# Patient Record
Sex: FEMALE | Race: WHITE | NOT HISPANIC OR LATINO | Employment: FULL TIME | URBAN - METROPOLITAN AREA
[De-identification: names, ages, dates, MRNs, and addresses within clinical notes are randomized per-mention and may not be internally consistent; named-entity substitution may affect disease eponyms.]

---

## 2022-07-24 ENCOUNTER — CLINICAL SUPPORT (OUTPATIENT)
Dept: CARDIOLOGY | Facility: HOSPITAL | Age: 57
DRG: 247 | End: 2022-07-24
Attending: INTERNAL MEDICINE

## 2022-07-24 ENCOUNTER — HOSPITAL ENCOUNTER (EMERGENCY)
Facility: HOSPITAL | Age: 57
Discharge: ANOTHER HOSPITAL | End: 2022-07-24
Attending: FAMILY MEDICINE

## 2022-07-24 ENCOUNTER — HOSPITAL ENCOUNTER (INPATIENT)
Facility: HOSPITAL | Age: 57
LOS: 1 days | Discharge: HOME | DRG: 247 | End: 2022-07-25
Attending: INTERNAL MEDICINE | Admitting: INTERNAL MEDICINE

## 2022-07-24 VITALS
HEART RATE: 95 BPM | TEMPERATURE: 98 F | OXYGEN SATURATION: 99 % | RESPIRATION RATE: 13 BRPM | WEIGHT: 120 LBS | SYSTOLIC BLOOD PRESSURE: 157 MMHG | HEIGHT: 60 IN | BODY MASS INDEX: 23.56 KG/M2 | DIASTOLIC BLOOD PRESSURE: 104 MMHG

## 2022-07-24 VITALS — WEIGHT: 126 LBS | HEIGHT: 60 IN | BODY MASS INDEX: 24.74 KG/M2

## 2022-07-24 DIAGNOSIS — I25.10 CAD (CORONARY ARTERY DISEASE): ICD-10-CM

## 2022-07-24 DIAGNOSIS — R07.9 CHEST PAIN: ICD-10-CM

## 2022-07-24 DIAGNOSIS — I21.3 STEMI (ST ELEVATION MYOCARDIAL INFARCTION): ICD-10-CM

## 2022-07-24 DIAGNOSIS — I21.29 ST ELEVATION MYOCARDIAL INFARCTION (STEMI) INVOLVING OTHER CORONARY ARTERY: Primary | ICD-10-CM

## 2022-07-24 PROBLEM — E11.65 TYPE 2 DIABETES MELLITUS WITH HYPERGLYCEMIA: Status: ACTIVE | Noted: 2022-07-24

## 2022-07-24 PROBLEM — Z72.0 TOBACCO USE: Status: ACTIVE | Noted: 2022-07-24

## 2022-07-24 LAB
ALBUMIN SERPL BCP-MCNC: 3.5 G/DL (ref 3.5–5.2)
ALP SERPL-CCNC: 144 U/L (ref 55–135)
ALT SERPL W/O P-5'-P-CCNC: 43 U/L (ref 10–44)
ANION GAP SERPL CALC-SCNC: 15 MMOL/L (ref 8–16)
AST SERPL-CCNC: 93 U/L (ref 10–40)
AV INDEX (PROSTH): 0.88
AV MEAN GRADIENT: 2 MMHG
AV VALVE AREA: 3.42 CM2
BASOPHILS # BLD AUTO: 0.09 K/UL (ref 0–0.2)
BASOPHILS NFR BLD: 1.1 % (ref 0–1.9)
BILIRUB SERPL-MCNC: 0.3 MG/DL (ref 0.1–1)
BSA FOR ECHO PROCEDURE: 1.56 M2
BUN SERPL-MCNC: 17 MG/DL (ref 6–20)
CALCIUM SERPL-MCNC: 9.5 MG/DL (ref 8.7–10.5)
CHLORIDE SERPL-SCNC: 96 MMOL/L (ref 95–110)
CHOLEST SERPL-MCNC: 229 MG/DL (ref 120–199)
CHOLEST/HDLC SERPL: 4.9 {RATIO} (ref 2–5)
CO2 SERPL-SCNC: 25 MMOL/L (ref 23–29)
CREAT SERPL-MCNC: 0.8 MG/DL (ref 0.5–1.4)
CV ECHO LV RWT: 0.44 CM
DIFFERENTIAL METHOD: ABNORMAL
DOP CALC AO VTI: 17.63 CM
DOP CALC LVOT AREA: 3.9 CM2
DOP CALC LVOT DIAMETER: 2.22 CM
DOP CALC LVOT PEAK VEL: 80.67 M/S
DOP CALC LVOT STROKE VOLUME: 60.24 CM3
DOP CALCLVOT PEAK VEL VTI: 15.57 CM
E WAVE DECELERATION TIME: 209.3 MSEC
E/A RATIO: 0.65
E/E' RATIO: 13.23 M/S
ECHO LV POSTERIOR WALL: 0.8 CM (ref 0.6–1.1)
EJECTION FRACTION: 55 %
EOSINOPHIL # BLD AUTO: 0.2 K/UL (ref 0–0.5)
EOSINOPHIL NFR BLD: 2.4 % (ref 0–8)
ERYTHROCYTE [DISTWIDTH] IN BLOOD BY AUTOMATED COUNT: 12.2 % (ref 11.5–14.5)
EST. GFR  (AFRICAN AMERICAN): >60 ML/MIN/1.73 M^2
EST. GFR  (NON AFRICAN AMERICAN): >60 ML/MIN/1.73 M^2
ESTIMATED AVG GLUCOSE: 372 MG/DL (ref 68–131)
FRACTIONAL SHORTENING: 27 % (ref 28–44)
GLUCOSE SERPL-MCNC: 269 MG/DL (ref 70–110)
GLUCOSE SERPL-MCNC: 277 MG/DL (ref 70–110)
GLUCOSE SERPL-MCNC: 293 MG/DL (ref 70–110)
GLUCOSE SERPL-MCNC: 399 MG/DL (ref 70–110)
HBA1C MFR BLD: 14.6 % (ref 4.5–6.2)
HCT VFR BLD AUTO: 44.3 % (ref 37–48.5)
HDLC SERPL-MCNC: 47 MG/DL (ref 40–75)
HDLC SERPL: 20.5 % (ref 20–50)
HGB BLD-MCNC: 15.2 G/DL (ref 12–16)
IMM GRANULOCYTES # BLD AUTO: 0.06 K/UL (ref 0–0.04)
IMM GRANULOCYTES NFR BLD AUTO: 0.7 % (ref 0–0.5)
INTERVENTRICULAR SEPTUM: 0.82 CM (ref 0.6–1.1)
IVRT: 111.88 MSEC
LDLC SERPL CALC-MCNC: 153.8 MG/DL (ref 63–159)
LEFT INTERNAL DIMENSION IN SYSTOLE: 2.66 CM (ref 2.1–4)
LEFT VENTRICLE DIASTOLIC VOLUME INDEX: 31.41 ML/M2
LEFT VENTRICLE DIASTOLIC VOLUME: 48.06 ML
LEFT VENTRICLE MASS INDEX: 53 G/M2
LEFT VENTRICLE SYSTOLIC VOLUME INDEX: 12.3 ML/M2
LEFT VENTRICLE SYSTOLIC VOLUME: 18.75 ML
LEFT VENTRICULAR INTERNAL DIMENSION IN DIASTOLE: 3.64 CM (ref 3.5–6)
LEFT VENTRICULAR MASS: 81.56 G
LV LATERAL E/E' RATIO: 12.29 M/S
LV SEPTAL E/E' RATIO: 14.33 M/S
LYMPHOCYTES # BLD AUTO: 2.7 K/UL (ref 1–4.8)
LYMPHOCYTES NFR BLD: 32.4 % (ref 18–48)
MCH RBC QN AUTO: 30.1 PG (ref 27–31)
MCHC RBC AUTO-ENTMCNC: 34.3 G/DL (ref 32–36)
MCV RBC AUTO: 88 FL (ref 82–98)
MONOCYTES # BLD AUTO: 0.8 K/UL (ref 0.3–1)
MONOCYTES NFR BLD: 9.8 % (ref 4–15)
MV PEAK A VEL: 1.33 M/S
MV PEAK E VEL: 0.86 M/S
NEUTROPHILS # BLD AUTO: 4.4 K/UL (ref 1.8–7.7)
NEUTROPHILS NFR BLD: 53.6 % (ref 38–73)
NONHDLC SERPL-MCNC: 182 MG/DL
NRBC BLD-RTO: 0 /100 WBC
PISA TR MAX VEL: 2.15 M/S
PLATELET # BLD AUTO: 313 K/UL (ref 150–450)
PMV BLD AUTO: 10.4 FL (ref 9.2–12.9)
POC ACTIVATED CLOTTING TIME K: 202 SEC (ref 74–137)
POC ACTIVATED CLOTTING TIME K: 225 SEC (ref 74–137)
POCT GLUCOSE: 369 MG/DL (ref 70–110)
POTASSIUM SERPL-SCNC: 3.9 MMOL/L (ref 3.5–5.1)
PROT SERPL-MCNC: 7.9 G/DL (ref 6–8.4)
PULM VEIN S/D RATIO: 0.85
PV PEAK D VEL: 51.24 M/S
PV PEAK S VEL: 43.41 M/S
RA PRESSURE: 3 MMHG
RBC # BLD AUTO: 5.05 M/UL (ref 4–5.4)
RIGHT VENTRICULAR END-DIASTOLIC DIMENSION: 2.41 CM
SAMPLE: ABNORMAL
SAMPLE: ABNORMAL
SODIUM SERPL-SCNC: 136 MMOL/L (ref 136–145)
TDI LATERAL: 0.07 M/S
TDI SEPTAL: 0.06 M/S
TDI: 0.07 M/S
TR MAX PG: 18 MMHG
TRIGL SERPL-MCNC: 141 MG/DL (ref 30–150)
TROPONIN I SERPL DL<=0.01 NG/ML-MCNC: 9.42 NG/ML (ref 0–0.03)
TSH SERPL DL<=0.005 MIU/L-ACNC: 1.89 UIU/ML (ref 0.34–5.6)
TV REST PULMONARY ARTERY PRESSURE: 21 MMHG
WBC # BLD AUTO: 8.27 K/UL (ref 3.9–12.7)

## 2022-07-24 PROCEDURE — 93454 CORONARY ARTERY ANGIO S&I: CPT | Mod: XU | Performed by: INTERNAL MEDICINE

## 2022-07-24 PROCEDURE — 63600175 PHARM REV CODE 636 W HCPCS: Performed by: INTERNAL MEDICINE

## 2022-07-24 PROCEDURE — C1894 INTRO/SHEATH, NON-LASER: HCPCS | Performed by: INTERNAL MEDICINE

## 2022-07-24 PROCEDURE — 93306 TTE W/DOPPLER COMPLETE: CPT

## 2022-07-24 PROCEDURE — 93010 ELECTROCARDIOGRAM REPORT: CPT | Mod: ,,, | Performed by: INTERNAL MEDICINE

## 2022-07-24 PROCEDURE — 99285 EMERGENCY DEPT VISIT HI MDM: CPT | Mod: 25

## 2022-07-24 PROCEDURE — 85025 COMPLETE CBC W/AUTO DIFF WBC: CPT | Performed by: FAMILY MEDICINE

## 2022-07-24 PROCEDURE — 20000000 HC ICU ROOM

## 2022-07-24 PROCEDURE — 93010 ELECTROCARDIOGRAM REPORT: CPT | Mod: 76,,, | Performed by: INTERNAL MEDICINE

## 2022-07-24 PROCEDURE — 80053 COMPREHEN METABOLIC PANEL: CPT | Performed by: FAMILY MEDICINE

## 2022-07-24 PROCEDURE — 84484 ASSAY OF TROPONIN QUANT: CPT | Performed by: FAMILY MEDICINE

## 2022-07-24 PROCEDURE — C1887 CATHETER, GUIDING: HCPCS | Performed by: INTERNAL MEDICINE

## 2022-07-24 PROCEDURE — 99900031 HC PATIENT EDUCATION (STAT)

## 2022-07-24 PROCEDURE — 83036 HEMOGLOBIN GLYCOSYLATED A1C: CPT | Performed by: INTERNAL MEDICINE

## 2022-07-24 PROCEDURE — C1725 CATH, TRANSLUMIN NON-LASER: HCPCS | Performed by: INTERNAL MEDICINE

## 2022-07-24 PROCEDURE — C9606 PERC D-E COR REVASC W AMI S: HCPCS | Mod: RI | Performed by: INTERNAL MEDICINE

## 2022-07-24 PROCEDURE — 25000003 PHARM REV CODE 250: Performed by: INTERNAL MEDICINE

## 2022-07-24 PROCEDURE — 93005 ELECTROCARDIOGRAM TRACING: CPT

## 2022-07-24 PROCEDURE — 84443 ASSAY THYROID STIM HORMONE: CPT | Performed by: INTERNAL MEDICINE

## 2022-07-24 PROCEDURE — 25000003 PHARM REV CODE 250

## 2022-07-24 PROCEDURE — 99153 MOD SED SAME PHYS/QHP EA: CPT | Performed by: INTERNAL MEDICINE

## 2022-07-24 PROCEDURE — 93010 EKG 12-LEAD: ICD-10-PCS | Mod: ,,, | Performed by: INTERNAL MEDICINE

## 2022-07-24 PROCEDURE — 80061 LIPID PANEL: CPT | Performed by: INTERNAL MEDICINE

## 2022-07-24 PROCEDURE — 25000003 PHARM REV CODE 250: Performed by: FAMILY MEDICINE

## 2022-07-24 PROCEDURE — 94761 N-INVAS EAR/PLS OXIMETRY MLT: CPT

## 2022-07-24 PROCEDURE — 63600175 PHARM REV CODE 636 W HCPCS: Performed by: FAMILY MEDICINE

## 2022-07-24 PROCEDURE — 93005 ELECTROCARDIOGRAM TRACING: CPT | Performed by: INTERNAL MEDICINE

## 2022-07-24 PROCEDURE — 82962 GLUCOSE BLOOD TEST: CPT

## 2022-07-24 PROCEDURE — 99152 MOD SED SAME PHYS/QHP 5/>YRS: CPT | Performed by: INTERNAL MEDICINE

## 2022-07-24 PROCEDURE — C1874 STENT, COATED/COV W/DEL SYS: HCPCS | Performed by: INTERNAL MEDICINE

## 2022-07-24 PROCEDURE — 93010 EKG 12-LEAD: ICD-10-PCS | Mod: 76,,, | Performed by: INTERNAL MEDICINE

## 2022-07-24 PROCEDURE — 36415 COLL VENOUS BLD VENIPUNCTURE: CPT | Performed by: INTERNAL MEDICINE

## 2022-07-24 PROCEDURE — C1769 GUIDE WIRE: HCPCS | Performed by: INTERNAL MEDICINE

## 2022-07-24 PROCEDURE — 96374 THER/PROPH/DIAG INJ IV PUSH: CPT

## 2022-07-24 PROCEDURE — C9600 PERC DRUG-EL COR STENT SING: HCPCS | Mod: LC | Performed by: INTERNAL MEDICINE

## 2022-07-24 DEVICE — STENT RONYX25018UX RESOLUTE ONYX 2.50X18
Type: IMPLANTABLE DEVICE | Site: CORONARY | Status: FUNCTIONAL
Brand: RESOLUTE ONYX™

## 2022-07-24 DEVICE — STENT RONYX22515UX RESOLUTE ONYX 2.25X15
Type: IMPLANTABLE DEVICE | Site: CORONARY | Status: FUNCTIONAL
Brand: RESOLUTE ONYX™

## 2022-07-24 RX ORDER — FENTANYL CITRATE 50 UG/ML
INJECTION, SOLUTION INTRAMUSCULAR; INTRAVENOUS
Status: DISCONTINUED | OUTPATIENT
Start: 2022-07-24 | End: 2022-07-24 | Stop reason: HOSPADM

## 2022-07-24 RX ORDER — LANOLIN ALCOHOL/MO/W.PET/CERES
800 CREAM (GRAM) TOPICAL
Status: DISCONTINUED | OUTPATIENT
Start: 2022-07-24 | End: 2022-07-25 | Stop reason: HOSPADM

## 2022-07-24 RX ORDER — CLOPIDOGREL BISULFATE 75 MG/1
75 TABLET ORAL DAILY
Status: DISCONTINUED | OUTPATIENT
Start: 2022-07-25 | End: 2022-07-25 | Stop reason: HOSPADM

## 2022-07-24 RX ORDER — ACETAMINOPHEN 325 MG/1
650 TABLET ORAL EVERY 8 HOURS PRN
Status: DISCONTINUED | OUTPATIENT
Start: 2022-07-24 | End: 2022-07-25 | Stop reason: HOSPADM

## 2022-07-24 RX ORDER — INSULIN ASPART 100 [IU]/ML
1-12 INJECTION, SOLUTION INTRAVENOUS; SUBCUTANEOUS
Status: DISCONTINUED | OUTPATIENT
Start: 2022-07-24 | End: 2022-07-25 | Stop reason: HOSPADM

## 2022-07-24 RX ORDER — ENOXAPARIN SODIUM 100 MG/ML
40 INJECTION SUBCUTANEOUS EVERY 24 HOURS
Status: DISCONTINUED | OUTPATIENT
Start: 2022-07-24 | End: 2022-07-25 | Stop reason: HOSPADM

## 2022-07-24 RX ORDER — ASPIRIN 81 MG/1
81 TABLET ORAL DAILY
Status: DISCONTINUED | OUTPATIENT
Start: 2022-07-24 | End: 2022-07-25 | Stop reason: HOSPADM

## 2022-07-24 RX ORDER — SODIUM,POTASSIUM PHOSPHATES 280-250MG
2 POWDER IN PACKET (EA) ORAL
Status: DISCONTINUED | OUTPATIENT
Start: 2022-07-24 | End: 2022-07-25 | Stop reason: HOSPADM

## 2022-07-24 RX ORDER — VERAPAMIL HYDROCHLORIDE 2.5 MG/ML
INJECTION, SOLUTION INTRAVENOUS
Status: DISCONTINUED | OUTPATIENT
Start: 2022-07-24 | End: 2022-07-24 | Stop reason: HOSPADM

## 2022-07-24 RX ORDER — METFORMIN HYDROCHLORIDE 1000 MG/1
1000 TABLET ORAL 2 TIMES DAILY WITH MEALS
COMMUNITY
End: 2023-11-30

## 2022-07-24 RX ORDER — HEPARIN SODIUM 5000 [USP'U]/ML
4000 INJECTION, SOLUTION INTRAVENOUS; SUBCUTANEOUS
Status: COMPLETED | OUTPATIENT
Start: 2022-07-24 | End: 2022-07-24

## 2022-07-24 RX ORDER — LIDOCAINE HYDROCHLORIDE 10 MG/ML
INJECTION, SOLUTION EPIDURAL; INFILTRATION; INTRACAUDAL; PERINEURAL
Status: DISCONTINUED | OUTPATIENT
Start: 2022-07-24 | End: 2022-07-24 | Stop reason: HOSPADM

## 2022-07-24 RX ORDER — CLOPIDOGREL BISULFATE 75 MG/1
TABLET ORAL
Status: DISCONTINUED | OUTPATIENT
Start: 2022-07-24 | End: 2022-07-24 | Stop reason: HOSPADM

## 2022-07-24 RX ORDER — ONDANSETRON 2 MG/ML
4 INJECTION INTRAMUSCULAR; INTRAVENOUS EVERY 8 HOURS PRN
Status: DISCONTINUED | OUTPATIENT
Start: 2022-07-24 | End: 2022-07-25 | Stop reason: HOSPADM

## 2022-07-24 RX ORDER — HEPARIN SODIUM 1000 [USP'U]/ML
INJECTION, SOLUTION INTRAVENOUS; SUBCUTANEOUS
Status: DISCONTINUED | OUTPATIENT
Start: 2022-07-24 | End: 2022-07-24 | Stop reason: HOSPADM

## 2022-07-24 RX ORDER — MIDAZOLAM HYDROCHLORIDE 1 MG/ML
INJECTION, SOLUTION INTRAMUSCULAR; INTRAVENOUS
Status: DISCONTINUED | OUTPATIENT
Start: 2022-07-24 | End: 2022-07-24 | Stop reason: HOSPADM

## 2022-07-24 RX ORDER — ASPIRIN 325 MG
325 TABLET ORAL
Status: COMPLETED | OUTPATIENT
Start: 2022-07-24 | End: 2022-07-24

## 2022-07-24 RX ORDER — POLYETHYLENE GLYCOL 3350 17 G/17G
17 POWDER, FOR SOLUTION ORAL DAILY PRN
Status: DISCONTINUED | OUTPATIENT
Start: 2022-07-24 | End: 2022-07-25 | Stop reason: HOSPADM

## 2022-07-24 RX ORDER — ATORVASTATIN CALCIUM 40 MG/1
40 TABLET, FILM COATED ORAL NIGHTLY
Status: DISCONTINUED | OUTPATIENT
Start: 2022-07-24 | End: 2022-07-25 | Stop reason: HOSPADM

## 2022-07-24 RX ORDER — NALOXONE HCL 0.4 MG/ML
0.02 VIAL (ML) INJECTION
Status: DISCONTINUED | OUTPATIENT
Start: 2022-07-24 | End: 2022-07-25 | Stop reason: HOSPADM

## 2022-07-24 RX ORDER — TALC
6 POWDER (GRAM) TOPICAL NIGHTLY PRN
Status: DISCONTINUED | OUTPATIENT
Start: 2022-07-24 | End: 2022-07-25 | Stop reason: HOSPADM

## 2022-07-24 RX ORDER — METOPROLOL TARTRATE 25 MG/1
12.5 TABLET ORAL 2 TIMES DAILY
Status: DISCONTINUED | OUTPATIENT
Start: 2022-07-24 | End: 2022-07-25 | Stop reason: HOSPADM

## 2022-07-24 RX ORDER — ASPIRIN 81 MG/1
81 TABLET ORAL DAILY
COMMUNITY

## 2022-07-24 RX ADMIN — INSULIN ASPART 6 UNITS: 100 INJECTION, SOLUTION INTRAVENOUS; SUBCUTANEOUS at 01:07

## 2022-07-24 RX ADMIN — METOPROLOL TARTRATE 12.5 MG: 25 TABLET, FILM COATED ORAL at 09:07

## 2022-07-24 RX ADMIN — HEPARIN SODIUM 4000 UNITS: 5000 INJECTION, SOLUTION INTRAVENOUS; SUBCUTANEOUS at 03:07

## 2022-07-24 RX ADMIN — ASPIRIN 325 MG ORAL TABLET 325 MG: 325 PILL ORAL at 03:07

## 2022-07-24 RX ADMIN — ATORVASTATIN CALCIUM 40 MG: 40 TABLET, FILM COATED ORAL at 09:07

## 2022-07-24 RX ADMIN — INSULIN ASPART 6 UNITS: 100 INJECTION, SOLUTION INTRAVENOUS; SUBCUTANEOUS at 05:07

## 2022-07-24 RX ADMIN — INSULIN ASPART 6 UNITS: 100 INJECTION, SOLUTION INTRAVENOUS; SUBCUTANEOUS at 09:07

## 2022-07-24 RX ADMIN — Medication 6 MG: at 10:07

## 2022-07-24 RX ADMIN — SODIUM CHLORIDE 500 ML: 0.9 INJECTION, SOLUTION INTRAVENOUS at 03:07

## 2022-07-24 RX ADMIN — ENOXAPARIN SODIUM 40 MG: 40 INJECTION SUBCUTANEOUS at 05:07

## 2022-07-24 NOTE — ED PROVIDER NOTES
Encounter Date: 7/24/2022       History   No chief complaint on file.    57-year-old female presents the ED complaining of dull substernal nonradiating chest pain onset was 1900 on 07/23, the patient has not had similar symptoms in the past she is a cigarette smoker and has a history of diabetes no known coronary artery disease        Review of patient's allergies indicates:   Allergen Reactions    Antihistamines - alkylamine     Sulfa (sulfonamide antibiotics)      Facial swelling     Past Medical History:   Diagnosis Date    Diabetes mellitus      No past surgical history on file.  No family history on file.  Social History     Tobacco Use    Smoking status: Current Every Day Smoker     Packs/day: 0.50    Smokeless tobacco: Never Used   Substance Use Topics    Alcohol use: Never    Drug use: Never     Review of Systems   Constitutional: Negative for fever.   HENT: Negative for sore throat.    Respiratory: Negative for shortness of breath.    Cardiovascular: Positive for chest pain. Negative for palpitations.   Gastrointestinal: Negative for nausea.   Genitourinary: Negative for dysuria.   Musculoskeletal: Negative for back pain.   Skin: Negative for rash.   Neurological: Negative for weakness.   Hematological: Does not bruise/bleed easily.       Physical Exam     Initial Vitals   BP Pulse Resp Temp SpO2   -- -- -- -- --      MAP       --         Physical Exam    Nursing note and vitals reviewed.  Constitutional: She appears well-developed and well-nourished. She is not diaphoretic. No distress.   HENT:   Head: Normocephalic and atraumatic.   Eyes: Pupils are equal, round, and reactive to light. Right eye exhibits no discharge. Left eye exhibits no discharge.   Neck: No tracheal deviation present. No JVD present.   Cardiovascular: Exam reveals no friction rub.    No murmur heard.  Pulmonary/Chest: No stridor. No respiratory distress. She has no wheezes. She has no rales.   Abdominal: Bowel sounds are normal.  She exhibits no distension.   Musculoskeletal:         General: Normal range of motion.     Neurological: She is alert.   Skin: Skin is warm.   Psychiatric: She has a normal mood and affect.     The evaluation, management and treatment of this patient involved Critical Care services  amounting to 45 minutes of direct involvement.  This time was exclusive of any billable procedures.  ED Course   Procedures  Labs Reviewed - No data to display  EKG Readings: (Independently Interpreted)   Initial Reading: STEMI. Previous EKG Date: none available. Rhythm: Normal Sinus Rhythm. Heart Rate: 99. Ectopy: No Ectopy. Conduction: Normal. ST Segment Elevation: AVL, I, V5 and V6. ST Segment Depression: III, V1 and V2.       Imaging Results    None          Medications - No data to display  Medical Decision Making:   ED Management:  Case discussed with regional referral center and cardiologist Dr. Johnston and ED physician Dr. Kofi Almonte at [facility] ED patient will be transferred promptly                      Clinical Impression:   Final diagnoses:  [I21.29] ST elevation myocardial infarction (STEMI) involving other coronary artery (Primary)                 Ash Pino MD  07/24/22 3917

## 2022-07-24 NOTE — Clinical Note
The catheter was inserted into the left coronary artery  ostium   right coronary artery. An angiography was performed of the right coronary arteries. Multiple views were taken. The angiography was performed via power injection. The injected amount was 6 mL contrast at 3 mL/s.

## 2022-07-24 NOTE — Clinical Note
The radial band was applied to the right radial artery. 16 cc's of air were inserted into the closure device.

## 2022-07-24 NOTE — PROGRESS NOTES
[facility] Medicine  Progress Note    Patient name: No Munoz  MRN: [id number]  Admit Date: 7/24/2022   LOS: 0 days     SUBJECTIVE:     Principal problem: STEMI (ST elevation myocardial infarction)    Interval History:  Patient denies chest pain a time of my evaluation.    Scheduled Meds:   aspirin  81 mg Oral Daily    atorvastatin  40 mg Oral QHS    [START ON 7/25/2022] clopidogreL  75 mg Oral Daily    enoxaparin  40 mg Subcutaneous Daily    metoprolol tartrate  12.5 mg Oral BID     Continuous Infusions:  PRN Meds:acetaminophen, dextrose 50%, dextrose 50%, insulin aspart U-100, magnesium oxide, magnesium oxide, melatonin, naloxone, ondansetron, polyethylene glycol, potassium bicarbonate, potassium bicarbonate, potassium bicarbonate, potassium, sodium phosphates, potassium, sodium phosphates, potassium, sodium phosphates    Review of patient's allergies indicates:   Allergen Reactions    Antihistamines - alkylamine     Sulfa (sulfonamide antibiotics)      Facial swelling       Review of Systems: As per interval history    OBJECTIVE:     Vital Signs (Most Recent)  Temp: 98.1 °F (36.7 °C) (07/24/22 1330)  Pulse: 94 (07/24/22 1330)  Resp: 15 (07/24/22 1330)  BP: 119/70 (07/24/22 1245)  SpO2: 98 % (07/24/22 1330)    Vital Signs Range (Last 24H):  Temp:  [98.1 °F (36.7 °C)-98.2 °F (36.8 °C)]   Pulse:  []   Resp:  [12-29]   BP: ()/()   SpO2:  [96 %-100 %]     I & O (Last 24H):    Intake/Output Summary (Last 24 hours) at 7/24/2022 1702  Last data filed at 7/24/2022 1301  Gross per 24 hour   Intake 400 ml   Output 500 ml   Net -100 ml       Physical Exam:  General: Patient resting comfortably in no acute distress. Appears stated age  Head: Normocephalic and atraumatic   Eyes:  No conjunctival pallor. No scleral icterus.  Mouth: Oral mucosa moist   Lungs: CTA. Good air entry.  Cor: Regular rate and rhythm. No murmurs. No pedal edema.  Abd: Soft.  Nontender  Musculoskeletal: No arthropathy, deformity.  Skin: No rashes, swelling, or erythema.  Neuro: Alert. Moving all extremities equally. Follows commands  Ext: No clubbing. No cyanosis. Peripheral pulses +  Psych: Normal mood and affect. Memory intact       Laboratory:  I have reviewed all pertinent lab results within the past 24 hours.  CBC:   Recent Labs   Lab 07/24/22 0342   WBC 8.27   RBC 5.05   HGB 15.2   HCT 44.3      MCV 88   MCH 30.1   MCHC 34.3     BMP:   Recent Labs   Lab 07/24/22 0342   *      K 3.9   CL 96   CO2 25   BUN 17   CREATININE 0.8   CALCIUM 9.5     Cardiac markers:   Recent Labs   Lab 07/24/22 0342   TROPONINI 9.421*     No results for input(s): COLORU, CLARITYU, SPECGRAV, PHUR, PROTEINUA, GLUCOSEU, BILIRUBINCON, BLOODU, WBCU, RBCU, BACTERIA, MUCUS, NITRITE, LEUKOCYTESUR, UROBILINOGEN, HYALINECASTS in the last 168 hours.    Diagnostic Results:  Labs: Reviewed  ECG: Reviewed  X-Ray: Reviewed    ASSESSMENT/PLAN:       Active Hospital Problems    Diagnosis  POA    *STEMI (ST elevation myocardial infarction) [I21.3]  Yes    Type 2 diabetes mellitus with hyperglycemia [E11.65]  Yes    Tobacco use [Z72.0]  Yes      Resolved Hospital Problems   No resolved problems to display.         Plan:  ST-elevation myocardial infarction with lateral injury pattern  Culprit vessel is likely the ramus intermedius.  Severe 99% lesion in the left circumflex.  Underwent intervention with stenting of both these vessels  Also noted to have nonobstructive CAD in the proximal to mid RCA and ostial to proximal LAD lesions  Routine post catheterization care  Dual antiplatelet therapy with aspirin and clopidogrel  Start atorvastatin 40 mg q.h.s.  Check lipid panel (high colesterol) and hemoglobin A1c (14.6)  Tobacco cessation  Cardiac rehab at discharge   metoprolol tartrate 12.5 mg bid  Echocardiogram  Moderate dose insulin sliding scale for type 2 DM.  Hold home metformin     Continue to  follow with Cardiology.  Input appreciated.            VTE Risk Mitigation (From admission, onward)         Ordered     enoxaparin injection 40 mg  Daily         07/24/22 0633     IP VTE HIGH RISK PATIENT  Once         07/24/22 0633     Place sequential compression device  Until discontinued         07/24/22 0633                    Department Hospital Medicine  [facility]  Dean Sears MD  Date of service: 07/24/2022

## 2022-07-24 NOTE — H&P
[facility] Medicine  History & Physical    Patient Name: No Munoz  MRN: [id number]  Patient Class: IP- Inpatient  Admission Date: 7/24/2022  Attending Physician: Srini Ndiaye MD  Primary Care Provider: Primary Doctor No         Patient information was obtained from patient, past medical records and ER records.     Subjective:     Principal Problem:STEMI (ST elevation myocardial infarction)    Chief Complaint:   Chief Complaint   Patient presents with    stemi        HPI: Patient is a 57-year-old  female with tobacco use and type 2 DM who presented to outside ED with left arm and chest discomfort as well as substernal dull nonradiating chest pain.      Symptoms started acutely last night.  Chest pain is substernal in location with radiation to the left arm. It is described as dull in nature.  No exacerbating or relieving factors.  Denies similar symptoms in the past.  EKG revealed ST elevation in lateral leads with reciprocal ST depression.  She was transferred to our facility for emergent cardiac catheterization.    Patient was seen in the ED.  Chest pain has subsided.  Describes ongoing left arm discomfort.  She was taken to the cath lab emergently.  Discussed plan with cardiologist.  Successful revascularization of ramus and left circumflex.          Past Medical History:   Diagnosis Date    Diabetes mellitus        No past surgical history on file.    Review of patient's allergies indicates:   Allergen Reactions    Antihistamines - alkylamine     Sulfa (sulfonamide antibiotics)      Facial swelling       Current Facility-Administered Medications on File Prior to Encounter   Medication    [COMPLETED] aspirin tablet 325 mg    [COMPLETED] heparin (porcine) injection 4,000 Units    [COMPLETED] sodium chloride 0.9% bolus 500 mL     Current Outpatient Medications on File Prior to Encounter   Medication Sig    aspirin (ECOTRIN) 81 MG EC tablet Take 81 mg by mouth  once daily.    metFORMIN (GLUCOPHAGE) 1000 MG tablet Take 1,000 mg by mouth 2 (two) times daily with meals.     Family History    None       Tobacco Use    Smoking status: Current Every Day Smoker     Packs/day: 0.50    Smokeless tobacco: Never Used   Substance and Sexual Activity    Alcohol use: Never    Drug use: Never    Sexual activity: Not on file     Review of Systems   Unable to perform ROS: Acuity of condition  STEMI  Objective:     Vital Signs (Most Recent):  Temp: 98.2 °F (36.8 °C) (07/24/22 0503)  Pulse: 100 (07/24/22 0503)  Resp: 20 (07/24/22 0503)  BP: (!) 145/81 (07/24/22 0503)  SpO2: 96 % (07/24/22 0503)   Vital Signs (24h Range):  Temp:  [98.1 °F (36.7 °C)-98.2 °F (36.8 °C)] 98.2 °F (36.8 °C)  Pulse:  [] 100  Resp:  [13-21] 20  SpO2:  [96 %-100 %] 96 %  BP: (145-161)/() 145/81        There is no height or weight on file to calculate BMI.    Physical Exam      General: Patient resting comfortably in no acute distress. Appears stated age  Head: Normocephalic and atraumatic   Eyes:  No conjunctival pallor. No scleral icterus.  Mouth: Oral mucosa moist   Lungs: CTA. Good air entry.  Cor: Regular rate and rhythm. No murmurs. No pedal edema.  Abd: Soft. Nontender  Musculoskeletal: No arthropathy, deformity.  Skin: No rashes, swelling, or erythema.  Neuro: Alert. Moving all extremities equally. Follows commands  Ext: No clubbing. No cyanosis. Peripheral pulses +  Psych: Normal mood and affect. Memory intact       Significant Labs: All pertinent labs within the past 24 hours have been reviewed.  CBC:   Recent Labs   Lab 07/24/22  0342   WBC 8.27   HGB 15.2   HCT 44.3        CMP:   Recent Labs   Lab 07/24/22  0342      K 3.9   CL 96   CO2 25   *   BUN 17   CREATININE 0.8   CALCIUM 9.5   PROT 7.9   ALBUMIN 3.5   BILITOT 0.3   ALKPHOS 144*   AST 93*   ALT 43   ANIONGAP 15   EGFRNONAA >60.0     Troponin:   Recent Labs   Lab 07/24/22  0342   TROPONINI 9.421*       Significant  Imaging: I have reviewed all pertinent imaging results/findings within the past 24 hours.    Imaging Results              X-Ray Chest AP Portable (Final result)  Result time 07/24/22 06:32:48      Final result by Stalin Townsend MD (07/24/22 06:32:48)                   Narrative:    Chest single view    Clinical data:Chest pain.    FINDINGS: AP view of the chest demonstrates no cardiac, pulmonary, or osseous abnormalities.    IMPRESSION:  1. Normal chest single view.    Electronically signed by:  Stalin Townsend MD  7/24/2022 6:32 AM CDT Workstation: [id number]                                  Results for orders placed or performed during the hospital encounter of 07/24/22   EKG 12-lead    Collection Time: 07/24/22  4:58 AM    Narrative    Test Reason : I21.3,    Vent. Rate : 097 BPM     Atrial Rate : 097 BPM     P-R Int : 164 ms          QRS Dur : 076 ms      QT Int : 366 ms       P-R-T Axes : 069 080 083 degrees     QTc Int : 464 ms    Normal sinus rhythm  Nonspecific ST and T wave abnormality  Abnormal ECG  When compared with ECG of 24-JUL-2022 03:24,  ST no longer elevated in Lateral leads    Referred By: CAMELIA BURNETT           Confirmed By:          Assessment/Plan:     Active Hospital Problems    Diagnosis  POA    *STEMI (ST elevation myocardial infarction) [I21.3]  Yes    Type 2 diabetes mellitus with hyperglycemia [E11.65]  Yes    Tobacco use [Z72.0]  Yes      Resolved Hospital Problems   No resolved problems to display.       Plan:  ST-elevation myocardial infarction with lateral injury pattern  Culprit vessel is likely the ramus intermedius.  Severe 99% lesion in the left circumflex.  Underwent intervention with stenting of both these vessels  Also noted to have nonobstructive CAD in the proximal to mid RCA and ostial to proximal LAD lesions  Routine post catheterization care  Dual antiplatelet therapy with aspirin and clopidogrel  Start atorvastatin 40 mg q.h.s.  Check lipid panel and  hemoglobin A1c  Tobacco cessation  Cardiac rehab at discharge   Start metoprolol tartrate 12.5 mg tonight  Echocardiogram  Moderate dose insulin sliding scale for type 2 DM.  Hold home metformin    Discussed plan with Cardiology  Critical care time of 40 minutes spent      VTE Risk Mitigation (From admission, onward)         Ordered     enoxaparin injection 40 mg  Daily         07/24/22 0633     IP VTE HIGH RISK PATIENT  Once         07/24/22 0633     Place sequential compression device  Until discontinued         07/24/22 0633     heparin (porcine) injection  As needed (PRN)         07/24/22 0538                   Srini Ndiaye MD  Department of Hospital Medicine   Formerly [facility], [facility]

## 2022-07-24 NOTE — SUBJECTIVE & OBJECTIVE
Past Medical History:   Diagnosis Date    Diabetes mellitus        No past surgical history on file.    Review of patient's allergies indicates:   Allergen Reactions    Antihistamines - alkylamine     Sulfa (sulfonamide antibiotics)      Facial swelling       Current Facility-Administered Medications on File Prior to Encounter   Medication    [COMPLETED] aspirin tablet 325 mg    [COMPLETED] heparin (porcine) injection 4,000 Units    [COMPLETED] sodium chloride 0.9% bolus 500 mL     Current Outpatient Medications on File Prior to Encounter   Medication Sig    aspirin (ECOTRIN) 81 MG EC tablet Take 81 mg by mouth once daily.    metFORMIN (GLUCOPHAGE) 1000 MG tablet Take 1,000 mg by mouth 2 (two) times daily with meals.     Family History    None       Tobacco Use    Smoking status: Current Every Day Smoker     Packs/day: 0.50    Smokeless tobacco: Never Used   Substance and Sexual Activity    Alcohol use: Never    Drug use: Never    Sexual activity: Not on file     Review of Systems   Unable to perform ROS: Acuity of condition  STEMI  Objective:     Vital Signs (Most Recent):  Temp: 98.2 °F (36.8 °C) (07/24/22 0503)  Pulse: 100 (07/24/22 0503)  Resp: 20 (07/24/22 0503)  BP: (!) 145/81 (07/24/22 0503)  SpO2: 96 % (07/24/22 0503)   Vital Signs (24h Range):  Temp:  [98.1 °F (36.7 °C)-98.2 °F (36.8 °C)] 98.2 °F (36.8 °C)  Pulse:  [] 100  Resp:  [13-21] 20  SpO2:  [96 %-100 %] 96 %  BP: (145-161)/() 145/81        There is no height or weight on file to calculate BMI.    Physical Exam      General: Patient resting comfortably in no acute distress. Appears stated age  Head: Normocephalic and atraumatic   Eyes:  No conjunctival pallor. No scleral icterus.  Mouth: Oral mucosa moist   Lungs: CTA. Good air entry.  Cor: Regular rate and rhythm. No murmurs. No pedal edema.  Abd: Soft. Nontender  Musculoskeletal: No arthropathy, deformity.  Skin: No rashes, swelling, or erythema.  Neuro: Alert. Moving all extremities  equally. Follows commands  Ext: No clubbing. No cyanosis. Peripheral pulses +  Psych: Normal mood and affect. Memory intact       Significant Labs: All pertinent labs within the past 24 hours have been reviewed.  CBC:   Recent Labs   Lab 07/24/22 0342   WBC 8.27   HGB 15.2   HCT 44.3        CMP:   Recent Labs   Lab 07/24/22 0342      K 3.9   CL 96   CO2 25   *   BUN 17   CREATININE 0.8   CALCIUM 9.5   PROT 7.9   ALBUMIN 3.5   BILITOT 0.3   ALKPHOS 144*   AST 93*   ALT 43   ANIONGAP 15   EGFRNONAA >60.0     Troponin:   Recent Labs   Lab 07/24/22 0342   TROPONINI 9.421*       Significant Imaging: I have reviewed all pertinent imaging results/findings within the past 24 hours.    Imaging Results              X-Ray Chest AP Portable (Final result)  Result time 07/24/22 06:32:48      Final result by Stalin Townsend MD (07/24/22 06:32:48)                   Narrative:    Chest single view    Clinical data:Chest pain.    FINDINGS: AP view of the chest demonstrates no cardiac, pulmonary, or osseous abnormalities.    IMPRESSION:  1. Normal chest single view.    Electronically signed by:  Stalin Townsend MD  7/24/2022 6:32 AM CDT Workstation: [id number]                                  Results for orders placed or performed during the hospital encounter of 07/24/22   EKG 12-lead    Collection Time: 07/24/22  4:58 AM    Narrative    Test Reason : I21.3,    Vent. Rate : 097 BPM     Atrial Rate : 097 BPM     P-R Int : 164 ms          QRS Dur : 076 ms      QT Int : 366 ms       P-R-T Axes : 069 080 083 degrees     QTc Int : 464 ms    Normal sinus rhythm  Nonspecific ST and T wave abnormality  Abnormal ECG  When compared with ECG of 24-JUL-2022 03:24,  ST no longer elevated in Lateral leads    Referred By: CAMELIA BURNETT           Confirmed By:

## 2022-07-24 NOTE — Clinical Note
The groin and right radial was prepped. The site was prepped with ChloraPrep. The site was clipped. The patient was draped. The patient was positioned supine. The patient was secured using safety straps and to an armboard.

## 2022-07-24 NOTE — Clinical Note
The catheter was repositioned into the ostial  left coronary artery. An angiography was performed of the left coronary arteries. Multiple views were taken. The angiography was performed via power injection. The injected amount was 8 mL contrast at 4 mL/s.

## 2022-07-24 NOTE — HPI
Patient is a 57-year-old  female with tobacco use and type 2 DM who presented to outside ED with left arm and chest discomfort as well as substernal dull nonradiating chest pain.      Symptoms started acutely last night.  Chest pain is substernal in location with radiation to the left arm. It is described as dull in nature.  No exacerbating or relieving factors.  Denies similar symptoms in the past.  EKG revealed ST elevation in lateral leads with reciprocal ST depression.  She was transferred to our facility for emergent cardiac catheterization.    Patient was seen in the ED.  Chest pain has subsided.  Describes ongoing left arm discomfort.  She was taken to the cath lab emergently.  Discussed plan with cardiologist.  Successful revascularization of ramus and left circumflex.

## 2022-07-24 NOTE — CONSULTS
Critical access hospital  Department of Cardiology  Consult Note      PATIENT NAME: No Munoz  MRN: [id number]  TODAY'S DATE: 07/24/2022  ADMIT DATE: 7/24/2022                          CONSULT REQUESTED BY: Dean Sears MD      REASON FOR CONSULT:  STEMI (ST elevation myocardial infarction)      HPI:  No Munoz is a 57 y.o. who is admitted due to STEMI (ST elevation myocardial infarction).   H/o smoking and T2DM (uncontrolled) that presented last night to [facility] after waking up with chest tightness- left arm pain. EKG SR, lateral STEMI with T wave inversions in septal leads, Found RI heazy lesion and LCX 95% stenosis , both stented. Additional CAD in the proximal LAD and RCA.        Review of patient's allergies indicates:   Allergen Reactions    Antihistamines - alkylamine     Sulfa (sulfonamide antibiotics)      Facial swelling       Past Medical History:   Diagnosis Date    Diabetes mellitus      History reviewed. No pertinent surgical history.  Social History     Tobacco Use    Smoking status: Current Every Day Smoker     Packs/day: 1.00    Smokeless tobacco: Never Used   Substance Use Topics    Alcohol use: Never    Drug use: Never        REVIEW OF SYSTEMS  CONSTITUTIONAL: Negative for chills, fatigue and fever.   EYES: No double vision, No blurred vision  NEURO: No headaches, No dizziness  RESPIRATORY: Negative for cough, shortness of breath and wheezing.    CARDIOVASCULAR: + chest pain and arm pain   GI: Negative for abdominal pain, No melena, diarrhea, nausea and vomiting.   : Negative for dysuria and frequency, Negative for hematuria  SKIN: Negative for bruising, Negative for edema or discoloration noted.   ENDOCRINE: Negative for polyphagia, Negative for heat intolerance, Negative for cold intolerance  PSYCHIATRIC: Negative for depression, Negative for anxiety, Negative for memory loss  MUSCULOSKELETAL: Negative for neck pain, Negative for muscle weakness, Negative  for back pain     PHYSICAL EXAM      VITAL SIGNS (Most Recent)  Temp: 98.1 °F (36.7 °C) (07/24/22 0725)  Pulse: 87 (07/24/22 0800)  Resp: 19 (07/24/22 0800)  BP: 105/61 (07/24/22 0800)  SpO2: 97 % (07/24/22 0800)    VENTILATION STATUS  Resp: 19 (07/24/22 0800)  SpO2: 97 % (07/24/22 0800)       I & O (Last 24H):No intake or output data in the 24 hours ending 07/24/22 0919    WEIGHTS  Wt Readings from Last 3 Encounters:   07/24/22 0852 57.5 kg (126 lb 12.2 oz)   07/24/22 0324 54.4 kg (120 lb)         GENERAL: well built, well nourished, well-developed in no apparent distress alert and oriented.   HEENT: Thais bleeding    NECK: No JVD. No bruit..   THYROID: Thyroid not enlarged. No nodules present..   CARDIAC: Regular rate and rhythm. S1 is normal.S2 is normal.No gallops, clicks or murmurs noted at this time.  CHEST ANATOMY: normal.   LUNGS: Clear to auscultation. No wheezing or rhonchi..   ABDOMEN: Soft no masses or organomegaly.  No abdomen pulsations or bruits.  Normal bowel sounds. No pulsations and no masses felt, No guarding or rebound.   URINARY: No leung catheter   EXTREMITIES: No cyanosis, clubbing or edema noted at this time., no calf tenderness bilaterally.   PERIPHERAL VASCULAR SYSTEM: Good palpable distal pulses.   CENTRAL NERVOUS SYSTEM: No focal motor or sensory deficits noted.   SKIN: Skin without lesions, moist, well perfused.   MUSCLE STRENGTH & TONE: No noteable weakness, atrophy or abnormal movement.     HOME MEDICATIONS:  Current Facility-Administered Medications on File Prior to Encounter   Medication Dose Route Frequency Provider Last Rate Last Admin    [COMPLETED] aspirin tablet 325 mg  325 mg Oral ED 1 Time Ash Pino MD   325 mg at 07/24/22 0336    [COMPLETED] heparin (porcine) injection 4,000 Units  4,000 Units Intravenous ED 1 Time Ash Pino MD   4,000 Units at 07/24/22 4114    [COMPLETED] sodium chloride 0.9% bolus 500 mL  500 mL Intravenous ED 1 Time Ash KIMBROUGH  MD Odette 500 mL/hr at 07/24/22 0342 500 mL at 07/24/22 0342     Current Outpatient Medications on File Prior to Encounter   Medication Sig Dispense Refill    aspirin (ECOTRIN) 81 MG EC tablet Take 81 mg by mouth once daily.      metFORMIN (GLUCOPHAGE) 1000 MG tablet Take 1,000 mg by mouth 2 (two) times daily with meals.         SCHEDULED MEDS:   aspirin  81 mg Oral Daily    atorvastatin  40 mg Oral QHS    [START ON 7/25/2022] clopidogreL  75 mg Oral Daily    enoxaparin  40 mg Subcutaneous Daily    metoprolol tartrate  12.5 mg Oral BID       CONTINUOUS INFUSIONS:    PRN MEDS:acetaminophen, dextrose 50%, dextrose 50%, insulin aspart U-100, magnesium oxide, magnesium oxide, melatonin, naloxone, ondansetron, polyethylene glycol, potassium bicarbonate, potassium bicarbonate, potassium bicarbonate, potassium, sodium phosphates, potassium, sodium phosphates, potassium, sodium phosphates    LABS AND DIAGNOSTICS     CBC LAST 3 DAYS  Recent Labs   Lab 07/24/22 0342   WBC 8.27   RBC 5.05   HGB 15.2   HCT 44.3   MCV 88   MCH 30.1   MCHC 34.3   RDW 12.2      MPV 10.4   GRAN 53.6  4.4   LYMPH 32.4  2.7   MONO 9.8  0.8   BASO 0.09   NRBC 0       COAGULATION LAST 3 DAYS  No results for input(s): LABPT, INR, APTT in the last 168 hours.    CHEMISTRY LAST 3 DAYS  Recent Labs   Lab 07/24/22 0342      K 3.9   CL 96   CO2 25   ANIONGAP 15   BUN 17   CREATININE 0.8   *   CALCIUM 9.5   ALBUMIN 3.5   PROT 7.9   ALKPHOS 144*   ALT 43   AST 93*   BILITOT 0.3       CARDIAC PROFILE LAST 3 DAYS  Recent Labs   Lab 07/24/22 0342   TROPONINI 9.421*       ENDOCRINE LAST 3 DAYS  No results for input(s): TSH, PROCAL in the last 168 hours.    LAST ARTERIAL BLOOD GAS  ABG  No results for input(s): PH, PO2, PCO2, HCO3, BE in the last 168 hours.    LAST 7 DAYS MICROBIOLOGY   Microbiology Results (last 7 days)     ** No results found for the last 168 hours. **          MOST RECENT IMAGING  Cardiac catheterization  ·  Culprit lesion is 99% reamus intermedius s/p succesful intervention PTCA   2.0 mm balloon and stent 2.25 x 15 mm  · A second severe lesion in the Prox Cx to Mid Cx lesion was 95% stenosed   with 0% stenosis post-intervention and 2.5 x 18 mm YEVGENIY was placed.  · There was two significant vessel coronary artery disease and   non-obstructuve CAD in other arteries.  · The Prox RCA to Mid RCA lesion was 50% stenosed.  · The Ost LAD to Prox LAD lesion was 50% stenosed.  · The estimated blood loss was none.     The procedure log was documented by Documenter: RT Mireille and   verified by Rohith Guajardo MD.    Date: 7/24/2022  Time: 6:46 AM  X-Ray Chest AP Portable  Chest single view    Clinical data:Chest pain.    FINDINGS: AP view of the chest demonstrates no cardiac, pulmonary, or osseous abnormalities.    IMPRESSION:  1. Normal chest single view.    Electronically signed by:  Stalin Townsend MD  7/24/2022 6:32 AM PagerDutyT Workstation: [id number]      ECHOCARDIOGRAM RESULTS (last 5)  No results found for this or any previous visit.      CARDIAC CATH (last)  No results found for this or any previous visit.      CURRENT/PREVIOUS VISIT EKG  Results for orders placed or performed during the hospital encounter of 07/24/22   EKG 12-LEAD on arrival to floor    Collection Time: 07/24/22  6:52 AM    Narrative    Test Reason : I25.10,    Vent. Rate : 087 BPM     Atrial Rate : 087 BPM     P-R Int : 166 ms          QRS Dur : 084 ms      QT Int : 402 ms       P-R-T Axes : 072 085 093 degrees     QTc Int : 483 ms    Normal sinus rhythm  Prolonged QT  Abnormal ECG  When compared with ECG of 24-JUL-2022 04:58,  No significant change was found    Referred By: CAMELIA BURNETT           Confirmed By:          ASSESSMENT/PLAN:     Active Hospital Problems    Diagnosis    *STEMI (ST elevation myocardial infarction)    Type 2 diabetes mellitus with hyperglycemia    Tobacco use       ASSESSMENT & PLAN:   STEMI -RI    T2DM  Smoker    RECOMMENDATIONS:  DAPT, smoking cessation   Statins   Echo         Rohith Guajardo MD  [facility]  Department of Cardiology  Date of Service: 07/24/2022  9:19 AM

## 2022-07-24 NOTE — PLAN OF CARE
[facility]charge Assessment       Primary Care Provider: Primary Doctor No    Admission Diagnosis: ST elevation myocardial infarction (STEMI) involving other coronary artery [I21.29]    Admission Date: 7/24/2022  Expected Discharge Date:     Discharge Barriers Identified: (P) Unisured    Assessment completed at bedside with Pt and her sister, Andree Munoz. Pt lives with her sister and her step dad and she plans to return home at discharge. Pt does not use any DME at home and reports being independent in her ADLs. Pt states her son, Octaviano Canseco, [phone], is her POA but he lives in [place]. Pt asked for resources regarding medicaid eligibility, SW provided.     Payor: /     Extended Emergency Contact Information  Primary Emergency Contact: Andree Munoz  Mobile Phone: [phone]  Relation: Sister  Preferred language: English   needed? No    Discharge Plan A: (P) Home with family  Discharge Plan B: (P) Home with family    No Pharmacies Listed    Initial Assessment (most recent)       Adult Discharge Assessment - 07/24/22 1400          Discharge Assessment    Assessment Type Discharge Planning Assessment (P)      Confirmed/corrected address, phone number and insurance Yes (P)      Confirmed Demographics Correct on Facesheet (P)      Source of Information patient (P)      Communicated JAKUB with patient/caregiver Date not available/Unable to determine (P)      Reason For Admission STEMI, chest pains (P)      Lives With parent(s);sibling(s) (P)      Facility Arrived From: home (P)      Do you expect to return to your current living situation? Yes (P)      Do you have help at home or someone to help you manage your care at home? Yes (P)      Who are your caregiver(s) and their phone number(s)? Andree Munoz, [phone], sister (P)      Prior to hospitilization cognitive status: Alert/Oriented (P)      Current cognitive status: Alert/Oriented (P)      Walking  or Climbing Stairs Difficulty none (P)      Dressing/Bathing Difficulty none (P)      Equipment Currently Used at Home none (P)      Readmission within 30 days? No (P)      Patient currently being followed by outpatient case management? No (P)      Do you currently have service(s) that help you manage your care at home? No (P)      Do you take prescription medications? No (P)      Do you have prescription coverage? No (P)      Who is going to help you get home at discharge? Andree Munoz, sister (P)      How do you get to doctors appointments? car, drives self (P)      Are you on dialysis? No (P)      Do you take coumadin? No (P)      Discharge Plan A Home with family (P)      Discharge Plan B Home with family (P)      DME Needed Upon Discharge  none (P)      Discharge Plan discussed with: Patient;Sibling (P)      Name(s) and Number(s) Andree Munoz, sister (P)      Discharge Barriers Identified Unisured (P)

## 2022-07-25 VITALS
OXYGEN SATURATION: 98 % | TEMPERATURE: 98 F | BODY MASS INDEX: 24.88 KG/M2 | HEIGHT: 60 IN | SYSTOLIC BLOOD PRESSURE: 113 MMHG | RESPIRATION RATE: 17 BRPM | HEART RATE: 106 BPM | DIASTOLIC BLOOD PRESSURE: 66 MMHG | WEIGHT: 126.75 LBS

## 2022-07-25 LAB
ANION GAP SERPL CALC-SCNC: 4 MMOL/L (ref 8–16)
BUN SERPL-MCNC: 11 MG/DL (ref 6–20)
CALCIUM SERPL-MCNC: 8.1 MG/DL (ref 8.7–10.5)
CHLORIDE SERPL-SCNC: 101 MMOL/L (ref 95–110)
CO2 SERPL-SCNC: 25 MMOL/L (ref 23–29)
CREAT SERPL-MCNC: 0.4 MG/DL (ref 0.5–1.4)
ERYTHROCYTE [DISTWIDTH] IN BLOOD BY AUTOMATED COUNT: 12.4 % (ref 11.5–14.5)
EST. GFR  (AFRICAN AMERICAN): >60 ML/MIN/1.73 M^2
EST. GFR  (NON AFRICAN AMERICAN): >60 ML/MIN/1.73 M^2
GLUCOSE SERPL-MCNC: 218 MG/DL (ref 70–110)
GLUCOSE SERPL-MCNC: 218 MG/DL (ref 70–110)
GLUCOSE SERPL-MCNC: 260 MG/DL (ref 70–110)
HCT VFR BLD AUTO: 40.8 % (ref 37–48.5)
HGB BLD-MCNC: 13.6 G/DL (ref 12–16)
MAGNESIUM SERPL-MCNC: 1.9 MG/DL (ref 1.6–2.6)
MCH RBC QN AUTO: 30.1 PG (ref 27–31)
MCHC RBC AUTO-ENTMCNC: 33.3 G/DL (ref 32–36)
MCV RBC AUTO: 90 FL (ref 82–98)
PLATELET # BLD AUTO: 261 K/UL (ref 150–450)
PMV BLD AUTO: 10.4 FL (ref 9.2–12.9)
POTASSIUM SERPL-SCNC: 3.2 MMOL/L (ref 3.5–5.1)
RBC # BLD AUTO: 4.52 M/UL (ref 4–5.4)
SODIUM SERPL-SCNC: 130 MMOL/L (ref 136–145)
WBC # BLD AUTO: 9.68 K/UL (ref 3.9–12.7)

## 2022-07-25 PROCEDURE — 99900035 HC TECH TIME PER 15 MIN (STAT)

## 2022-07-25 PROCEDURE — 25000003 PHARM REV CODE 250

## 2022-07-25 PROCEDURE — 80048 BASIC METABOLIC PNL TOTAL CA: CPT | Performed by: INTERNAL MEDICINE

## 2022-07-25 PROCEDURE — 83735 ASSAY OF MAGNESIUM: CPT | Performed by: INTERNAL MEDICINE

## 2022-07-25 PROCEDURE — 99900031 HC PATIENT EDUCATION (STAT)

## 2022-07-25 PROCEDURE — 25000003 PHARM REV CODE 250: Performed by: INTERNAL MEDICINE

## 2022-07-25 PROCEDURE — 85027 COMPLETE CBC AUTOMATED: CPT | Performed by: INTERNAL MEDICINE

## 2022-07-25 PROCEDURE — 94761 N-INVAS EAR/PLS OXIMETRY MLT: CPT

## 2022-07-25 PROCEDURE — 36415 COLL VENOUS BLD VENIPUNCTURE: CPT | Performed by: INTERNAL MEDICINE

## 2022-07-25 RX ORDER — PEN NEEDLE, DIABETIC 30 GX3/16"
1 NEEDLE, DISPOSABLE MISCELLANEOUS
Qty: 100 EACH | Refills: 1 | Status: SHIPPED | OUTPATIENT
Start: 2022-07-25 | End: 2023-07-15

## 2022-07-25 RX ORDER — INSULIN DETEMIR 100 [IU]/ML
8 INJECTION, SOLUTION SUBCUTANEOUS DAILY
Qty: 15 ML | Refills: 1 | Status: SHIPPED | OUTPATIENT
Start: 2022-07-25 | End: 2023-07-15

## 2022-07-25 RX ORDER — METOPROLOL TARTRATE 25 MG/1
12.5 TABLET, FILM COATED ORAL 2 TIMES DAILY
Qty: 30 TABLET | Refills: 11 | Status: SHIPPED | OUTPATIENT
Start: 2022-07-25 | End: 2023-02-08

## 2022-07-25 RX ORDER — CLOPIDOGREL BISULFATE 75 MG/1
75 TABLET ORAL DAILY
Qty: 30 TABLET | Refills: 11 | Status: SHIPPED | OUTPATIENT
Start: 2022-07-26 | End: 2023-02-08

## 2022-07-25 RX ORDER — INSULIN ASPART 100 [IU]/ML
2 INJECTION, SOLUTION INTRAVENOUS; SUBCUTANEOUS
Qty: 15 ML | Refills: 1 | Status: SHIPPED | OUTPATIENT
Start: 2022-07-25 | End: 2023-07-15

## 2022-07-25 RX ORDER — ATORVASTATIN CALCIUM 40 MG/1
40 TABLET, FILM COATED ORAL NIGHTLY
Qty: 90 TABLET | Refills: 3 | Status: SHIPPED | OUTPATIENT
Start: 2022-07-25 | End: 2023-02-08

## 2022-07-25 RX ORDER — CHLORHEXIDINE GLUCONATE ORAL RINSE 1.2 MG/ML
15 SOLUTION DENTAL 2 TIMES DAILY
Status: DISCONTINUED | OUTPATIENT
Start: 2022-07-25 | End: 2022-07-25 | Stop reason: HOSPADM

## 2022-07-25 RX ORDER — MUPIROCIN 20 MG/G
OINTMENT TOPICAL 2 TIMES DAILY
Status: DISCONTINUED | OUTPATIENT
Start: 2022-07-25 | End: 2022-07-25 | Stop reason: HOSPADM

## 2022-07-25 RX ADMIN — INSULIN ASPART 6 UNITS: 100 INJECTION, SOLUTION INTRAVENOUS; SUBCUTANEOUS at 08:07

## 2022-07-25 RX ADMIN — POTASSIUM BICARBONATE 35 MEQ: 391 TABLET, EFFERVESCENT ORAL at 08:07

## 2022-07-25 RX ADMIN — CHLORHEXIDINE GLUCONATE 15 ML: 1.2 RINSE ORAL at 08:07

## 2022-07-25 RX ADMIN — INSULIN ASPART 4 UNITS: 100 INJECTION, SOLUTION INTRAVENOUS; SUBCUTANEOUS at 12:07

## 2022-07-25 RX ADMIN — METOPROLOL TARTRATE 12.5 MG: 25 TABLET, FILM COATED ORAL at 08:07

## 2022-07-25 RX ADMIN — ASPIRIN 81 MG: 81 TABLET, DELAYED RELEASE ORAL at 08:07

## 2022-07-25 RX ADMIN — CLOPIDOGREL BISULFATE 75 MG: 75 TABLET, FILM COATED ORAL at 08:07

## 2022-07-25 RX ADMIN — MUPIROCIN 1 G: 20 OINTMENT TOPICAL at 08:07

## 2022-07-25 NOTE — PROGRESS NOTES
[facility]  Department of Cardiology  Progress Note    PATIENT NAME: No Munoz  MRN: [id number]  TODAY'S DATE: 07/25/2022  ADMIT DATE: 7/24/2022    SUBJECTIVE     PRINCIPLE PROBLEM: STEMI (ST elevation myocardial infarction)    INTERVAL HISTORY:    7/25/2022  Tele SR  Stable   BP??? Low - not sure if too much medication???  She states that she is been walking     Review of patient's allergies indicates:   Allergen Reactions    Antihistamines - alkylamine     Sulfa (sulfonamide antibiotics)      Facial swelling       REVIEW OF SYSTEMS  CARDIOVASCULAR: No recent chest pain, palpitations, arm, neck, or jaw pain  RESPIRATORY: No recent fever, cough chills, SOB or congestion  : No blood in the urine  GI: No Nausea, vomiting, constipation, diarrhea, blood, or reflux.  MUSCULOSKELETAL: No myalgias  NEURO: No lightheadedness or dizziness  EYES: No Double vision, blurry, vision or headache     OBJECTIVE     VITAL SIGNS (Most Recent)  Temp: 98.5 °F (36.9 °C) (07/25/22 0700)  Pulse: 87 (07/25/22 1000)  Resp: 14 (07/25/22 1000)  BP: (!) 82/51 (07/25/22 1000)  SpO2: 95 % (07/25/22 1000)    VENTILATION STATUS  Resp: 14 (07/25/22 1000)  SpO2: 95 % (07/25/22 1000)       I & O (Last 24H):    Intake/Output Summary (Last 24 hours) at 7/25/2022 1122  Last data filed at 7/25/2022 0800  Gross per 24 hour   Intake 1700 ml   Output 1800 ml   Net -100 ml       WEIGHTS  Wt Readings from Last 1 Encounters:   07/24/22 0852 57.5 kg (126 lb 12.2 oz)       PHYSICAL EXAM  CONSTITUTIONAL: Well built, well nourished in no apparent distress  NECK: no carotid bruit, no JVD  LUNGS: CTA  CHEST WALL: no tenderness  HEART: regular rate and rhythm, S1, S2 normal, no murmur, click, rub or gallop   ABDOMEN: soft, non-tender; bowel sounds normal; no masses,  no organomegaly  EXTREMITIES: Extremities normal, no edema  NEURO: AAO X 3    SCHEDULED MEDS:   aspirin  81 mg Oral Daily    atorvastatin  40 mg Oral QHS    chlorhexidine   15 mL Mouth/Throat BID    clopidogreL  75 mg Oral Daily    enoxaparin  40 mg Subcutaneous Daily    metoprolol tartrate  12.5 mg Oral BID    mupirocin   Nasal BID       CONTINUOUS INFUSIONS:    PRN MEDS:acetaminophen, dextrose 50%, dextrose 50%, insulin aspart U-100, magnesium oxide, magnesium oxide, melatonin, naloxone, ondansetron, polyethylene glycol, potassium bicarbonate, potassium bicarbonate, potassium bicarbonate, potassium, sodium phosphates, potassium, sodium phosphates, potassium, sodium phosphates    LABS AND DIAGNOSTICS     CBC LAST 3 DAYS  Recent Labs   Lab 07/24/22 0342 07/25/22  0400   WBC 8.27 9.68   RBC 5.05 4.52   HGB 15.2 13.6   HCT 44.3 40.8   MCV 88 90   MCH 30.1 30.1   MCHC 34.3 33.3   RDW 12.2 12.4    261   MPV 10.4 10.4   GRAN 53.6  4.4  --    LYMPH 32.4  2.7  --    MONO 9.8  0.8  --    BASO 0.09  --    NRBC 0  --        COAGULATION LAST 3 DAYS  No results for input(s): LABPT, INR, APTT in the last 168 hours.    CHEMISTRY LAST 3 DAYS  Recent Labs   Lab 07/24/22 0342 07/25/22  0400    130*   K 3.9 3.2*   CL 96 101   CO2 25 25   ANIONGAP 15 4*   BUN 17 11   CREATININE 0.8 0.4*   * 218*   CALCIUM 9.5 8.1*   MG  --  1.9   ALBUMIN 3.5  --    PROT 7.9  --    ALKPHOS 144*  --    ALT 43  --    AST 93*  --    BILITOT 0.3  --        CARDIAC PROFILE LAST 3 DAYS  Recent Labs   Lab 07/24/22  0342   TROPONINI 9.421*       ENDOCRINE LAST 3 DAYS  Recent Labs   Lab 07/24/22  0726   TSH 1.890       LAST ARTERIAL BLOOD GAS  ABG  No results for input(s): PH, PO2, PCO2, HCO3, BE in the last 168 hours.    LAST 7 DAYS MICROBIOLOGY   Microbiology Results (last 7 days)     ** No results found for the last 168 hours. **          MOST RECENT IMAGING  Echo  · The left ventricle is normal in size with concentric remodeling and   normal systolic function.  · The estimated ejection fraction is 55%.  · Grade I left ventricular diastolic dysfunction.  · Normal right ventricular size.  · Normal  central venous pressure (3 mmHg).  · The estimated PA systolic pressure is 21 mmHg.  · No thrombus present in the inferior vena cava.  · No mass seen in the inferior vena cava.     Cardiac catheterization  · Culprit lesion is 99% reamus intermedius s/p succesful intervention PTCA   2.0 mm balloon and stent 2.25 x 15 mm  · A second severe lesion in the Prox Cx to Mid Cx lesion was 95% stenosed   with 0% stenosis post-intervention and 2.5 x 18 mm YEVGENIY was placed.  · There was two significant vessel coronary artery disease and   non-obstructuve CAD in other arteries.  · The Prox RCA to Mid RCA lesion was 50% stenosed.  · The Ost LAD to Prox LAD lesion was 50% stenosed.  · The estimated blood loss was none.     The procedure log was documented by Documenter: RT Mireille and   verified by Rohith Guajardo MD.    Date: 7/24/2022  Time: 6:46 AM  X-Ray Chest AP Portable  Chest single view    Clinical data:Chest pain.    FINDINGS: AP view of the chest demonstrates no cardiac, pulmonary, or osseous abnormalities.    IMPRESSION:  1. Normal chest single view.    Electronically signed by:  Stalin Townsend MD  7/24/2022 6:32 AM Mister SpexT Workstation: [id number]      [facility]  Results for orders placed during the hospital encounter of 07/24/22    Echo    Interpretation Summary  · The left ventricle is normal in size with concentric remodeling and normal systolic function.  · The estimated ejection fraction is 55%.  · Grade I left ventricular diastolic dysfunction.  · Normal right ventricular size.  · Normal central venous pressure (3 mmHg).  · The estimated PA systolic pressure is 21 mmHg.  · No thrombus present in the inferior vena cava.  · No mass seen in the inferior vena cava.      CURRENT/PREVIOUS VISIT EKG  Results for orders placed or performed during the hospital encounter of 07/24/22   EKG 12-LEAD on arrival to floor    Collection Time: 07/24/22  6:52 AM    Narrative    Test Reason : I25.10,    Vent. Rate : 087 BPM      Atrial Rate : 087 BPM     P-R Int : 166 ms          QRS Dur : 084 ms      QT Int : 402 ms       P-R-T Axes : 072 085 093 degrees     QTc Int : 483 ms    Normal sinus rhythm  Prolonged QT  Abnormal ECG  When compared with ECG of 24-JUL-2022 04:58,  No significant change was found    Referred By: CAMELIA BURNETT           Confirmed By:        ASSESSMENT/PLAN:     Active Hospital Problems    Diagnosis    *STEMI (ST elevation myocardial infarction)    Type 2 diabetes mellitus with hyperglycemia    Tobacco use       ASSESSMENT & PLAN:   STEMI    RECOMMENDATIONS:  OK to d/c home   Spoke to ICU nurse.   Please follow up with nearest cardiologist. Dr. Yair senior have clinic.         Rohith Guajardo MD  [facility]  Department of Cardiology  Date of Service: 07/25/2022  11:22 AM

## 2022-07-25 NOTE — DISCHARGE SUMMARY
[facility]  Discharge Summary  Patient Name: No Munoz MRN: [id number]   Patient Class: IP- Inpatient  Length of Stay: 1   Admission Date: 7/24/2022  5:00 AM Attending Physician: Dean Sears MD   Primary Care Provider: Primary Doctor No Face-to-Face encounter date: 07/25/2022   Chief Complaint: stemi    Date of Discharge: 7/25/2022  Discharge Disposition: Home or Self Care  Condition: Stable         Hospital Course By Problem with Pertinent Findings     57-year-old  female with tobacco use and type 2 DM who presented to outside ED with left arm and chest discomfort as well as substernal dull nonradiating chest pain due to STEMI, she was taken to cath lab by Dr. Yair Johnston and RI heazy lesion and LCX 95% stenosis , both stented. Additional CAD in the proximal LAD and RCA. She was kept in the hospital for more than 24 houras afterwards with an uneventful hospitlization, no chest pain or SOB, she cleared for discharge by Dr Mazariegos from cardioolgy standpoint, input appreciated, she is stable, doing well, so she was discharged.  We arrange for her to have home medications delivery to her at bedside by our pharmacy including her newly prescribed insulin regimen for A1c was more than 14.      Physical Exam  /66   Pulse 106   Temp 98.4 °F (36.9 °C) (Oral)   Resp 17   Ht 5' (1.524 m)   Wt 57.5 kg (126 lb 12.2 oz)   SpO2 98%   Breastfeeding No   BMI 24.76 kg/m²   Vitals reviewed.    Constitutional: No distress.   HENT: Atraumatic.   Cardiovascular: Normal rate, regular rhythm and normal heart sounds.   Pulmonary/Chest: Effort normal. Clear to auscultation bilaterally. No wheezes.   Abdominal: Soft. Bowel sounds are normal. Exhibits no distension and no mass. No tenderness  Neurological: Alert.   Skin: Skin is warm and dry. No hematoma on the wrist.    Following labs were Reviewed   Recent Labs   Lab 07/25/22  0400   WBC 9.68   HGB 13.6   HCT 40.8      CALCIUM 8.1*    *   K 3.2*   CO2 25      BUN 11   CREATININE 0.4*     POCT Glucose   Date Value Ref Range Status   07/24/2022 369 (H) 70 - 110 mg/dL Final        BMP:   Recent Labs   Lab 07/24/22  0342 07/25/22  0400   * 218*    130*   K 3.9 3.2*   CL 96 101   CO2 25 25   BUN 17 11   CREATININE 0.8 0.4*   CALCIUM 9.5 8.1*   MG  --  1.9    and CBC   Recent Labs   Lab 07/24/22  0342 07/25/22  0400   WBC 8.27 9.68   HGB 15.2 13.6   HCT 44.3 40.8    261       Microbiology Results (last 7 days)     ** No results found for the last 168 hours. **        X-Ray Chest AP Portable   Final Result          No results found for this or any previous visit.      Consultants and Procedures   Consultants:  Cardiology, Dr Mazariegos    Procedures:   Coronary catheterization with RI heazy lesion and LCX 95% stenosis , both stented.    Discharge Information:   Instructions:  1. Take all medications as prescribed  2. Keep all follow-up appointments  3. Return to the hospital or call your primary care physicians if any worsening symptoms.    Follow-Up Appointments:  Cardiology and PCP in 1 week.    Pending laboratory work/Tests to be performed/followed by the Primary care Physician:    The patient was discharged in the care of her family/caregiver, with discharge instructions were reviewed in written and verbal form. All pertinent questions were discussed and prescriptions were provided. The importance of making follow up appointments and compliance of medications has been stressed repeatedly. The patient will follow up in 1 week or sooner as needed with the PCP, and the patient is on board with the plan. Upon discharge, patient needs to be on following medications.    Discharge Medications:     Medication List      START taking these medications    atorvastatin 40 MG tablet  Commonly known as: LIPITOR  Take 1 tablet (40 mg total) by mouth every evening.     blood-glucose meter Misc  Commonly known as: TRUE METRIX GLUCOSE  "METER  Use to test blood sugar as directed     clopidogreL 75 mg tablet  Commonly known as: PLAVIX  Take 1 tablet (75 mg total) by mouth once daily.  Start taking on: July 26, 2022     COMFORT EZ PEN NEEDLES 31 gauge x 5/16" Ndle  Generic drug: pen needle, diabetic  Use with insulin injections     LEVEMIR FLEXTOUCH U-100 INSULN 100 unit/mL (3 mL) Inpn pen  Generic drug: insulin detemir U-100  Inject 8 Units into the skin once daily.     metoprolol tartrate 25 MG tablet  Commonly known as: LOPRESSOR  Take one-half tablet (12.5 mg total) by mouth 2 (two) times daily.     NovoLOG Flexpen U-100 Insulin 100 unit/mL (3 mL) Inpn pen  Generic drug: insulin aspart U-100  Inject 2 Units into the skin 3 (three) times daily with meals.     TRUE METRIX GLUCOSE TEST STRIP Strp  Generic drug: blood sugar diagnostic  Use as directed to test blood sugar 3 to 4 times daily        CONTINUE taking these medications    aspirin 81 MG EC tablet  Commonly known as: ECOTRIN     metFORMIN 1000 MG tablet  Commonly known as: GLUCOPHAGE        ASK your doctor about these medications    TRUEPLUS LANCETS 33 gauge Misc  Generic drug: lancets  Use to test blood sugar 3 to 4 times daily as directed           Where to Get Your Medications      These medications were sent to [facility] [place] [id number] [facility] [address], [facility] [id number]    Hours: Mon-Fri, 8a-5:30p Phone: [phone]   · atorvastatin 40 MG tablet  · blood-glucose meter Misc  · clopidogreL 75 mg tablet  · COMFORT EZ PEN NEEDLES 31 gauge x 5/16" Ndle  · LEVEMIR FLEXTOUCH U-100 INSULN 100 unit/mL (3 mL) Inpn pen  · metoprolol tartrate 25 MG tablet  · NovoLOG Flexpen U-100 Insulin 100 unit/mL (3 mL) Inpn pen  · TRUE METRIX GLUCOSE TEST STRIP Strp  · TRUEPLUS LANCETS 33 gauge Misc           I spent 33 minutes preparing the discharge including reviewing records from previous encounters, preparation of discharge summary, assessing and final examination of the patient, discharge " medicine reconciliation, discussing plan of care, follow up and education and prescriptions.       Dean Sears  [facility] Hospitalist  07/25/2022

## 2022-07-25 NOTE — CARE UPDATE
07/25/22 0811   Patient Assessment/Suction   Level of Consciousness (AVPU) alert   Respiratory Effort Normal;Unlabored   Expansion/Accessory Muscles/Retractions expansion symmetric   Rhythm/Pattern, Respiratory unlabored   PRE-TX-O2   O2 Device (Oxygen Therapy) room air   SpO2 96 %   Pulse Oximetry Type Continuous   $ Pulse Oximetry - Multiple Charge Pulse Oximetry - Multiple   Pulse 106   Resp 20   Positioning   Body Position sitting up in bed   Education   $ Education 15 min   Respiratory Evaluation   $ Care Plan Tech Time 15 min

## 2022-07-25 NOTE — PLAN OF CARE
07/24/22 2012   Patient Assessment/Suction   Level of Consciousness (AVPU) alert   Respiratory Effort Unlabored   Expansion/Accessory Muscles/Retractions expansion symmetric   All Lung Fields Breath Sounds clear   Rhythm/Pattern, Respiratory depth regular;pattern regular   Cough Frequency infrequent   Cough Type good;nonproductive   PRE-TX-O2   O2 Device (Oxygen Therapy) room air   SpO2 97 %   Pulse Oximetry Type Continuous   $ Pulse Oximetry - Multiple Charge Pulse Oximetry - Multiple   Pulse 106   Resp (!) 22   /71   Education   $ Education DME Oxygen;15 min

## 2022-07-25 NOTE — PROGRESS NOTES
Pt discharged home. Pt AAOx4. VSS. IV removed. AVS given and explained to patient. [facility] pharmacy brought patient her new prescriptions. Pt wheeled to [employer] vehicle safely.   Followed protocol

## 2022-07-26 NOTE — ED PROVIDER NOTES
Encounter Date: 7/24/2022       History     Chief Complaint   Patient presents with    Chest Pain     Midsternal chest pain radiating to left arm that started around 10pm last night. Pain 3/10. Pt denies sob, n/v, dizziness.     57-year-old female presents to the ED ambulatory complaining of mid sternal chest pain radiating down the left arm onset was 2200        Review of patient's allergies indicates:   Allergen Reactions    Antihistamines - alkylamine     Sulfa (sulfonamide antibiotics)      Facial swelling     Past Medical History:   Diagnosis Date    Diabetes mellitus      Past Surgical History:   Procedure Laterality Date    CORONARY ANGIOGRAPHY N/A 7/24/2022    Procedure: Left heart cath;  Surgeon: Rohith Guajardo MD;  Location: [facility] CATH/EP LAB;  Service: Cardiology;  Laterality: N/A;     History reviewed. No pertinent family history.  Social History     Tobacco Use    Smoking status: Current Every Day Smoker     Packs/day: 1.00    Smokeless tobacco: Never Used   Substance Use Topics    Alcohol use: Never    Drug use: Never     Review of Systems   Constitutional: Negative for fever.   HENT: Negative for sore throat.    Respiratory: Negative for shortness of breath.    Cardiovascular: Positive for chest pain.   Gastrointestinal: Negative for nausea.   Genitourinary: Negative for dysuria.   Musculoskeletal: Negative for back pain.   Skin: Negative for rash.   Neurological: Negative for weakness.   Hematological: Does not bruise/bleed easily.       Physical Exam     Initial Vitals [07/24/22 0324]   BP Pulse Resp Temp SpO2   (!) 161/93 99 16 98.1 °F (36.7 °C) 96 %      MAP       --         Physical Exam    Nursing note and vitals reviewed.  Constitutional: She appears well-developed and well-nourished. She is not diaphoretic. No distress.   HENT:   Head: Normocephalic and atraumatic.   Eyes: Pupils are equal, round, and reactive to light. Right eye exhibits no discharge. Left eye exhibits no  discharge.   Neck: No tracheal deviation present. No JVD present.   Cardiovascular: Exam reveals no friction rub.    No murmur heard.  Pulmonary/Chest: No stridor. No respiratory distress. She has no wheezes. She has no rales.   Abdominal: Bowel sounds are normal. She exhibits no distension.   Musculoskeletal:         General: Normal range of motion.     Neurological: She is alert.   Skin: Skin is warm.   Psychiatric: She has a normal mood and affect.         ED Course   Procedures  Labs Reviewed   CBC W/ AUTO DIFFERENTIAL - Abnormal; Notable for the following components:       Result Value    Immature Granulocytes 0.7 (*)     Immature Grans (Abs) 0.06 (*)     All other components within normal limits   COMPREHENSIVE METABOLIC PANEL - Abnormal; Notable for the following components:    Glucose 399 (*)     Alkaline Phosphatase 144 (*)     AST 93 (*)     All other components within normal limits   TROPONIN I - Abnormal; Notable for the following components:    Troponin I 9.421 (*)     All other components within normal limits   POCT GLUCOSE - Abnormal; Notable for the following components:    POCT Glucose 369 (*)     All other components within normal limits        ECG Results          EKG 12-lead (Final result)  Result time 07/25/22 11:35:19    Final result by Interface, Lab In [facility] (07/25/22 11:35:19)                 Narrative:    Test Reason : I21.3,    Vent. Rate : 099 BPM     Atrial Rate : 099 BPM     P-R Int : 160 ms          QRS Dur : 086 ms      QT Int : 358 ms       P-R-T Axes : 072 036 059 degrees     QTc Int : 459 ms    Age and gender specific analysis  Normal sinus rhythm  Possible Left atrial enlargement  ST elevation consider lateral injury or acute infarct    ACUTE MI / STEMI    Abnormal ECG  No previous ECGs available  Confirmed by Manjinder Marion MD ([id number]) on 7/25/2022 11:35:13 AM    Referred By: KATY   SELF           Confirmed By:Manjinder Marion MD                            Imaging Results    None           Medications   aspirin tablet 325 mg (325 mg Oral Given 7/24/22 0336)   sodium chloride 0.9% bolus 500 mL (500 mLs Intravenous New Bag 7/24/22 6152)   heparin (porcine) injection 4,000 Units (4,000 Units Intravenous Given 7/24/22 4003)   The evaluation, management and treatment of this patient involved Critical Care services  amounting to 40  minutes of direct involvement.  This time was exclusive of any billable procedures.                       Clinical Impression:   Final diagnoses:  [I21.3] STEMI (ST elevation myocardial infarction)  [I21.29] ST elevation myocardial infarction (STEMI) involving other coronary artery (Primary)          ED Disposition Condition    Transfer to Another Facility Stable              Ash Pino MD  07/26/22 1026       Ash Pino MD  07/26/22 1028       Ash Pino MD  08/11/22 0522

## 2022-07-26 NOTE — PLAN OF CARE
Chart and discharge orders reviewed.  Patient discharged home with no further case management needs.     07/25/22 2118   Final Note   Assessment Type Final Discharge Note   Anticipated Discharge Disposition Home   Post-Acute Status   Discharge Delays None known at this time

## 2022-12-05 ENCOUNTER — HOSPITAL ENCOUNTER (EMERGENCY)
Facility: HOSPITAL | Age: 57
Discharge: HOME | End: 2022-12-05
Attending: EMERGENCY MEDICINE

## 2022-12-05 VITALS
SYSTOLIC BLOOD PRESSURE: 145 MMHG | BODY MASS INDEX: 24.74 KG/M2 | DIASTOLIC BLOOD PRESSURE: 68 MMHG | HEART RATE: 98 BPM | HEIGHT: 60 IN | OXYGEN SATURATION: 100 % | TEMPERATURE: 99 F | RESPIRATION RATE: 18 BRPM | WEIGHT: 126 LBS

## 2022-12-05 DIAGNOSIS — U07.1 COVID-19 VIRUS INFECTION: Primary | ICD-10-CM

## 2022-12-05 LAB
INFLUENZA A, MOLECULAR: NEGATIVE
INFLUENZA B, MOLECULAR: NEGATIVE
SARS-COV-2 RDRP RESP QL NAA+PROBE: POSITIVE
SPECIMEN SOURCE: NORMAL

## 2022-12-05 PROCEDURE — U0002 COVID-19 LAB TEST NON-CDC: HCPCS | Performed by: NURSE PRACTITIONER

## 2022-12-05 PROCEDURE — 87502 INFLUENZA DNA AMP PROBE: CPT | Performed by: NURSE PRACTITIONER

## 2022-12-05 PROCEDURE — 25000003 PHARM REV CODE 250: Performed by: NURSE PRACTITIONER

## 2022-12-05 PROCEDURE — 99282 EMERGENCY DEPT VISIT SF MDM: CPT

## 2022-12-05 RX ORDER — ACETAMINOPHEN 325 MG/1
650 TABLET ORAL
Status: COMPLETED | OUTPATIENT
Start: 2022-12-05 | End: 2022-12-05

## 2022-12-05 RX ADMIN — ACETAMINOPHEN 650 MG: 325 TABLET ORAL at 09:12

## 2022-12-06 DIAGNOSIS — U07.1 COVID-19 VIRUS DETECTED: ICD-10-CM

## 2022-12-06 NOTE — ED PROVIDER NOTES
Encounter Date: 12/5/2022       History     Chief Complaint   Patient presents with    COVID-19 Concerns     Pt tested positive for covid, has stents, and is looking for advice on how to manage covid at home. Pt states that she has not taken anything for fever because she didn't think to.      Patient is a 57 year female presents emergency room with sudden onset of fever, and a positive home COVID test.  Patient states she is been fine all day, went to work, and even shopping with family members this afternoon.  Patient states when she got up she felt a rush of heat over.  Patient states she did check her temperature and temperature was 101.1° at home.  Patient did not take any medication prior to arrival emergency room.  Temperature on triage 100.7.  Patient states she was advised by her cardiologist after having stents placed to follow-up emergency room if she ever develops a fever greater than 101.  Patient denies having any nausea, vomiting, diarrhea, runny nose, sore throat.  She denies any abdominal pain or dysuria.    Review of patient's allergies indicates:   Allergen Reactions    Antihistamines - alkylamine     Sulfa (sulfonamide antibiotics)      Facial swelling     Past Medical History:   Diagnosis Date    Diabetes mellitus      Past Surgical History:   Procedure Laterality Date    CORONARY ANGIOGRAPHY N/A 7/24/2022    Procedure: Left heart cath;  Surgeon: Rohith Guajardo MD;  Location: [facility] CATH/EP LAB;  Service: Cardiology;  Laterality: N/A;     History reviewed. No pertinent family history.  Social History     Tobacco Use    Smoking status: Every Day     Packs/day: 1.00     Types: Cigarettes    Smokeless tobacco: Never   Substance Use Topics    Alcohol use: Never    Drug use: Never     Review of Systems   Constitutional:  Positive for fever. Negative for activity change, appetite change, chills, diaphoresis and fatigue.   HENT: Negative.     Eyes: Negative.    Respiratory: Negative.      Cardiovascular: Negative.    Gastrointestinal: Negative.    Endocrine: Negative.    Genitourinary: Negative.    Musculoskeletal: Negative.    Skin: Negative.    Allergic/Immunologic: Negative for food allergies.   Neurological: Negative.    Hematological: Negative.    Psychiatric/Behavioral: Negative.     All other systems reviewed and are negative.    Physical Exam     Initial Vitals [12/05/22 2110]   BP Pulse Resp Temp SpO2   (!) 154/86 108 16 (!) 100.7 °F (38.2 °C) 99 %      MAP       --         Physical Exam    Nursing note and vitals reviewed.  Constitutional: She appears well-developed and well-nourished. She is not diaphoretic. No distress.   HENT:   Head: Normocephalic and atraumatic.   Mouth/Throat: Oropharynx is clear and moist.   Eyes: Conjunctivae and EOM are normal. Pupils are equal, round, and reactive to light. No scleral icterus.   Neck: Neck supple. No thyromegaly present.   Normal range of motion.  Cardiovascular:  Normal rate, regular rhythm, normal heart sounds and intact distal pulses.     Exam reveals no friction rub.       No murmur heard.  Pulmonary/Chest: Breath sounds normal. No respiratory distress. She has no wheezes. She has no rhonchi. She has no rales.   Abdominal: Abdomen is soft.   Musculoskeletal:         General: No edema. Normal range of motion.      Cervical back: Normal range of motion and neck supple.     Lymphadenopathy:     She has no cervical adenopathy.   Neurological: She is alert and oriented to person, place, and time. She has normal strength. No cranial nerve deficit. GCS score is 15. GCS eye subscore is 4. GCS verbal subscore is 5. GCS motor subscore is 6.   Skin: Skin is warm and dry. Capillary refill takes 2 to 3 seconds.   Psychiatric: She has a normal mood and affect.       ED Course   Procedures  Labs Reviewed   SARS-COV-2 RNA AMPLIFICATION, QUAL - Abnormal; Notable for the following components:       Result Value    SARS-CoV-2 RNA, Amplification, Qual Positive  (*)     All other components within normal limits    Narrative:     Is the patient symptomatic?->Yes   INFLUENZA A & B BY MOLECULAR          Imaging Results    None          Medications   acetaminophen tablet 650 mg (650 mg Oral Given 12/5/22 5379)     Medical Decision Making:   Initial Assessment:   Patient seen examined emergency room.  Assessment as noted above.  She appears to be in no acute distress this time.  Differential Diagnosis:   Fever, URI, COVID, flu, strep, pneumonia, UTI  Clinical Tests:   Lab Tests: Ordered  ED Management:  After patient seen examined emergency room.  Will check the patient for COVID and influenza.  Patient was given Tylenol for fever.    End of shift reported off to Dr. Dempsey.    Dr. Dempsey:  She care assumed at shift change to await lab results.  Patient is positive for COVID-19, confirming her positive test at home.  She is only mildly symptomatic and I believe that she is safe for discharge home.  We discussed quarantine and follow-up with her primary care provider and cardiologist via telephone.  She will return here for any worsening signs and symptoms                        Clinical Impression:   Final diagnoses:  [U07.1] COVID-19 virus infection (Primary)      ED Disposition Condition    Discharge Stable          ED Prescriptions    None       Follow-up Information    None          Coleman Dempsey MD  12/05/22 8358

## 2022-12-06 NOTE — DISCHARGE INSTRUCTIONS
Take over-the-counter medications for symptoms as we discussed, and also notify your doctors about your illness.  Stay home for at least 5 days to help prevent spread of the disease.  Return here for any worsening signs or symptoms.

## 2023-02-08 ENCOUNTER — OFFICE VISIT (OUTPATIENT)
Dept: PODIATRY | Facility: CLINIC | Age: 58
End: 2023-02-08
Payer: COMMERCIAL

## 2023-02-08 ENCOUNTER — HOSPITAL ENCOUNTER (OUTPATIENT)
Dept: RADIOLOGY | Facility: HOSPITAL | Age: 58
Discharge: HOME | End: 2023-02-08
Attending: PODIATRIST
Payer: COMMERCIAL

## 2023-02-08 VITALS
WEIGHT: 133.31 LBS | RESPIRATION RATE: 18 BRPM | HEIGHT: 60 IN | BODY MASS INDEX: 26.17 KG/M2 | DIASTOLIC BLOOD PRESSURE: 92 MMHG | SYSTOLIC BLOOD PRESSURE: 154 MMHG | HEART RATE: 111 BPM

## 2023-02-08 DIAGNOSIS — E11.49 TYPE II DIABETES MELLITUS WITH NEUROLOGICAL MANIFESTATIONS: ICD-10-CM

## 2023-02-08 DIAGNOSIS — M20.12 HALLUX VALGUS OF LEFT FOOT: ICD-10-CM

## 2023-02-08 DIAGNOSIS — L97.522 ULCER OF LEFT FOOT WITH FAT LAYER EXPOSED: ICD-10-CM

## 2023-02-08 DIAGNOSIS — E11.9 COMPREHENSIVE DIABETIC FOOT EXAMINATION, TYPE 2 DM, ENCOUNTER FOR: ICD-10-CM

## 2023-02-08 DIAGNOSIS — L97.522 ULCER OF LEFT FOOT WITH FAT LAYER EXPOSED: Primary | ICD-10-CM

## 2023-02-08 PROCEDURE — 87070 CULTURE OTHR SPECIMN AEROBIC: CPT | Performed by: PODIATRIST

## 2023-02-08 PROCEDURE — 1160F PR REVIEW ALL MEDS BY PRESCRIBER/CLIN PHARMACIST DOCUMENTED: ICD-10-PCS | Mod: CPTII,S$GLB,, | Performed by: PODIATRIST

## 2023-02-08 PROCEDURE — 3008F BODY MASS INDEX DOCD: CPT | Mod: CPTII,S$GLB,, | Performed by: PODIATRIST

## 2023-02-08 PROCEDURE — 3080F PR MOST RECENT DIASTOLIC BLOOD PRESSURE >= 90 MM HG: ICD-10-PCS | Mod: CPTII,S$GLB,, | Performed by: PODIATRIST

## 2023-02-08 PROCEDURE — 87077 CULTURE AEROBIC IDENTIFY: CPT | Performed by: PODIATRIST

## 2023-02-08 PROCEDURE — 3080F DIAST BP >= 90 MM HG: CPT | Mod: CPTII,S$GLB,, | Performed by: PODIATRIST

## 2023-02-08 PROCEDURE — 87186 SC STD MICRODIL/AGAR DIL: CPT | Performed by: PODIATRIST

## 2023-02-08 PROCEDURE — 1159F PR MEDICATION LIST DOCUMENTED IN MEDICAL RECORD: ICD-10-PCS | Mod: CPTII,S$GLB,, | Performed by: PODIATRIST

## 2023-02-08 PROCEDURE — 73630 X-RAY EXAM OF FOOT: CPT | Mod: 26,LT,, | Performed by: RADIOLOGY

## 2023-02-08 PROCEDURE — 99204 OFFICE O/P NEW MOD 45 MIN: CPT | Mod: S$GLB,,, | Performed by: PODIATRIST

## 2023-02-08 PROCEDURE — 99999 PR PBB SHADOW E&M-EST. PATIENT-LVL IV: ICD-10-PCS | Mod: PBBFAC,,, | Performed by: PODIATRIST

## 2023-02-08 PROCEDURE — 1159F MED LIST DOCD IN RCRD: CPT | Mod: CPTII,S$GLB,, | Performed by: PODIATRIST

## 2023-02-08 PROCEDURE — 99999 PR PBB SHADOW E&M-EST. PATIENT-LVL IV: CPT | Mod: PBBFAC,,, | Performed by: PODIATRIST

## 2023-02-08 PROCEDURE — 3008F PR BODY MASS INDEX (BMI) DOCUMENTED: ICD-10-PCS | Mod: CPTII,S$GLB,, | Performed by: PODIATRIST

## 2023-02-08 PROCEDURE — 99204 PR OFFICE/OUTPT VISIT, NEW, LEVL IV, 45-59 MIN: ICD-10-PCS | Mod: S$GLB,,, | Performed by: PODIATRIST

## 2023-02-08 PROCEDURE — 3077F SYST BP >= 140 MM HG: CPT | Mod: CPTII,S$GLB,, | Performed by: PODIATRIST

## 2023-02-08 PROCEDURE — 73630 XR FOOT COMPLETE 3 VIEW LEFT: ICD-10-PCS | Mod: 26,LT,, | Performed by: RADIOLOGY

## 2023-02-08 PROCEDURE — 1160F RVW MEDS BY RX/DR IN RCRD: CPT | Mod: CPTII,S$GLB,, | Performed by: PODIATRIST

## 2023-02-08 PROCEDURE — 73630 X-RAY EXAM OF FOOT: CPT | Mod: TC,LT

## 2023-02-08 PROCEDURE — 3077F PR MOST RECENT SYSTOLIC BLOOD PRESSURE >= 140 MM HG: ICD-10-PCS | Mod: CPTII,S$GLB,, | Performed by: PODIATRIST

## 2023-02-08 RX ORDER — INSULIN DEGLUDEC 100 U/ML
44 INJECTION, SOLUTION SUBCUTANEOUS DAILY
COMMUNITY
End: 2023-07-15

## 2023-02-08 RX ORDER — METOPROLOL TARTRATE 25 MG/1
0.5 TABLET, FILM COATED ORAL 2 TIMES DAILY
COMMUNITY
Start: 2022-08-29 | End: 2023-02-08 | Stop reason: SDUPTHER

## 2023-02-08 RX ORDER — CIPROFLOXACIN 500 MG/1
500 TABLET ORAL 2 TIMES DAILY
Qty: 28 TABLET | Refills: 0 | Status: SHIPPED | OUTPATIENT
Start: 2023-02-08 | End: 2023-02-22

## 2023-02-11 LAB — BACTERIA SPEC AEROBE CULT: ABNORMAL

## 2023-02-12 NOTE — PROGRESS NOTES
Subjective:       Patient ID: No Munoz is a 57 y.o. female.    Chief Complaint: Foot Injury, Diabetes Mellitus, Diabetic Foot Exam, and Foot Ulcer  Patient presents today as referred by endocrinology she has a severe bunion on her left foot that has become ulcerated and most likely infected.  Patient is a diabetic times 10 years she has neuropathy she also has CMT with the family history of this.  Patient states her diabetes is very well controlled she does work at [employer] and states that she stands on her feet 40 hours per week which causes a lot of problems.    Past Medical History:   Diagnosis Date    Diabetes mellitus     Diabetes mellitus, type 2     Hyperlipidemia     Hypertension     Myocardial infarction      Past Surgical History:   Procedure Laterality Date    CORONARY ANGIOGRAPHY N/A 7/24/2022    Procedure: Left heart cath;  Surgeon: Rohith Guajardo MD;  Location: [facility] CATH/EP LAB;  Service: Cardiology;  Laterality: N/A;     History reviewed. No pertinent family history.  Social History     Socioeconomic History    Marital status: Single   Tobacco Use    Smoking status: Former     Packs/day: 1.00     Types: Cigarettes    Smokeless tobacco: Never    Tobacco comments:     Quit July 2022   Substance and Sexual Activity    Alcohol use: Never    Drug use: Never       Current Outpatient Medications   Medication Sig Dispense Refill    aspirin (ECOTRIN) 81 MG EC tablet Take 81 mg by mouth once daily.      blood sugar diagnostic (TRUE METRIX GLUCOSE TEST STRIP) Strp Use as directed to test blood sugar 3 to 4 times daily 100 each 1    blood-glucose meter (TRUE METRIX GLUCOSE METER) Misc Use to test blood sugar as directed 1 each 0    insulin aspart U-100 (NOVOLOG FLEXPEN U-100 INSULIN) 100 unit/mL (3 mL) InPn pen Inject 2 Units into the skin 3 (three) times daily with meals. 15 mL 1    insulin degludec (TRESIBA FLEXTOUCH U-100) 100 unit/mL (3 mL) insulin pen Inject 44 Units into the skin once  "daily.      lancets 33 gauge Misc Use to test blood sugar 3 to 4 times daily as directed 100 each 1    pen needle, diabetic (COMFORT EZ PEN NEEDLES) 31 gauge x 5/16" Ndle Use with insulin injections 100 each 1    ciprofloxacin HCl (CIPRO) 500 MG tablet Take 1 tablet (500 mg total) by mouth 2 (two) times daily. for 14 days 28 tablet 0    insulin detemir U-100 (LEVEMIR FLEXTOUCH U-100 INSULN) 100 unit/mL (3 mL) InPn pen Inject 8 Units into the skin once daily. (Patient not taking: Reported on 2/8/2023) 15 mL 1    metFORMIN (GLUCOPHAGE) 1000 MG tablet Take 1,000 mg by mouth 2 (two) times daily with meals.       No current facility-administered medications for this visit.     Review of patient's allergies indicates:   Allergen Reactions    Antihistamines - alkylamine     Sulfa (sulfonamide antibiotics)      Facial swelling       Review of Systems   Musculoskeletal:  Positive for arthralgias and joint swelling.   Skin:  Positive for color change and wound.   Neurological:  Positive for numbness.   All other systems reviewed and are negative.    Objective:      Vitals:    02/08/23 1605   BP: (!) 154/92   Pulse: (!) 111   Resp: 18   Weight: 60.5 kg (133 lb 4.8 oz)   Height: 5' (1.524 m)     Physical Exam  Vitals and nursing note reviewed.   Constitutional:       Appearance: Normal appearance.   Cardiovascular:      Pulses:           Dorsalis pedis pulses are 2+ on the right side and 2+ on the left side.        Posterior tibial pulses are 1+ on the right side and 1+ on the left side.   Pulmonary:      Effort: Pulmonary effort is normal.   Musculoskeletal:         General: Swelling and tenderness present.      Left foot: Deformity and bunion present.        Feet:    Feet:      Right foot:      Protective Sensation: 4 sites tested.   1 site sensed.     Left foot:      Protective Sensation: 4 sites tested.   1 site sensed.     Skin integrity: Ulcer, skin breakdown, erythema, warmth and callus present.   Skin:     Findings: " Erythema present.   Neurological:      Mental Status: She is alert.      Sensory: Sensory deficit present.   Psychiatric:         Mood and Affect: Mood normal.         Behavior: Behavior normal.                    Assessment:       1. Ulcer of left foot with fat layer exposed    2. Type II diabetes mellitus with neurological manifestations    3. Comprehensive diabetic foot examination, type 2 DM, encounter for    4. Hallux valgus of left foot          Plan:       Patient presents today as referred by endocrinology she has a severe bunion on her left foot that has become ulcerated and most likely infected.  Patient is a diabetic times 10 years she has neuropathy she also has CMT with the family history of this.  Patient states her diabetes is very well controlled she does work at [employer] and states that she stands on her feet 40 hours per week which causes a lot of problems.  A comprehensive new patient diabetic evaluation was performed today.  Patient has a severe bunion deformity with complete dislocation of the 1st MPJ on the patient's left foot we did take x-rays today I reviewed them with the patient showing the patient the severe bunion deformity and how she has dislocation in poor alignment of the 1st MPJ which is now bone-on-bone and the patient has little to no range of motion in this area.  Patient has severe area of ulceration and breakdown which is due to the deformity and the stretching of the skin with excessive pressure due to the bone being out of alignment on the plantar aspect of the 1st MPJ the patient has a deep fissure that is present with surrounding callus tissue and breakdown of that tissue the overall area of breakdown is 2 cm in diameter the fissure is 2 cm long by 5 mm wide by 3 mm deep I did perform a deep culture and sensitivity in this area which was subsequently positive for Pseudomonas I had started the patient on Cipro the patient will be contacted advised to continue taking Cipro  as directed which is covering the Pseudomonas.  X-rays discussed with the patient there were no erosive changes nor findings consistent with osteomyelitis.  I do want see the patient for follow-up in 1 week due to the severity of this condition I advised the patient at some point this bunion is going to need to be addressed but the 1st concern right now is getting the infection under control I am afraid that once we get this healed because of the severity of the bunion and deformity this could easily happen again.  I understand the patient has to work she can not be out of work and we need to try to pad this area as much as we can while still allowing her to wear shoe.  Patient will clean the area with Dakin solution apply silver alginate to the area tucking the silver alginate into the fissure and applying a light dressing over the area this is to be changed daily and the patient is not to be getting her foot wet she had been getting the area wet and I explained to her this can lead to further contamination and it is very important that this area remains dry when she bathes.  Follow-up is going to be 1 week unless the patient has any problems questions or concerns sooner.This note was created using [employer] voice recognition software that occasionally misinterpreted phrases or words.

## 2023-02-13 ENCOUNTER — TELEPHONE (OUTPATIENT)
Dept: PODIATRY | Facility: CLINIC | Age: 58
End: 2023-02-13
Payer: COMMERCIAL

## 2023-02-13 NOTE — TELEPHONE ENCOUNTER
Advised patient of culture results and the need to continue her current antibiotics as directed. Patient verbalized understanding.----- Message from Everardo Sweeney DPM sent at 2/11/2023  7:00 PM CST -----  Please call the patient and advise her culture and sensitivity is positive for Pseudomonas which is being covered by the Cipro that she is currently on.  She is to continue taking antibiotics as directed.  ----- Message -----  From: [facility] Interface  Sent: 2/10/2023   1:41 PM CST  To: Everardo Sweeney DPM

## 2023-02-15 ENCOUNTER — OFFICE VISIT (OUTPATIENT)
Dept: PODIATRY | Facility: CLINIC | Age: 58
End: 2023-02-15
Payer: COMMERCIAL

## 2023-02-15 VITALS
DIASTOLIC BLOOD PRESSURE: 80 MMHG | SYSTOLIC BLOOD PRESSURE: 120 MMHG | HEART RATE: 86 BPM | HEIGHT: 60 IN | BODY MASS INDEX: 26.43 KG/M2 | WEIGHT: 134.63 LBS

## 2023-02-15 DIAGNOSIS — M20.12 HALLUX VALGUS OF LEFT FOOT: ICD-10-CM

## 2023-02-15 DIAGNOSIS — E11.49 TYPE II DIABETES MELLITUS WITH NEUROLOGICAL MANIFESTATIONS: ICD-10-CM

## 2023-02-15 DIAGNOSIS — L97.522 ULCER OF LEFT FOOT WITH FAT LAYER EXPOSED: Primary | ICD-10-CM

## 2023-02-15 DIAGNOSIS — E11.9 COMPREHENSIVE DIABETIC FOOT EXAMINATION, TYPE 2 DM, ENCOUNTER FOR: ICD-10-CM

## 2023-02-15 PROCEDURE — 3079F PR MOST RECENT DIASTOLIC BLOOD PRESSURE 80-89 MM HG: ICD-10-PCS | Mod: CPTII,S$GLB,, | Performed by: PODIATRIST

## 2023-02-15 PROCEDURE — 1159F MED LIST DOCD IN RCRD: CPT | Mod: CPTII,S$GLB,, | Performed by: PODIATRIST

## 2023-02-15 PROCEDURE — 3008F BODY MASS INDEX DOCD: CPT | Mod: CPTII,S$GLB,, | Performed by: PODIATRIST

## 2023-02-15 PROCEDURE — 99214 PR OFFICE/OUTPT VISIT, EST, LEVL IV, 30-39 MIN: ICD-10-PCS | Mod: S$GLB,,, | Performed by: PODIATRIST

## 2023-02-15 PROCEDURE — 3079F DIAST BP 80-89 MM HG: CPT | Mod: CPTII,S$GLB,, | Performed by: PODIATRIST

## 2023-02-15 PROCEDURE — 99999 PR PBB SHADOW E&M-EST. PATIENT-LVL IV: ICD-10-PCS | Mod: PBBFAC,,, | Performed by: PODIATRIST

## 2023-02-15 PROCEDURE — 1159F PR MEDICATION LIST DOCUMENTED IN MEDICAL RECORD: ICD-10-PCS | Mod: CPTII,S$GLB,, | Performed by: PODIATRIST

## 2023-02-15 PROCEDURE — 99999 PR PBB SHADOW E&M-EST. PATIENT-LVL IV: CPT | Mod: PBBFAC,,, | Performed by: PODIATRIST

## 2023-02-15 PROCEDURE — 3074F SYST BP LT 130 MM HG: CPT | Mod: CPTII,S$GLB,, | Performed by: PODIATRIST

## 2023-02-15 PROCEDURE — 99214 OFFICE O/P EST MOD 30 MIN: CPT | Mod: S$GLB,,, | Performed by: PODIATRIST

## 2023-02-15 PROCEDURE — 1160F RVW MEDS BY RX/DR IN RCRD: CPT | Mod: CPTII,S$GLB,, | Performed by: PODIATRIST

## 2023-02-15 PROCEDURE — 3008F PR BODY MASS INDEX (BMI) DOCUMENTED: ICD-10-PCS | Mod: CPTII,S$GLB,, | Performed by: PODIATRIST

## 2023-02-15 PROCEDURE — 1160F PR REVIEW ALL MEDS BY PRESCRIBER/CLIN PHARMACIST DOCUMENTED: ICD-10-PCS | Mod: CPTII,S$GLB,, | Performed by: PODIATRIST

## 2023-02-15 PROCEDURE — 3074F PR MOST RECENT SYSTOLIC BLOOD PRESSURE < 130 MM HG: ICD-10-PCS | Mod: CPTII,S$GLB,, | Performed by: PODIATRIST

## 2023-02-15 RX ORDER — BLOOD-GLUCOSE SENSOR
EACH MISCELLANEOUS
COMMUNITY
Start: 2022-12-09 | End: 2023-07-15

## 2023-02-19 NOTE — PROGRESS NOTES
"Subjective:       Patient ID: No Munoz is a 57 y.o. female.    Chief Complaint: Follow-up (Ulcer of left foot)  Patient presents for follow-up ulceration left foot.  Past Medical History:   Diagnosis Date    Diabetes mellitus     Diabetes mellitus, type 2     Hyperlipidemia     Hypertension     Myocardial infarction      Past Surgical History:   Procedure Laterality Date    CORONARY ANGIOGRAPHY N/A 7/24/2022    Procedure: Left heart cath;  Surgeon: Rohith Guajardo MD;  Location: [facility] CATH/EP LAB;  Service: Cardiology;  Laterality: N/A;     History reviewed. No pertinent family history.  Social History     Socioeconomic History    Marital status: Single   Tobacco Use    Smoking status: Former     Packs/day: 1.00     Types: Cigarettes    Smokeless tobacco: Never    Tobacco comments:     Quit July 2022   Substance and Sexual Activity    Alcohol use: Never    Drug use: Never       Current Outpatient Medications   Medication Sig Dispense Refill    blood sugar diagnostic (TRUE METRIX GLUCOSE TEST STRIP) Strp Use as directed to test blood sugar 3 to 4 times daily 100 each 1    blood-glucose meter (TRUE METRIX GLUCOSE METER) Misc Use to test blood sugar as directed 1 each 0    ciprofloxacin HCl (CIPRO) 500 MG tablet Take 1 tablet (500 mg total) by mouth 2 (two) times daily. for 14 days 28 tablet 0    FREESTYLE ANTHONY 3 SENSOR Naya CHANGE SENSOR EVERY 14 DAYS      insulin aspart U-100 (NOVOLOG FLEXPEN U-100 INSULIN) 100 unit/mL (3 mL) InPn pen Inject 2 Units into the skin 3 (three) times daily with meals. 15 mL 1    insulin degludec (TRESIBA FLEXTOUCH U-100) 100 unit/mL (3 mL) insulin pen Inject 44 Units into the skin once daily.      lancets 33 gauge Misc Use to test blood sugar 3 to 4 times daily as directed 100 each 1    pen needle, diabetic (COMFORT EZ PEN NEEDLES) 31 gauge x 5/16" Ndle Use with insulin injections 100 each 1    aspirin (ECOTRIN) 81 MG EC tablet Take 81 mg by mouth once daily.      " insulin detemir U-100 (LEVEMIR FLEXTOUCH U-100 INSULN) 100 unit/mL (3 mL) InPn pen Inject 8 Units into the skin once daily. (Patient not taking: Reported on 2/8/2023) 15 mL 1    metFORMIN (GLUCOPHAGE) 1000 MG tablet Take 1,000 mg by mouth 2 (two) times daily with meals.       No current facility-administered medications for this visit.     Review of patient's allergies indicates:   Allergen Reactions    Antihistamines - alkylamine     Sulfa (sulfonamide antibiotics)      Facial swelling       Review of Systems   Musculoskeletal:  Positive for arthralgias and joint swelling.   Skin:  Positive for color change and wound.   Neurological:  Positive for numbness.   All other systems reviewed and are negative.    Objective:      Vitals:    02/15/23 1120   BP: 120/80   Pulse: 86   Weight: 61.1 kg (134 lb 9.6 oz)   Height: 5' (1.524 m)     Physical Exam  Vitals and nursing note reviewed.   Constitutional:       Appearance: Normal appearance.   Cardiovascular:      Pulses:           Dorsalis pedis pulses are 2+ on the right side and 2+ on the left side.        Posterior tibial pulses are 1+ on the right side and 1+ on the left side.   Pulmonary:      Effort: Pulmonary effort is normal.   Musculoskeletal:         General: Swelling and tenderness present.      Left foot: Deformity and bunion present.        Feet:    Feet:      Right foot:      Protective Sensation: 4 sites tested.   1 site sensed.     Left foot:      Protective Sensation: 4 sites tested.   1 site sensed.     Skin integrity: Ulcer, skin breakdown, erythema, warmth and callus present.   Skin:     Findings: Erythema present.   Neurological:      Mental Status: She is alert.      Sensory: Sensory deficit present.   Psychiatric:         Mood and Affect: Mood normal.         Behavior: Behavior normal.                                                  Assessment:       1. Ulcer of left foot with fat layer exposed    2. Type II diabetes mellitus with neurological  manifestations    3. Comprehensive diabetic foot examination, type 2 DM, encounter for    4. Hallux valgus of left foot          Plan:       Patient presents today follow-up on ulceration left foot secondary to severe bunion deformity with dislocation of the 1st MPJ left.  Patient is currently taking Cipro she is tolerating this well I am going to have her go ahead and finish the prescription of Cipro overall she does have less erythema less edema the infection appears to be controlled at this time I did non excisionally debride the entire area removing nonviable skin and tissue the overall area is 4.5 cm long by 3 cm wide I advised the patient the area looks better she is going to have to continue to keep this dry clean dressed every day cleaning the area with Dakin solution applying silver alginate and keeping the area padded she states she thinks it is doing better because she has been padding it I do plan to follow up with her in 2 weeks but if she is any problems questions or concerns sooner she is to contact us immediately.  Wound care provided today.  Patient understands that at some point she is going to need to have this bunion corrected as this will continue to ulcerate repeatedly especially with the amount of time that she is on her feet.  This note was created using [employer] voice recognition software that occasionally misinterpreted phrases or words.

## 2023-03-02 ENCOUNTER — OFFICE VISIT (OUTPATIENT)
Dept: PODIATRY | Facility: CLINIC | Age: 58
End: 2023-03-02
Payer: COMMERCIAL

## 2023-03-02 VITALS
SYSTOLIC BLOOD PRESSURE: 116 MMHG | WEIGHT: 134.69 LBS | HEIGHT: 60 IN | DIASTOLIC BLOOD PRESSURE: 73 MMHG | HEART RATE: 92 BPM | BODY MASS INDEX: 26.44 KG/M2

## 2023-03-02 DIAGNOSIS — M20.12 HALLUX VALGUS OF LEFT FOOT: ICD-10-CM

## 2023-03-02 DIAGNOSIS — E11.49 TYPE II DIABETES MELLITUS WITH NEUROLOGICAL MANIFESTATIONS: ICD-10-CM

## 2023-03-02 DIAGNOSIS — E11.9 COMPREHENSIVE DIABETIC FOOT EXAMINATION, TYPE 2 DM, ENCOUNTER FOR: ICD-10-CM

## 2023-03-02 DIAGNOSIS — L97.522 ULCER OF LEFT FOOT WITH FAT LAYER EXPOSED: Primary | ICD-10-CM

## 2023-03-02 PROCEDURE — 1159F MED LIST DOCD IN RCRD: CPT | Mod: CPTII,S$GLB,, | Performed by: PODIATRIST

## 2023-03-02 PROCEDURE — 87070 CULTURE OTHR SPECIMN AEROBIC: CPT | Performed by: PODIATRIST

## 2023-03-02 PROCEDURE — 1160F RVW MEDS BY RX/DR IN RCRD: CPT | Mod: CPTII,S$GLB,, | Performed by: PODIATRIST

## 2023-03-02 PROCEDURE — 1160F PR REVIEW ALL MEDS BY PRESCRIBER/CLIN PHARMACIST DOCUMENTED: ICD-10-PCS | Mod: CPTII,S$GLB,, | Performed by: PODIATRIST

## 2023-03-02 PROCEDURE — 99214 OFFICE O/P EST MOD 30 MIN: CPT | Mod: S$GLB,,, | Performed by: PODIATRIST

## 2023-03-02 PROCEDURE — 99214 PR OFFICE/OUTPT VISIT, EST, LEVL IV, 30-39 MIN: ICD-10-PCS | Mod: S$GLB,,, | Performed by: PODIATRIST

## 2023-03-02 PROCEDURE — 1159F PR MEDICATION LIST DOCUMENTED IN MEDICAL RECORD: ICD-10-PCS | Mod: CPTII,S$GLB,, | Performed by: PODIATRIST

## 2023-03-02 PROCEDURE — 3078F PR MOST RECENT DIASTOLIC BLOOD PRESSURE < 80 MM HG: ICD-10-PCS | Mod: CPTII,S$GLB,, | Performed by: PODIATRIST

## 2023-03-02 PROCEDURE — 3074F SYST BP LT 130 MM HG: CPT | Mod: CPTII,S$GLB,, | Performed by: PODIATRIST

## 2023-03-02 PROCEDURE — 87077 CULTURE AEROBIC IDENTIFY: CPT | Performed by: PODIATRIST

## 2023-03-02 PROCEDURE — 99999 PR PBB SHADOW E&M-EST. PATIENT-LVL IV: ICD-10-PCS | Mod: PBBFAC,,, | Performed by: PODIATRIST

## 2023-03-02 PROCEDURE — 87186 SC STD MICRODIL/AGAR DIL: CPT | Performed by: PODIATRIST

## 2023-03-02 PROCEDURE — 3008F BODY MASS INDEX DOCD: CPT | Mod: CPTII,S$GLB,, | Performed by: PODIATRIST

## 2023-03-02 PROCEDURE — 3078F DIAST BP <80 MM HG: CPT | Mod: CPTII,S$GLB,, | Performed by: PODIATRIST

## 2023-03-02 PROCEDURE — 99999 PR PBB SHADOW E&M-EST. PATIENT-LVL IV: CPT | Mod: PBBFAC,,, | Performed by: PODIATRIST

## 2023-03-02 PROCEDURE — 3074F PR MOST RECENT SYSTOLIC BLOOD PRESSURE < 130 MM HG: ICD-10-PCS | Mod: CPTII,S$GLB,, | Performed by: PODIATRIST

## 2023-03-02 PROCEDURE — 3008F PR BODY MASS INDEX (BMI) DOCUMENTED: ICD-10-PCS | Mod: CPTII,S$GLB,, | Performed by: PODIATRIST

## 2023-03-02 RX ORDER — CLOPIDOGREL BISULFATE 75 MG/1
75 TABLET ORAL
COMMUNITY
Start: 2023-02-27

## 2023-03-02 RX ORDER — METOPROLOL TARTRATE 25 MG/1
12.5 TABLET, FILM COATED ORAL 2 TIMES DAILY
COMMUNITY
Start: 2023-02-27

## 2023-03-05 NOTE — PROGRESS NOTES
Subjective:       Patient ID: No Munoz is a 57 y.o. female.    Chief Complaint: Follow-up (Ulcer of left foot)  Patient presents for follow-up ulceration left foot.  Past Medical History:   Diagnosis Date    Diabetes mellitus     Diabetes mellitus, type 2     Hyperlipidemia     Hypertension     Myocardial infarction      Past Surgical History:   Procedure Laterality Date    CORONARY ANGIOGRAPHY N/A 7/24/2022    Procedure: Left heart cath;  Surgeon: Rohith Guajardo MD;  Location: [facility] CATH/EP LAB;  Service: Cardiology;  Laterality: N/A;     History reviewed. No pertinent family history.  Social History     Socioeconomic History    Marital status: Single   Tobacco Use    Smoking status: Former     Packs/day: 1.00     Types: Cigarettes    Smokeless tobacco: Never    Tobacco comments:     Quit July 2022   Substance and Sexual Activity    Alcohol use: Never    Drug use: Never       Current Outpatient Medications   Medication Sig Dispense Refill    aspirin (ECOTRIN) 81 MG EC tablet Take 81 mg by mouth once daily.      blood sugar diagnostic (TRUE METRIX GLUCOSE TEST STRIP) Strp Use as directed to test blood sugar 3 to 4 times daily 100 each 1    blood-glucose meter (TRUE METRIX GLUCOSE METER) Misc Use to test blood sugar as directed 1 each 0    clopidogreL (PLAVIX) 75 mg tablet Take 75 mg by mouth.      FREESTYLE ANTHONY 3 SENSOR Naya CHANGE SENSOR EVERY 14 DAYS      lancets 33 gauge Misc Use to test blood sugar 3 to 4 times daily as directed 100 each 1    metFORMIN (GLUCOPHAGE) 1000 MG tablet Take 1,000 mg by mouth 2 (two) times daily with meals.      metoprolol tartrate (LOPRESSOR) 25 MG tablet Take 12.5 mg by mouth 2 (two) times daily.      insulin aspart U-100 (NOVOLOG FLEXPEN U-100 INSULIN) 100 unit/mL (3 mL) InPn pen Inject 2 Units into the skin 3 (three) times daily with meals. (Patient not taking: Reported on 3/2/2023) 15 mL 1    insulin degludec (TRESIBA FLEXTOUCH U-100) 100 unit/mL (3 mL)  "insulin pen Inject 44 Units into the skin once daily.      insulin detemir U-100 (LEVEMIR FLEXTOUCH U-100 INSULN) 100 unit/mL (3 mL) InPn pen Inject 8 Units into the skin once daily. (Patient not taking: Reported on 2/8/2023) 15 mL 1    pen needle, diabetic (COMFORT EZ PEN NEEDLES) 31 gauge x 5/16" Ndle Use with insulin injections (Patient not taking: Reported on 3/2/2023) 100 each 1     No current facility-administered medications for this visit.     Review of patient's allergies indicates:   Allergen Reactions    Antihistamines - alkylamine     Sulfa (sulfonamide antibiotics)      Facial swelling       Review of Systems   Musculoskeletal:  Positive for arthralgias and joint swelling.   Skin:  Positive for color change and wound.   Neurological:  Positive for numbness.   All other systems reviewed and are negative.    Objective:      Vitals:    03/02/23 1356   BP: 116/73   Pulse: 92   Weight: 61.1 kg (134 lb 11.2 oz)   Height: 5' (1.524 m)     Physical Exam  Vitals and nursing note reviewed.   Constitutional:       Appearance: Normal appearance.   Cardiovascular:      Pulses:           Dorsalis pedis pulses are 2+ on the right side and 2+ on the left side.        Posterior tibial pulses are 1+ on the right side and 1+ on the left side.   Pulmonary:      Effort: Pulmonary effort is normal.   Musculoskeletal:         General: Swelling and tenderness present.      Left foot: Deformity and bunion present.        Feet:    Feet:      Right foot:      Protective Sensation: 4 sites tested.   1 site sensed.     Left foot:      Protective Sensation: 4 sites tested.   1 site sensed.     Skin integrity: Ulcer, skin breakdown, erythema, warmth and callus present.   Skin:     Findings: Erythema present.   Neurological:      Mental Status: She is alert.      Sensory: Sensory deficit present.   Psychiatric:         Mood and Affect: Mood normal.         Behavior: Behavior normal. "                                                                            Assessment:       1. Ulcer of left foot with fat layer exposed    2. Hallux valgus of left foot    3. Type II diabetes mellitus with neurological manifestations    4. Comprehensive diabetic foot examination, type 2 DM, encounter for          Plan:       Patient presents today follow-up on ulceration left foot secondary to severe bunion deformity with dislocation of the 1st MPJ left.   I did non excisionally debride the entire area removing nonviable skin and tissue the overall area is 4.0 cm long by 3 cm wide I advised the patient the area looks better she is going to have to continue to keep this dry clean dressed every day cleaning the area with Dakin solution applying silver alginate and keeping the area padded she states she thinks it is doing better because she has been padding it I do plan to follow up with her in 2 weeks but if she is any problems questions or concerns sooner she is to contact us immediately.  Wound care provided today.  I did do another culture of the area to ensure that there was no active bacterial component on the surface of the wound.  Patient did get the area wet by mistake and states that she did have to cut some off of the dry skin off herself.  Follow-up 2 weeks unless the patient has any problems questions or concerns the area is improving albeit slowly because the patient does work 40 hours a week on her feet.  Patient understands that at some point she is going to need to have this bunion corrected as this will continue to ulcerate repeatedly especially with the amount of time that she is on her feet.  This note was created using [employer] voice recognition software that occasionally misinterpreted phrases or words.

## 2023-03-06 ENCOUNTER — TELEPHONE (OUTPATIENT)
Dept: PODIATRY | Facility: CLINIC | Age: 58
End: 2023-03-06
Payer: COMMERCIAL

## 2023-03-06 LAB — BACTERIA SPEC AEROBE CULT: ABNORMAL

## 2023-03-06 RX ORDER — MOXIFLOXACIN HYDROCHLORIDE 400 MG/1
400 TABLET ORAL DAILY
Qty: 14 TABLET | Refills: 0 | Status: SHIPPED | OUTPATIENT
Start: 2023-03-06 | End: 2023-03-20

## 2023-03-06 NOTE — TELEPHONE ENCOUNTER
2 different lab techs told me they can't unsuppress any other antibiotic sensitivities due to the type of bacteria that was detected.

## 2023-03-16 ENCOUNTER — OFFICE VISIT (OUTPATIENT)
Dept: PODIATRY | Facility: CLINIC | Age: 58
End: 2023-03-16
Payer: COMMERCIAL

## 2023-03-16 VITALS
SYSTOLIC BLOOD PRESSURE: 125 MMHG | WEIGHT: 134.69 LBS | DIASTOLIC BLOOD PRESSURE: 77 MMHG | HEIGHT: 60 IN | BODY MASS INDEX: 26.44 KG/M2 | HEART RATE: 97 BPM

## 2023-03-16 DIAGNOSIS — E11.9 COMPREHENSIVE DIABETIC FOOT EXAMINATION, TYPE 2 DM, ENCOUNTER FOR: ICD-10-CM

## 2023-03-16 DIAGNOSIS — E11.49 TYPE II DIABETES MELLITUS WITH NEUROLOGICAL MANIFESTATIONS: ICD-10-CM

## 2023-03-16 DIAGNOSIS — M20.12 HALLUX VALGUS OF LEFT FOOT: Primary | ICD-10-CM

## 2023-03-16 DIAGNOSIS — L97.522 ULCER OF LEFT FOOT WITH FAT LAYER EXPOSED: ICD-10-CM

## 2023-03-16 PROCEDURE — 3074F PR MOST RECENT SYSTOLIC BLOOD PRESSURE < 130 MM HG: ICD-10-PCS | Mod: CPTII,S$GLB,, | Performed by: PODIATRIST

## 2023-03-16 PROCEDURE — 99999 PR PBB SHADOW E&M-EST. PATIENT-LVL IV: CPT | Mod: PBBFAC,,, | Performed by: PODIATRIST

## 2023-03-16 PROCEDURE — 1159F MED LIST DOCD IN RCRD: CPT | Mod: CPTII,S$GLB,, | Performed by: PODIATRIST

## 2023-03-16 PROCEDURE — 3008F BODY MASS INDEX DOCD: CPT | Mod: CPTII,S$GLB,, | Performed by: PODIATRIST

## 2023-03-16 PROCEDURE — 3078F PR MOST RECENT DIASTOLIC BLOOD PRESSURE < 80 MM HG: ICD-10-PCS | Mod: CPTII,S$GLB,, | Performed by: PODIATRIST

## 2023-03-16 PROCEDURE — 11042 DBRDMT SUBQ TIS 1ST 20SQCM/<: CPT | Mod: S$GLB,,, | Performed by: PODIATRIST

## 2023-03-16 PROCEDURE — 1160F RVW MEDS BY RX/DR IN RCRD: CPT | Mod: CPTII,S$GLB,, | Performed by: PODIATRIST

## 2023-03-16 PROCEDURE — 3078F DIAST BP <80 MM HG: CPT | Mod: CPTII,S$GLB,, | Performed by: PODIATRIST

## 2023-03-16 PROCEDURE — 3008F PR BODY MASS INDEX (BMI) DOCUMENTED: ICD-10-PCS | Mod: CPTII,S$GLB,, | Performed by: PODIATRIST

## 2023-03-16 PROCEDURE — 1160F PR REVIEW ALL MEDS BY PRESCRIBER/CLIN PHARMACIST DOCUMENTED: ICD-10-PCS | Mod: CPTII,S$GLB,, | Performed by: PODIATRIST

## 2023-03-16 PROCEDURE — 99999 PR PBB SHADOW E&M-EST. PATIENT-LVL IV: ICD-10-PCS | Mod: PBBFAC,,, | Performed by: PODIATRIST

## 2023-03-16 PROCEDURE — 99214 PR OFFICE/OUTPT VISIT, EST, LEVL IV, 30-39 MIN: ICD-10-PCS | Mod: 25,S$GLB,, | Performed by: PODIATRIST

## 2023-03-16 PROCEDURE — 11042 WOUND DEBRIDEMENT: ICD-10-PCS | Mod: S$GLB,,, | Performed by: PODIATRIST

## 2023-03-16 PROCEDURE — 3074F SYST BP LT 130 MM HG: CPT | Mod: CPTII,S$GLB,, | Performed by: PODIATRIST

## 2023-03-16 PROCEDURE — 99214 OFFICE O/P EST MOD 30 MIN: CPT | Mod: 25,S$GLB,, | Performed by: PODIATRIST

## 2023-03-16 PROCEDURE — 1159F PR MEDICATION LIST DOCUMENTED IN MEDICAL RECORD: ICD-10-PCS | Mod: CPTII,S$GLB,, | Performed by: PODIATRIST

## 2023-03-16 RX ORDER — METFORMIN HYDROCHLORIDE 500 MG/1
1000 TABLET, EXTENDED RELEASE ORAL
COMMUNITY
Start: 2023-03-02 | End: 2023-05-07 | Stop reason: SDUPTHER

## 2023-03-19 NOTE — PROCEDURES
Wound Debridement    Date/Time: 3/16/2023 3:45 PM  Performed by: Everardo Sweeney DPM  Authorized by: Everardo Sweeney DPM     Consent Done?:  Yes (Verbal)  Local anesthesia used?: No      Wound Details:    Location:  Left foot    Location:  Left 1st Metatarsal Head    Type of Debridement:  Excisional       Length (cm):  4       Area (sq cm):  12       Width (cm):  3       Percent Debrided (%):  100       Depth (cm):  0.5       Total Area Debrided (sq cm):  12    Depth of debridement:  Subcutaneous tissue    Devitalized tissue debrided:  Biofilm, Necrotic/Eschar, Slough and Fibrin  Bleeding:  None  Patient tolerance:  Patient tolerated the procedure well with no immediate complications

## 2023-03-19 NOTE — PROGRESS NOTES
Subjective:       Patient ID: No Munoz is a 57 y.o. female.    Chief Complaint: Follow-up (Ulcer of left foot)  Patient presents for follow-up ulceration left foot.  Past Medical History:   Diagnosis Date    Diabetes mellitus     Diabetes mellitus, type 2     Hyperlipidemia     Hypertension     Myocardial infarction      Past Surgical History:   Procedure Laterality Date    CORONARY ANGIOGRAPHY N/A 7/24/2022    Procedure: Left heart cath;  Surgeon: Rohith Guajardo MD;  Location: [facility] CATH/EP LAB;  Service: Cardiology;  Laterality: N/A;     History reviewed. No pertinent family history.  Social History     Socioeconomic History    Marital status: Single   Tobacco Use    Smoking status: Former     Packs/day: 1.00     Types: Cigarettes    Smokeless tobacco: Never    Tobacco comments:     Quit July 2022   Substance and Sexual Activity    Alcohol use: Never    Drug use: Never       Current Outpatient Medications   Medication Sig Dispense Refill    aspirin (ECOTRIN) 81 MG EC tablet Take 81 mg by mouth once daily.      blood sugar diagnostic (TRUE METRIX GLUCOSE TEST STRIP) Strp Use as directed to test blood sugar 3 to 4 times daily 100 each 1    blood-glucose meter (TRUE METRIX GLUCOSE METER) Misc Use to test blood sugar as directed 1 each 0    clopidogreL (PLAVIX) 75 mg tablet Take 75 mg by mouth.      metFORMIN (GLUCOPHAGE-XR) 500 MG ER 24hr tablet 1,000 mg.      metoprolol tartrate (LOPRESSOR) 25 MG tablet Take 12.5 mg by mouth 2 (two) times daily.      moxifloxacin (AVELOX) 400 mg tablet Take 1 tablet (400 mg total) by mouth once daily. for 14 days 14 tablet 0    FREESTYLE ANTHONY 3 SENSOR Naya CHANGE SENSOR EVERY 14 DAYS      insulin aspart U-100 (NOVOLOG FLEXPEN U-100 INSULIN) 100 unit/mL (3 mL) InPn pen Inject 2 Units into the skin 3 (three) times daily with meals. (Patient not taking: Reported on 3/16/2023) 15 mL 1    insulin degludec (TRESIBA FLEXTOUCH U-100) 100 unit/mL (3 mL) insulin pen  "Inject 44 Units into the skin once daily.      insulin detemir U-100 (LEVEMIR FLEXTOUCH U-100 INSULN) 100 unit/mL (3 mL) InPn pen Inject 8 Units into the skin once daily. (Patient not taking: Reported on 3/16/2023) 15 mL 1    lancets 33 gauge Misc Use to test blood sugar 3 to 4 times daily as directed (Patient not taking: Reported on 3/16/2023) 100 each 1    metFORMIN (GLUCOPHAGE) 1000 MG tablet Take 1,000 mg by mouth 2 (two) times daily with meals.      pen needle, diabetic (COMFORT EZ PEN NEEDLES) 31 gauge x 5/16" Ndle Use with insulin injections (Patient not taking: Reported on 3/16/2023) 100 each 1     No current facility-administered medications for this visit.     Review of patient's allergies indicates:   Allergen Reactions    Antihistamines - alkylamine     Sulfa (sulfonamide antibiotics)      Facial swelling       Review of Systems   Musculoskeletal:  Positive for arthralgias and joint swelling.   Skin:  Positive for color change and wound.   Neurological:  Positive for numbness.   All other systems reviewed and are negative.    Objective:      Vitals:    03/16/23 1553   BP: 125/77   Pulse: 97   Weight: 61.1 kg (134 lb 11.2 oz)   Height: 5' (1.524 m)     Physical Exam  Vitals and nursing note reviewed.   Constitutional:       Appearance: Normal appearance.   Cardiovascular:      Pulses:           Dorsalis pedis pulses are 2+ on the right side and 2+ on the left side.        Posterior tibial pulses are 1+ on the right side and 1+ on the left side.   Pulmonary:      Effort: Pulmonary effort is normal.   Musculoskeletal:         General: Swelling and tenderness present.      Left foot: Deformity and bunion present.        Feet:    Feet:      Right foot:      Protective Sensation: 4 sites tested.   1 site sensed.     Left foot:      Protective Sensation: 4 sites tested.   1 site sensed.     Skin integrity: Ulcer, skin breakdown, erythema, warmth and callus present.   Skin:     Findings: Erythema present. "   Neurological:      Mental Status: She is alert.      Sensory: Sensory deficit present.   Psychiatric:         Mood and Affect: Mood normal.         Behavior: Behavior normal.                                                                                                                  Assessment:       1. Ulcer of left foot with fat layer exposed    2. Type II diabetes mellitus with neurological manifestations    3. Comprehensive diabetic foot examination, type 2 DM, encounter for    4. Hallux valgus of left foot          Plan:       Patient presents today follow-up on ulceration left foot secondary to severe bunion deformity with dislocation of the 1st MPJ left.   I did excisionally debride the entire area removing nonviable skin and tissue the overall area is 4.0 cm long by 3 cm wide I advised the patient the area looks better she is going to have to continue to keep this dry clean dressed every day cleaning the area with Dakin solution paint thoroughly with Betadine allowed to dry completely then applying silver alginate and keeping the area padded she states she thinks it is doing better because she has been padding it I do plan to follow up with her in 1 week but if she is any problems questions or concerns sooner she is to contact us immediately.  Wound care provided today.  Wound was excisionally debrided as documented I am going to have her get into her fracture boot we have got to keep pressure off of this area to give this the best chance to heal I may have to look at something more aggressive to dry this out she has approximately 4 days left of her Avelox.  Patient is going to have to wear her fracture boot at work for decreased pressure.  At some point the patient is going to need to have this bunion surgically addressed to prevent this from becoming repeated process of ulceration and subsequent infection.  This note was created using [employer] voice recognition software that occasionally  misinterpreted phrases or words.

## 2023-03-23 ENCOUNTER — OFFICE VISIT (OUTPATIENT)
Dept: PODIATRY | Facility: CLINIC | Age: 58
End: 2023-03-23
Payer: COMMERCIAL

## 2023-03-23 VITALS
DIASTOLIC BLOOD PRESSURE: 70 MMHG | SYSTOLIC BLOOD PRESSURE: 121 MMHG | HEIGHT: 60 IN | HEART RATE: 82 BPM | WEIGHT: 134.69 LBS | BODY MASS INDEX: 26.44 KG/M2

## 2023-03-23 DIAGNOSIS — L97.522 ULCER OF LEFT FOOT WITH FAT LAYER EXPOSED: Primary | ICD-10-CM

## 2023-03-23 DIAGNOSIS — E11.9 COMPREHENSIVE DIABETIC FOOT EXAMINATION, TYPE 2 DM, ENCOUNTER FOR: ICD-10-CM

## 2023-03-23 DIAGNOSIS — E11.49 TYPE II DIABETES MELLITUS WITH NEUROLOGICAL MANIFESTATIONS: ICD-10-CM

## 2023-03-23 PROCEDURE — 99214 PR OFFICE/OUTPT VISIT, EST, LEVL IV, 30-39 MIN: ICD-10-PCS | Mod: S$GLB,,, | Performed by: PODIATRIST

## 2023-03-23 PROCEDURE — 1160F PR REVIEW ALL MEDS BY PRESCRIBER/CLIN PHARMACIST DOCUMENTED: ICD-10-PCS | Mod: CPTII,S$GLB,, | Performed by: PODIATRIST

## 2023-03-23 PROCEDURE — 3078F PR MOST RECENT DIASTOLIC BLOOD PRESSURE < 80 MM HG: ICD-10-PCS | Mod: CPTII,S$GLB,, | Performed by: PODIATRIST

## 2023-03-23 PROCEDURE — 3074F PR MOST RECENT SYSTOLIC BLOOD PRESSURE < 130 MM HG: ICD-10-PCS | Mod: CPTII,S$GLB,, | Performed by: PODIATRIST

## 2023-03-23 PROCEDURE — 99999 PR PBB SHADOW E&M-EST. PATIENT-LVL IV: ICD-10-PCS | Mod: PBBFAC,,, | Performed by: PODIATRIST

## 2023-03-23 PROCEDURE — 3008F BODY MASS INDEX DOCD: CPT | Mod: CPTII,S$GLB,, | Performed by: PODIATRIST

## 2023-03-23 PROCEDURE — 3078F DIAST BP <80 MM HG: CPT | Mod: CPTII,S$GLB,, | Performed by: PODIATRIST

## 2023-03-23 PROCEDURE — 3074F SYST BP LT 130 MM HG: CPT | Mod: CPTII,S$GLB,, | Performed by: PODIATRIST

## 2023-03-23 PROCEDURE — 1160F RVW MEDS BY RX/DR IN RCRD: CPT | Mod: CPTII,S$GLB,, | Performed by: PODIATRIST

## 2023-03-23 PROCEDURE — 99214 OFFICE O/P EST MOD 30 MIN: CPT | Mod: S$GLB,,, | Performed by: PODIATRIST

## 2023-03-23 PROCEDURE — 1159F PR MEDICATION LIST DOCUMENTED IN MEDICAL RECORD: ICD-10-PCS | Mod: CPTII,S$GLB,, | Performed by: PODIATRIST

## 2023-03-23 PROCEDURE — 1159F MED LIST DOCD IN RCRD: CPT | Mod: CPTII,S$GLB,, | Performed by: PODIATRIST

## 2023-03-23 PROCEDURE — 99999 PR PBB SHADOW E&M-EST. PATIENT-LVL IV: CPT | Mod: PBBFAC,,, | Performed by: PODIATRIST

## 2023-03-23 PROCEDURE — 3008F PR BODY MASS INDEX (BMI) DOCUMENTED: ICD-10-PCS | Mod: CPTII,S$GLB,, | Performed by: PODIATRIST

## 2023-03-26 NOTE — PROGRESS NOTES
Subjective:       Patient ID: No Munoz is a 57 y.o. female.    Chief Complaint: Follow-up (Ulcer of left foot)  Patient presents for follow-up ulceration left foot.  Past Medical History:   Diagnosis Date    Diabetes mellitus     Diabetes mellitus, type 2     Hyperlipidemia     Hypertension     Myocardial infarction      Past Surgical History:   Procedure Laterality Date    CORONARY ANGIOGRAPHY N/A 7/24/2022    Procedure: Left heart cath;  Surgeon: Rohith Guajardo MD;  Location: [facility] CATH/EP LAB;  Service: Cardiology;  Laterality: N/A;     History reviewed. No pertinent family history.  Social History     Socioeconomic History    Marital status: Single   Tobacco Use    Smoking status: Former     Packs/day: 1.00     Types: Cigarettes    Smokeless tobacco: Never    Tobacco comments:     Quit July 2022   Substance and Sexual Activity    Alcohol use: Never    Drug use: Never       Current Outpatient Medications   Medication Sig Dispense Refill    aspirin (ECOTRIN) 81 MG EC tablet Take 81 mg by mouth once daily.      blood sugar diagnostic (TRUE METRIX GLUCOSE TEST STRIP) Strp Use as directed to test blood sugar 3 to 4 times daily 100 each 1    blood-glucose meter (TRUE METRIX GLUCOSE METER) Misc Use to test blood sugar as directed 1 each 0    clopidogreL (PLAVIX) 75 mg tablet Take 75 mg by mouth.      FREESTYLE ANTHONY 3 SENSOR Naya CHANGE SENSOR EVERY 14 DAYS      insulin degludec (TRESIBA FLEXTOUCH U-100) 100 unit/mL (3 mL) insulin pen Inject 44 Units into the skin once daily.      lancets 33 gauge Misc Use to test blood sugar 3 to 4 times daily as directed 100 each 1    metFORMIN (GLUCOPHAGE) 1000 MG tablet Take 1,000 mg by mouth 2 (two) times daily with meals.      metFORMIN (GLUCOPHAGE-XR) 500 MG ER 24hr tablet 1,000 mg.      metoprolol tartrate (LOPRESSOR) 25 MG tablet Take 12.5 mg by mouth 2 (two) times daily.      insulin aspart U-100 (NOVOLOG FLEXPEN U-100 INSULIN) 100 unit/mL (3 mL) InPn pen  "Inject 2 Units into the skin 3 (three) times daily with meals. (Patient not taking: Reported on 3/16/2023) 15 mL 1    insulin detemir U-100 (LEVEMIR FLEXTOUCH U-100 INSULN) 100 unit/mL (3 mL) InPn pen Inject 8 Units into the skin once daily. (Patient not taking: Reported on 3/16/2023) 15 mL 1    pen needle, diabetic (COMFORT EZ PEN NEEDLES) 31 gauge x 5/16" Ndle Use with insulin injections (Patient not taking: Reported on 3/16/2023) 100 each 1     No current facility-administered medications for this visit.     Review of patient's allergies indicates:   Allergen Reactions    Antihistamines - alkylamine     Sulfa (sulfonamide antibiotics)      Facial swelling       Review of Systems   Musculoskeletal:  Positive for arthralgias and joint swelling.   Skin:  Positive for color change and wound.   Neurological:  Positive for numbness.   All other systems reviewed and are negative.    Objective:      Vitals:    03/23/23 0849   BP: 121/70   Pulse: 82   Weight: 61.1 kg (134 lb 11.2 oz)   Height: 5' (1.524 m)     Physical Exam  Vitals and nursing note reviewed.   Constitutional:       Appearance: Normal appearance.   Cardiovascular:      Pulses:           Dorsalis pedis pulses are 2+ on the right side and 2+ on the left side.        Posterior tibial pulses are 1+ on the right side and 1+ on the left side.   Pulmonary:      Effort: Pulmonary effort is normal.   Musculoskeletal:         General: Swelling and tenderness present.      Left foot: Deformity and bunion present.        Feet:    Feet:      Right foot:      Protective Sensation: 4 sites tested.   1 site sensed.     Left foot:      Protective Sensation: 4 sites tested.   1 site sensed.     Skin integrity: Ulcer, skin breakdown, erythema, warmth and callus present.   Skin:     Findings: Erythema present.   Neurological:      Mental Status: She is alert.      Sensory: Sensory deficit present.   Psychiatric:         Mood and Affect: Mood normal.         Behavior: Behavior " normal.                                                                                                                                                    Assessment:       1. Ulcer of left foot with fat layer exposed    2. Type II diabetes mellitus with neurological manifestations    3. Comprehensive diabetic foot examination, type 2 DM, encounter for          Plan:       Patient presents today follow-up on ulceration left foot secondary to severe bunion deformity with dislocation of the 1st MPJ left.   I did excisionally debride the entire area removing nonviable skin and tissue the overall area is 4.0 cm long by 3 cm wide I advised the patient the area looks better she is going to have to continue to keep this dry clean dressed every day cleaning the area with Dakin solution paint thoroughly with Betadine allowed to dry completely then applying silver alginate and keeping the area padded she states she thinks it is doing better because she has been padding it I do plan to follow up with her in 2 weeks but if she is any problems questions or concerns sooner she is to contact us immediately.  Wound care provided today.  Wound was non-excisionally debrided as documented.    Patient needs to continue to wear her fracture boot I did advise her the area does look better clearly the boot is helping as is the wound care additionally be sides the wound care plan that were currently doing I am going to have the patient gently pack the small centralized ulceration at the plantar medial aspect of the left foot with iodoform packing strips we dispensed the patient a suture removal kit to aid in packing this we dispensed her iodoform packing strips we showed the patient how to do this she is going to pack it every day and do the  dressing care wound care as prescribed.  Patient advised if there is any questions concerns problems any changes to the area she is to contact us immediately she states it is definitely not been  draining as much as it had previously. At some point the patient is going to need to have this bunion surgically addressed to prevent this from becoming repeated process of ulceration and subsequent infection.  This note was created using [employer] voice recognition software that occasionally misinterpreted phrases or words.

## 2023-04-06 ENCOUNTER — OFFICE VISIT (OUTPATIENT)
Dept: PODIATRY | Facility: CLINIC | Age: 58
End: 2023-04-06
Payer: COMMERCIAL

## 2023-04-06 VITALS
WEIGHT: 134.69 LBS | DIASTOLIC BLOOD PRESSURE: 75 MMHG | HEIGHT: 60 IN | SYSTOLIC BLOOD PRESSURE: 118 MMHG | BODY MASS INDEX: 26.44 KG/M2 | HEART RATE: 100 BPM

## 2023-04-06 DIAGNOSIS — E11.9 COMPREHENSIVE DIABETIC FOOT EXAMINATION, TYPE 2 DM, ENCOUNTER FOR: ICD-10-CM

## 2023-04-06 DIAGNOSIS — L97.522 ULCER OF LEFT FOOT WITH FAT LAYER EXPOSED: Primary | ICD-10-CM

## 2023-04-06 DIAGNOSIS — M20.12 HALLUX VALGUS OF LEFT FOOT: ICD-10-CM

## 2023-04-06 DIAGNOSIS — Z72.0 TOBACCO USE: ICD-10-CM

## 2023-04-06 DIAGNOSIS — E11.49 TYPE II DIABETES MELLITUS WITH NEUROLOGICAL MANIFESTATIONS: ICD-10-CM

## 2023-04-06 PROCEDURE — 3008F BODY MASS INDEX DOCD: CPT | Mod: CPTII,S$GLB,, | Performed by: PODIATRIST

## 2023-04-06 PROCEDURE — 99214 PR OFFICE/OUTPT VISIT, EST, LEVL IV, 30-39 MIN: ICD-10-PCS | Mod: S$GLB,,, | Performed by: PODIATRIST

## 2023-04-06 PROCEDURE — 3074F PR MOST RECENT SYSTOLIC BLOOD PRESSURE < 130 MM HG: ICD-10-PCS | Mod: CPTII,S$GLB,, | Performed by: PODIATRIST

## 2023-04-06 PROCEDURE — 99999 PR PBB SHADOW E&M-EST. PATIENT-LVL IV: ICD-10-PCS | Mod: PBBFAC,,, | Performed by: PODIATRIST

## 2023-04-06 PROCEDURE — 1160F RVW MEDS BY RX/DR IN RCRD: CPT | Mod: CPTII,S$GLB,, | Performed by: PODIATRIST

## 2023-04-06 PROCEDURE — 3008F PR BODY MASS INDEX (BMI) DOCUMENTED: ICD-10-PCS | Mod: CPTII,S$GLB,, | Performed by: PODIATRIST

## 2023-04-06 PROCEDURE — 1159F MED LIST DOCD IN RCRD: CPT | Mod: CPTII,S$GLB,, | Performed by: PODIATRIST

## 2023-04-06 PROCEDURE — 1160F PR REVIEW ALL MEDS BY PRESCRIBER/CLIN PHARMACIST DOCUMENTED: ICD-10-PCS | Mod: CPTII,S$GLB,, | Performed by: PODIATRIST

## 2023-04-06 PROCEDURE — 99999 PR PBB SHADOW E&M-EST. PATIENT-LVL IV: CPT | Mod: PBBFAC,,, | Performed by: PODIATRIST

## 2023-04-06 PROCEDURE — 1159F PR MEDICATION LIST DOCUMENTED IN MEDICAL RECORD: ICD-10-PCS | Mod: CPTII,S$GLB,, | Performed by: PODIATRIST

## 2023-04-06 PROCEDURE — 3078F PR MOST RECENT DIASTOLIC BLOOD PRESSURE < 80 MM HG: ICD-10-PCS | Mod: CPTII,S$GLB,, | Performed by: PODIATRIST

## 2023-04-06 PROCEDURE — 3074F SYST BP LT 130 MM HG: CPT | Mod: CPTII,S$GLB,, | Performed by: PODIATRIST

## 2023-04-06 PROCEDURE — 99214 OFFICE O/P EST MOD 30 MIN: CPT | Mod: S$GLB,,, | Performed by: PODIATRIST

## 2023-04-06 PROCEDURE — 3078F DIAST BP <80 MM HG: CPT | Mod: CPTII,S$GLB,, | Performed by: PODIATRIST

## 2023-04-09 NOTE — PROGRESS NOTES
Subjective:       Patient ID: No Munoz is a 57 y.o. female.    Chief Complaint: Follow-up  Patient presents for follow-up ulceration left foot.  Past Medical History:   Diagnosis Date    Diabetes mellitus     Diabetes mellitus, type 2     Hyperlipidemia     Hypertension     Myocardial infarction      Past Surgical History:   Procedure Laterality Date    CORONARY ANGIOGRAPHY N/A 7/24/2022    Procedure: Left heart cath;  Surgeon: Rohith Guajardo MD;  Location: [facility] CATH/EP LAB;  Service: Cardiology;  Laterality: N/A;     Family History   Problem Relation Age of Onset    Heart disease Mother     Stroke Mother     Diabetes Mother     Heart disease Father     Diabetes Father      Social History     Socioeconomic History    Marital status: Single   Tobacco Use    Smoking status: Former     Packs/day: 1.00     Types: Cigarettes    Smokeless tobacco: Never    Tobacco comments:     Quit July 2022   Substance and Sexual Activity    Alcohol use: Never    Drug use: Never       Current Outpatient Medications   Medication Sig Dispense Refill    aspirin (ECOTRIN) 81 MG EC tablet Take 81 mg by mouth once daily.      blood sugar diagnostic (TRUE METRIX GLUCOSE TEST STRIP) Strp Use as directed to test blood sugar 3 to 4 times daily 100 each 1    blood-glucose meter (TRUE METRIX GLUCOSE METER) Misc Use to test blood sugar as directed 1 each 0    clopidogreL (PLAVIX) 75 mg tablet Take 75 mg by mouth.      FREESTYLE ANTHONY 3 SENSOR Naya CHANGE SENSOR EVERY 14 DAYS      insulin aspart U-100 (NOVOLOG FLEXPEN U-100 INSULIN) 100 unit/mL (3 mL) InPn pen Inject 2 Units into the skin 3 (three) times daily with meals. 15 mL 1    insulin degludec (TRESIBA FLEXTOUCH U-100) 100 unit/mL (3 mL) insulin pen Inject 44 Units into the skin once daily.      insulin detemir U-100 (LEVEMIR FLEXTOUCH U-100 INSULN) 100 unit/mL (3 mL) InPn pen Inject 8 Units into the skin once daily. 15 mL 1    lancets 33 gauge Misc Use to test blood sugar  "3 to 4 times daily as directed 100 each 1    metFORMIN (GLUCOPHAGE) 1000 MG tablet Take 1,000 mg by mouth 2 (two) times daily with meals.      metFORMIN (GLUCOPHAGE-XR) 500 MG ER 24hr tablet 1,000 mg.      metoprolol tartrate (LOPRESSOR) 25 MG tablet Take 12.5 mg by mouth 2 (two) times daily.      pen needle, diabetic (COMFORT EZ PEN NEEDLES) 31 gauge x 5/16" Ndle Use with insulin injections 100 each 1     No current facility-administered medications for this visit.     Review of patient's allergies indicates:   Allergen Reactions    Antihistamines - alkylamine     Sulfa (sulfonamide antibiotics)      Facial swelling       Review of Systems   Musculoskeletal:  Positive for arthralgias and joint swelling.   Skin:  Positive for color change and wound.   Neurological:  Positive for numbness.   All other systems reviewed and are negative.    Objective:      Vitals:    04/06/23 1113   BP: 118/75   Pulse: 100   Weight: 61.1 kg (134 lb 11.2 oz)   Height: 5' (1.524 m)     Physical Exam  Vitals and nursing note reviewed.   Constitutional:       Appearance: Normal appearance.   Cardiovascular:      Pulses:           Dorsalis pedis pulses are 2+ on the right side and 2+ on the left side.        Posterior tibial pulses are 1+ on the right side and 1+ on the left side.   Pulmonary:      Effort: Pulmonary effort is normal.   Musculoskeletal:         General: Swelling and tenderness present.      Left foot: Deformity and bunion present.        Feet:    Feet:      Right foot:      Protective Sensation: 4 sites tested.   1 site sensed.     Left foot:      Protective Sensation: 4 sites tested.   1 site sensed.     Skin integrity: Ulcer, skin breakdown, erythema, warmth and callus present.   Skin:     Findings: Erythema present.   Neurological:      Mental Status: She is alert.      Sensory: Sensory deficit present.   Psychiatric:         Mood and Affect: Mood normal.         Behavior: Behavior normal. "                                                                                                                                                                      Assessment:       1. Ulcer of left foot with fat layer exposed    2. Type II diabetes mellitus with neurological manifestations    3. Comprehensive diabetic foot examination, type 2 DM, encounter for    4. Hallux valgus of left foot    5. Tobacco use          Plan:       Patient presents today follow-up on ulceration left foot secondary to severe bunion deformity with dislocation of the 1st MPJ left.   I did excisionally debride the entire area removing nonviable skin and tissue the overall area is 3.5 cm long by 3.0 cm wide I advised the patient the area looks better she is going to have to continue to keep this dry clean dressed every day cleaning the area with Dakin solution paint thoroughly with Betadine allowed to dry completely then applying silver alginate and keeping the area padded she states she thinks it is doing better because she has been padding it I do plan to follow up with her in 2 weeks but if she is any problems questions or concerns sooner she is to contact us immediately.  Wound care provided today.  Wound was non-excisionally debrided as documented.    Patient needs to continue to wear her fracture boot I did advise her the area does look better clearly the boot is helping as is the wound care additionally be sides the wound care plan that were currently doing I am going to have the patient continue to gently pack the small centralized ulceration at the plantar medial aspect of the left foot with iodoform packing strips.  Patient will change up the way that were dressing the area she will continue to clean the area every day with Dakin solution but I am going to have her apply Xeroform to the largest area of the ulceration once a day alternating this on the next day applying silver alginate after cleaning with Dakin so she is going  to L alternate 1 day Xeroform 1 day silver alginate to help prevent the callus buildup that seems to be inhibiting the healing process but she will continue to pack the centralized wound that is currently 1.5 cm x 1 cm with iodoform packing strips as she is previously been doing.  Patient advised there is no obvious signs of infection she will continue to wear the fracture boot and I will follow up with her in 2 weeks.  At some point the patient is going to need to have this bunion surgically addressed to prevent this from becoming repeated process of ulceration and subsequent infection.  This note was created using [employer] voice recognition software that occasionally misinterpreted phrases or words.

## 2023-04-20 ENCOUNTER — OFFICE VISIT (OUTPATIENT)
Dept: PODIATRY | Facility: CLINIC | Age: 58
End: 2023-04-20
Payer: COMMERCIAL

## 2023-04-20 VITALS — HEIGHT: 60 IN | WEIGHT: 134 LBS | BODY MASS INDEX: 26.31 KG/M2

## 2023-04-20 DIAGNOSIS — E11.49 TYPE II DIABETES MELLITUS WITH NEUROLOGICAL MANIFESTATIONS: ICD-10-CM

## 2023-04-20 DIAGNOSIS — M20.12 HALLUX VALGUS OF LEFT FOOT: ICD-10-CM

## 2023-04-20 DIAGNOSIS — L97.522 ULCER OF LEFT FOOT WITH FAT LAYER EXPOSED: Primary | ICD-10-CM

## 2023-04-20 DIAGNOSIS — E11.9 COMPREHENSIVE DIABETIC FOOT EXAMINATION, TYPE 2 DM, ENCOUNTER FOR: ICD-10-CM

## 2023-04-20 PROCEDURE — 99214 PR OFFICE/OUTPT VISIT, EST, LEVL IV, 30-39 MIN: ICD-10-PCS | Mod: S$GLB,,, | Performed by: PODIATRIST

## 2023-04-20 PROCEDURE — 99214 OFFICE O/P EST MOD 30 MIN: CPT | Mod: S$GLB,,, | Performed by: PODIATRIST

## 2023-04-20 PROCEDURE — 1159F MED LIST DOCD IN RCRD: CPT | Mod: CPTII,S$GLB,, | Performed by: PODIATRIST

## 2023-04-20 PROCEDURE — 1159F PR MEDICATION LIST DOCUMENTED IN MEDICAL RECORD: ICD-10-PCS | Mod: CPTII,S$GLB,, | Performed by: PODIATRIST

## 2023-04-20 PROCEDURE — 3008F BODY MASS INDEX DOCD: CPT | Mod: CPTII,S$GLB,, | Performed by: PODIATRIST

## 2023-04-20 PROCEDURE — 1160F RVW MEDS BY RX/DR IN RCRD: CPT | Mod: CPTII,S$GLB,, | Performed by: PODIATRIST

## 2023-04-20 PROCEDURE — 99999 PR PBB SHADOW E&M-EST. PATIENT-LVL III: CPT | Mod: PBBFAC,,, | Performed by: PODIATRIST

## 2023-04-20 PROCEDURE — 3008F PR BODY MASS INDEX (BMI) DOCUMENTED: ICD-10-PCS | Mod: CPTII,S$GLB,, | Performed by: PODIATRIST

## 2023-04-20 PROCEDURE — 1160F PR REVIEW ALL MEDS BY PRESCRIBER/CLIN PHARMACIST DOCUMENTED: ICD-10-PCS | Mod: CPTII,S$GLB,, | Performed by: PODIATRIST

## 2023-04-20 PROCEDURE — 99999 PR PBB SHADOW E&M-EST. PATIENT-LVL III: ICD-10-PCS | Mod: PBBFAC,,, | Performed by: PODIATRIST

## 2023-04-24 NOTE — PROGRESS NOTES
Subjective:       Patient ID: No Munoz is a 57 y.o. female.    Chief Complaint: Follow-up, Foot Ulcer, and Diabetes Mellitus  Patient presents for follow-up ulceration left foot.  Past Medical History:   Diagnosis Date    Diabetes mellitus     Diabetes mellitus, type 2     Hyperlipidemia     Hypertension     Myocardial infarction      Past Surgical History:   Procedure Laterality Date    CORONARY ANGIOGRAPHY N/A 7/24/2022    Procedure: Left heart cath;  Surgeon: Rohith Guajardo MD;  Location: [facility] CATH/EP LAB;  Service: Cardiology;  Laterality: N/A;     Family History   Problem Relation Age of Onset    Heart disease Mother     Stroke Mother     Diabetes Mother     Heart disease Father     Diabetes Father      Social History     Socioeconomic History    Marital status: Single   Tobacco Use    Smoking status: Former     Packs/day: 1.00     Types: Cigarettes    Smokeless tobacco: Never    Tobacco comments:     Quit July 2022   Substance and Sexual Activity    Alcohol use: Never    Drug use: Never       Current Outpatient Medications   Medication Sig Dispense Refill    aspirin (ECOTRIN) 81 MG EC tablet Take 81 mg by mouth once daily.      blood sugar diagnostic (TRUE METRIX GLUCOSE TEST STRIP) Strp Use as directed to test blood sugar 3 to 4 times daily 100 each 1    blood-glucose meter (TRUE METRIX GLUCOSE METER) Misc Use to test blood sugar as directed 1 each 0    clopidogreL (PLAVIX) 75 mg tablet Take 75 mg by mouth.      FREESTYLE ANTHONY 3 SENSOR Naya CHANGE SENSOR EVERY 14 DAYS      insulin aspart U-100 (NOVOLOG FLEXPEN U-100 INSULIN) 100 unit/mL (3 mL) InPn pen Inject 2 Units into the skin 3 (three) times daily with meals. 15 mL 1    insulin degludec (TRESIBA FLEXTOUCH U-100) 100 unit/mL (3 mL) insulin pen Inject 44 Units into the skin once daily.      insulin detemir U-100 (LEVEMIR FLEXTOUCH U-100 INSULN) 100 unit/mL (3 mL) InPn pen Inject 8 Units into the skin once daily. 15 mL 1    lancets 33  "gauge Misc Use to test blood sugar 3 to 4 times daily as directed 100 each 1    metFORMIN (GLUCOPHAGE) 1000 MG tablet Take 1,000 mg by mouth 2 (two) times daily with meals.      metFORMIN (GLUCOPHAGE-XR) 500 MG ER 24hr tablet 1,000 mg.      metoprolol tartrate (LOPRESSOR) 25 MG tablet Take 12.5 mg by mouth 2 (two) times daily.      pen needle, diabetic (COMFORT EZ PEN NEEDLES) 31 gauge x 5/16" Ndle Use with insulin injections 100 each 1     No current facility-administered medications for this visit.     Review of patient's allergies indicates:   Allergen Reactions    Antihistamines - alkylamine     Sulfa (sulfonamide antibiotics)      Facial swelling       Review of Systems   Musculoskeletal:  Positive for arthralgias and joint swelling.   Skin:  Positive for color change and wound.   Neurological:  Positive for numbness.   All other systems reviewed and are negative.    Objective:      Vitals:    04/20/23 1602   Weight: 60.8 kg (134 lb)   Height: 5' (1.524 m)     Physical Exam  Vitals and nursing note reviewed.   Constitutional:       Appearance: Normal appearance.   Cardiovascular:      Pulses:           Dorsalis pedis pulses are 2+ on the right side and 2+ on the left side.        Posterior tibial pulses are 1+ on the right side and 1+ on the left side.   Pulmonary:      Effort: Pulmonary effort is normal.   Musculoskeletal:         General: Swelling and tenderness present.      Left foot: Deformity and bunion present.        Feet:    Feet:      Right foot:      Protective Sensation: 4 sites tested.   1 site sensed.     Left foot:      Protective Sensation: 4 sites tested.   1 site sensed.     Skin integrity: Ulcer, skin breakdown, erythema, warmth and callus present.   Skin:     Findings: Erythema present.   Neurological:      Mental Status: She is alert.      Sensory: Sensory deficit present.   Psychiatric:         Mood and Affect: Mood normal.         Behavior: Behavior normal. "                                                                                                                                                                            Assessment:       1. Ulcer of left foot with fat layer exposed    2. Hallux valgus of left foot    3. Type II diabetes mellitus with neurological manifestations    4. Comprehensive diabetic foot examination, type 2 DM, encounter for          Plan:       Patient presents today follow-up on ulceration left foot secondary to severe bunion deformity with dislocation of the 1st MPJ left.   I did excisionally debride the entire area removing nonviable skin and tissue the overall area is 2.0 cm long by 2.0 cm wide I advised the patient the area looks better she is going to have to continue to keep this dry clean dressed every day cleaning the area with Dakin solution paint thoroughly with Betadine allowed to dry completely then applying silver alginate and keeping the area padded she states she thinks it is doing better because she has been padding it I do plan to follow up with her in 2 weeks but if she is any problems questions or concerns sooner she is to contact us immediately.  Wound care provided today.  Wound was non-excisionally debrided.    Patient needs to continue to wear her fracture boot I did advise her the area does look better clearly the boot is helping as is the wound care additionally be sides the wound care plan that were currently doing I am going to have the patient gently place a piece of Promogran with silver in the open wound area the larger areas approximately 2 cm in diameter however there is a centralized open area that has shown considerable improvement this is 1 cm in diameter the depth of this has decreased and there is healthy granular tissue at the base.  Patient had been using the Xeroform more often as opposed to every other day I am going to have her use the Xeroform daily unless the area looks too moist and then she  will apply silver alginate the Xeroform or the silver alginate will be placed over the larger 2 cm area while the smaller ulceration will have the Promogran with silver applied.  Patient advised the area looks much better she has a lot less swelling and overall inflammation in the bunion area and the wound is considerably better than it had been previously when it was very moist and wet now it is very dry and showing signs of decreased size and improvement.  Patient advised there is no obvious signs of infection she will continue to wear the fracture boot and I will follow up with her in 2 weeks.  At some point the patient is going to need to have this bunion surgically addressed to prevent this from becoming repeated process of ulceration and subsequent infection.  This note was created using [employer] voice recognition software that occasionally misinterpreted phrases or words.

## 2023-05-04 ENCOUNTER — OFFICE VISIT (OUTPATIENT)
Dept: PODIATRY | Facility: CLINIC | Age: 58
End: 2023-05-04
Payer: COMMERCIAL

## 2023-05-04 VITALS
HEIGHT: 60 IN | SYSTOLIC BLOOD PRESSURE: 117 MMHG | DIASTOLIC BLOOD PRESSURE: 80 MMHG | WEIGHT: 134 LBS | BODY MASS INDEX: 26.31 KG/M2 | HEART RATE: 98 BPM

## 2023-05-04 DIAGNOSIS — M20.12 HALLUX VALGUS OF LEFT FOOT: ICD-10-CM

## 2023-05-04 DIAGNOSIS — E11.49 TYPE II DIABETES MELLITUS WITH NEUROLOGICAL MANIFESTATIONS: ICD-10-CM

## 2023-05-04 DIAGNOSIS — L97.522 ULCER OF LEFT FOOT WITH FAT LAYER EXPOSED: Primary | ICD-10-CM

## 2023-05-04 PROCEDURE — 3074F SYST BP LT 130 MM HG: CPT | Mod: CPTII,S$GLB,, | Performed by: PODIATRIST

## 2023-05-04 PROCEDURE — 3008F BODY MASS INDEX DOCD: CPT | Mod: CPTII,S$GLB,, | Performed by: PODIATRIST

## 2023-05-04 PROCEDURE — 99999 PR PBB SHADOW E&M-EST. PATIENT-LVL IV: ICD-10-PCS | Mod: PBBFAC,,, | Performed by: PODIATRIST

## 2023-05-04 PROCEDURE — 1159F PR MEDICATION LIST DOCUMENTED IN MEDICAL RECORD: ICD-10-PCS | Mod: CPTII,S$GLB,, | Performed by: PODIATRIST

## 2023-05-04 PROCEDURE — 1160F RVW MEDS BY RX/DR IN RCRD: CPT | Mod: CPTII,S$GLB,, | Performed by: PODIATRIST

## 2023-05-04 PROCEDURE — 1159F MED LIST DOCD IN RCRD: CPT | Mod: CPTII,S$GLB,, | Performed by: PODIATRIST

## 2023-05-04 PROCEDURE — 99214 PR OFFICE/OUTPT VISIT, EST, LEVL IV, 30-39 MIN: ICD-10-PCS | Mod: S$GLB,,, | Performed by: PODIATRIST

## 2023-05-04 PROCEDURE — 3008F PR BODY MASS INDEX (BMI) DOCUMENTED: ICD-10-PCS | Mod: CPTII,S$GLB,, | Performed by: PODIATRIST

## 2023-05-04 PROCEDURE — 99999 PR PBB SHADOW E&M-EST. PATIENT-LVL IV: CPT | Mod: PBBFAC,,, | Performed by: PODIATRIST

## 2023-05-04 PROCEDURE — 1160F PR REVIEW ALL MEDS BY PRESCRIBER/CLIN PHARMACIST DOCUMENTED: ICD-10-PCS | Mod: CPTII,S$GLB,, | Performed by: PODIATRIST

## 2023-05-04 PROCEDURE — 99214 OFFICE O/P EST MOD 30 MIN: CPT | Mod: S$GLB,,, | Performed by: PODIATRIST

## 2023-05-04 PROCEDURE — 3079F DIAST BP 80-89 MM HG: CPT | Mod: CPTII,S$GLB,, | Performed by: PODIATRIST

## 2023-05-04 PROCEDURE — 3074F PR MOST RECENT SYSTOLIC BLOOD PRESSURE < 130 MM HG: ICD-10-PCS | Mod: CPTII,S$GLB,, | Performed by: PODIATRIST

## 2023-05-04 PROCEDURE — 3079F PR MOST RECENT DIASTOLIC BLOOD PRESSURE 80-89 MM HG: ICD-10-PCS | Mod: CPTII,S$GLB,, | Performed by: PODIATRIST

## 2023-05-04 RX ORDER — ATORVASTATIN CALCIUM 40 MG/1
TABLET, FILM COATED ORAL
COMMUNITY
Start: 2023-04-19

## 2023-05-04 RX ORDER — EVOLOCUMAB 140 MG/ML
INJECTION, SOLUTION SUBCUTANEOUS
COMMUNITY
Start: 2023-05-01

## 2023-05-04 RX ORDER — EZETIMIBE 10 MG/1
10 TABLET ORAL
COMMUNITY
Start: 2023-04-17 | End: 2023-10-22

## 2023-05-04 RX ORDER — EMPAGLIFLOZIN 10 MG/1
10 TABLET, FILM COATED ORAL EVERY MORNING
COMMUNITY
Start: 2023-05-01

## 2023-05-07 NOTE — PROGRESS NOTES
Subjective:       Patient ID: No Munoz is a 57 y.o. female.    Chief Complaint: Foot Ulcer  Patient presents for follow-up ulceration left foot.  Past Medical History:   Diagnosis Date    Diabetes mellitus     Diabetes mellitus, type 2     Hyperlipidemia     Hypertension     Myocardial infarction      Past Surgical History:   Procedure Laterality Date    CORONARY ANGIOGRAPHY N/A 7/24/2022    Procedure: Left heart cath;  Surgeon: Rohith Guajardo MD;  Location: [facility] CATH/EP LAB;  Service: Cardiology;  Laterality: N/A;     Family History   Problem Relation Age of Onset    Heart disease Mother     Stroke Mother     Diabetes Mother     Heart disease Father     Diabetes Father      Social History     Socioeconomic History    Marital status: Single   Tobacco Use    Smoking status: Former     Packs/day: 1.00     Types: Cigarettes    Smokeless tobacco: Never    Tobacco comments:     Quit July 2022   Substance and Sexual Activity    Alcohol use: Never    Drug use: Never       Current Outpatient Medications   Medication Sig Dispense Refill    aspirin (ECOTRIN) 81 MG EC tablet Take 81 mg by mouth once daily.      atorvastatin (LIPITOR) 40 MG tablet Take by mouth.      blood sugar diagnostic (TRUE METRIX GLUCOSE TEST STRIP) Strp Use as directed to test blood sugar 3 to 4 times daily 100 each 1    blood-glucose meter (TRUE METRIX GLUCOSE METER) Misc Use to test blood sugar as directed 1 each 0    clopidogreL (PLAVIX) 75 mg tablet Take 75 mg by mouth.      ezetimibe (ZETIA) 10 mg tablet Take 10 mg by mouth.      FREESTYLE ANTHONY 3 SENSOR Naya CHANGE SENSOR EVERY 14 DAYS      insulin aspart U-100 (NOVOLOG FLEXPEN U-100 INSULIN) 100 unit/mL (3 mL) InPn pen Inject 2 Units into the skin 3 (three) times daily with meals. 15 mL 1    insulin degludec (TRESIBA FLEXTOUCH U-100) 100 unit/mL (3 mL) insulin pen Inject 44 Units into the skin once daily.      insulin detemir U-100 (LEVEMIR FLEXTOUCH U-100 INSULN) 100 unit/mL  "(3 mL) InPn pen Inject 8 Units into the skin once daily. 15 mL 1    JARDIANCE 10 mg tablet Take 10 mg by mouth every morning.      lancets 33 gauge Misc Use to test blood sugar 3 to 4 times daily as directed 100 each 1    metFORMIN (GLUCOPHAGE) 1000 MG tablet Take 1,000 mg by mouth 2 (two) times daily with meals.      metoprolol tartrate (LOPRESSOR) 25 MG tablet Take 12.5 mg by mouth 2 (two) times daily.      pen needle, diabetic (COMFORT EZ PEN NEEDLES) 31 gauge x 5/16" Ndle Use with insulin injections 100 each 1    REPATHA SURECLICK 140 mg/mL PnIj SMARTSI Milligram(s) SUB-Q Every 2 Weeks      metFORMIN (GLUCOPHAGE-XR) 500 MG ER 24hr tablet 1,000 mg.       No current facility-administered medications for this visit.     Review of patient's allergies indicates:   Allergen Reactions    Antihistamines - alkylamine     Sulfa (sulfonamide antibiotics)      Facial swelling       Review of Systems   Musculoskeletal:  Positive for arthralgias and joint swelling.   Skin:  Positive for color change and wound.   Neurological:  Positive for numbness.   All other systems reviewed and are negative.    Objective:      Vitals:    23 1606   BP: 117/80   Pulse: 98   Weight: 60.8 kg (134 lb)   Height: 5' (1.524 m)     Physical Exam  Vitals and nursing note reviewed.   Constitutional:       Appearance: Normal appearance.   Cardiovascular:      Pulses:           Dorsalis pedis pulses are 2+ on the right side and 2+ on the left side.        Posterior tibial pulses are 1+ on the right side and 1+ on the left side.   Pulmonary:      Effort: Pulmonary effort is normal.   Musculoskeletal:         General: Swelling and tenderness present.      Left foot: Deformity and bunion present.        Feet:    Feet:      Right foot:      Protective Sensation: 4 sites tested.   1 site sensed.     Left foot:      Protective Sensation: 4 sites tested.   1 site sensed.     Skin integrity: Ulcer, skin breakdown, erythema, warmth and callus " present.   Skin:     Findings: Erythema present.   Neurological:      Mental Status: She is alert.      Sensory: Sensory deficit present.   Psychiatric:         Mood and Affect: Mood normal.         Behavior: Behavior normal.                    Assessment:       1. Ulcer of left foot with fat layer exposed    2. Hallux valgus of left foot    3. Type II diabetes mellitus with neurological manifestations          Plan:       Patient presents today follow-up on ulceration left foot secondary to severe bunion deformity with dislocation of the 1st MPJ left.   I did excisionally debride the entire area removing nonviable skin and tissue the overall area is 2.0 cm long by 2.0 cm wide I advised the patient the area looks better she is going to have to continue to keep this dry clean dressed every day cleaning the area with Dakin solution paint thoroughly with Betadine allowed to dry completely then applying silver alginate and keeping the area padded she states she thinks it is doing better because she has been padding it I do plan to follow up with her in 2 weeks but if she is any problems questions or concerns sooner she is to contact us immediately.  Wound care provided today.  Wound was non-excisionally debrided.    Patient needs to continue to wear her fracture boot I did advise her the area does look better clearly the boot is helping as is the wound care additionally be sides the wound care plan that were currently doing I am going to have the patient gently place a piece of Promogran with silver in the open wound area the larger areas approximately 2 cm in diameter however there is a centralized open area that has shown considerable improvement this is 1 cm in diameter the depth of this has decreased and there is healthy granular tissue at the base.  Patient had been using the Xeroform more often as opposed to every other day I am going to have her use the Xeroform daily unless the area looks too moist and then she  will apply silver alginate the Xeroform or the silver alginate will be placed over the larger 2 cm area while the smaller ulceration will have the Promogran with silver applied.  Patient advised the area looks much better she has a lot less swelling and overall inflammation in the bunion area and the wound is considerably better than it had been previously when it was very moist and wet now it is very dry and showing signs of decreased size and improvement.  Patient advised there is no obvious signs of infection she will continue to wear the fracture boot and I will follow up with her in 2 weeks.  At some point the patient is going to need to have this bunion surgically addressed to prevent this from becoming repeated process of ulceration and subsequent infection.  I have discontinued the application of Betadine.  No active signs of infection are noted at this time.  Patient advised certainly the areas continuing to improve considerably.  This note was created using [employer] voice recognition software that occasionally misinterpreted phrases or words.

## 2023-05-18 ENCOUNTER — OFFICE VISIT (OUTPATIENT)
Dept: PODIATRY | Facility: CLINIC | Age: 58
End: 2023-05-18
Payer: COMMERCIAL

## 2023-05-18 VITALS
HEART RATE: 103 BPM | HEIGHT: 60 IN | WEIGHT: 134.06 LBS | DIASTOLIC BLOOD PRESSURE: 75 MMHG | SYSTOLIC BLOOD PRESSURE: 117 MMHG | BODY MASS INDEX: 26.32 KG/M2

## 2023-05-18 DIAGNOSIS — M20.12 HALLUX VALGUS OF LEFT FOOT: ICD-10-CM

## 2023-05-18 DIAGNOSIS — L97.522 ULCER OF LEFT FOOT WITH FAT LAYER EXPOSED: Primary | ICD-10-CM

## 2023-05-18 DIAGNOSIS — E11.49 TYPE II DIABETES MELLITUS WITH NEUROLOGICAL MANIFESTATIONS: ICD-10-CM

## 2023-05-18 DIAGNOSIS — E11.9 COMPREHENSIVE DIABETIC FOOT EXAMINATION, TYPE 2 DM, ENCOUNTER FOR: ICD-10-CM

## 2023-05-18 PROCEDURE — 3008F PR BODY MASS INDEX (BMI) DOCUMENTED: ICD-10-PCS | Mod: CPTII,S$GLB,, | Performed by: PODIATRIST

## 2023-05-18 PROCEDURE — 87070 CULTURE OTHR SPECIMN AEROBIC: CPT | Performed by: PODIATRIST

## 2023-05-18 PROCEDURE — 1159F MED LIST DOCD IN RCRD: CPT | Mod: CPTII,S$GLB,, | Performed by: PODIATRIST

## 2023-05-18 PROCEDURE — 87186 SC STD MICRODIL/AGAR DIL: CPT | Performed by: PODIATRIST

## 2023-05-18 PROCEDURE — 3078F DIAST BP <80 MM HG: CPT | Mod: CPTII,S$GLB,, | Performed by: PODIATRIST

## 2023-05-18 PROCEDURE — 3074F SYST BP LT 130 MM HG: CPT | Mod: CPTII,S$GLB,, | Performed by: PODIATRIST

## 2023-05-18 PROCEDURE — 87077 CULTURE AEROBIC IDENTIFY: CPT | Performed by: PODIATRIST

## 2023-05-18 PROCEDURE — 1160F PR REVIEW ALL MEDS BY PRESCRIBER/CLIN PHARMACIST DOCUMENTED: ICD-10-PCS | Mod: CPTII,S$GLB,, | Performed by: PODIATRIST

## 2023-05-18 PROCEDURE — 3078F PR MOST RECENT DIASTOLIC BLOOD PRESSURE < 80 MM HG: ICD-10-PCS | Mod: CPTII,S$GLB,, | Performed by: PODIATRIST

## 2023-05-18 PROCEDURE — 99999 PR PBB SHADOW E&M-EST. PATIENT-LVL V: ICD-10-PCS | Mod: PBBFAC,,, | Performed by: PODIATRIST

## 2023-05-18 PROCEDURE — 3074F PR MOST RECENT SYSTOLIC BLOOD PRESSURE < 130 MM HG: ICD-10-PCS | Mod: CPTII,S$GLB,, | Performed by: PODIATRIST

## 2023-05-18 PROCEDURE — 99214 PR OFFICE/OUTPT VISIT, EST, LEVL IV, 30-39 MIN: ICD-10-PCS | Mod: S$GLB,,, | Performed by: PODIATRIST

## 2023-05-18 PROCEDURE — 1159F PR MEDICATION LIST DOCUMENTED IN MEDICAL RECORD: ICD-10-PCS | Mod: CPTII,S$GLB,, | Performed by: PODIATRIST

## 2023-05-18 PROCEDURE — 3008F BODY MASS INDEX DOCD: CPT | Mod: CPTII,S$GLB,, | Performed by: PODIATRIST

## 2023-05-18 PROCEDURE — 99999 PR PBB SHADOW E&M-EST. PATIENT-LVL V: CPT | Mod: PBBFAC,,, | Performed by: PODIATRIST

## 2023-05-18 PROCEDURE — 99214 OFFICE O/P EST MOD 30 MIN: CPT | Mod: S$GLB,,, | Performed by: PODIATRIST

## 2023-05-18 PROCEDURE — 1160F RVW MEDS BY RX/DR IN RCRD: CPT | Mod: CPTII,S$GLB,, | Performed by: PODIATRIST

## 2023-05-18 RX ORDER — CIPROFLOXACIN 500 MG/1
500 TABLET ORAL 2 TIMES DAILY
Qty: 28 TABLET | Refills: 0 | Status: SHIPPED | OUTPATIENT
Start: 2023-05-18 | End: 2023-05-25 | Stop reason: SDUPTHER

## 2023-05-21 LAB — BACTERIA SPEC AEROBE CULT: ABNORMAL

## 2023-05-21 NOTE — PROGRESS NOTES
Subjective:       Patient ID: No Munoz is a 57 y.o. female.    Chief Complaint: Foot Ulcer (Ulcer of left foot)  Patient presents for follow-up ulceration left foot.  Past Medical History:   Diagnosis Date    Diabetes mellitus     Diabetes mellitus, type 2     Hyperlipidemia     Hypertension     Myocardial infarction      Past Surgical History:   Procedure Laterality Date    CORONARY ANGIOGRAPHY N/A 7/24/2022    Procedure: Left heart cath;  Surgeon: Rohith Guajardo MD;  Location: [facility] CATH/EP LAB;  Service: Cardiology;  Laterality: N/A;     Family History   Problem Relation Age of Onset    Heart disease Mother     Stroke Mother     Diabetes Mother     Heart disease Father     Diabetes Father      Social History     Socioeconomic History    Marital status: Single   Tobacco Use    Smoking status: Former     Packs/day: 1.00     Types: Cigarettes    Smokeless tobacco: Never    Tobacco comments:     Quit July 2022   Substance and Sexual Activity    Alcohol use: Never    Drug use: Never       Current Outpatient Medications   Medication Sig Dispense Refill    aspirin (ECOTRIN) 81 MG EC tablet Take 81 mg by mouth once daily.      atorvastatin (LIPITOR) 40 MG tablet Take by mouth.      blood sugar diagnostic (TRUE METRIX GLUCOSE TEST STRIP) Strp Use as directed to test blood sugar 3 to 4 times daily 100 each 1    blood-glucose meter (TRUE METRIX GLUCOSE METER) Misc Use to test blood sugar as directed 1 each 0    clopidogreL (PLAVIX) 75 mg tablet Take 75 mg by mouth.      ezetimibe (ZETIA) 10 mg tablet Take 10 mg by mouth.      FREESTYLE ANTHONY 3 SENSOR Naya CHANGE SENSOR EVERY 14 DAYS      insulin aspart U-100 (NOVOLOG FLEXPEN U-100 INSULIN) 100 unit/mL (3 mL) InPn pen Inject 2 Units into the skin 3 (three) times daily with meals. 15 mL 1    insulin degludec (TRESIBA FLEXTOUCH U-100) 100 unit/mL (3 mL) insulin pen Inject 44 Units into the skin once daily.      insulin detemir U-100 (LEVEMIR FLEXTOUCH U-100  "INSULN) 100 unit/mL (3 mL) InPn pen Inject 8 Units into the skin once daily. 15 mL 1    JARDIANCE 10 mg tablet Take 10 mg by mouth every morning.      lancets 33 gauge Misc Use to test blood sugar 3 to 4 times daily as directed 100 each 1    metFORMIN (GLUCOPHAGE) 1000 MG tablet Take 1,000 mg by mouth 2 (two) times daily with meals.      metoprolol tartrate (LOPRESSOR) 25 MG tablet Take 12.5 mg by mouth 2 (two) times daily.      pen needle, diabetic (COMFORT EZ PEN NEEDLES) 31 gauge x 5/16" Ndle Use with insulin injections 100 each 1    REPATHA SURECLICK 140 mg/mL PnIj SMARTSI Milligram(s) SUB-Q Every 2 Weeks      ciprofloxacin HCl (CIPRO) 500 MG tablet Take 1 tablet (500 mg total) by mouth 2 (two) times daily. for 14 days 28 tablet 0     No current facility-administered medications for this visit.     Review of patient's allergies indicates:   Allergen Reactions    Antihistamines - alkylamine     Sulfa (sulfonamide antibiotics)      Facial swelling       Review of Systems   Musculoskeletal:  Positive for arthralgias and joint swelling.   Skin:  Positive for color change and wound.   Neurological:  Positive for numbness.   All other systems reviewed and are negative.    Objective:      Vitals:    23 1557   BP: 117/75   Pulse: 103   Weight: 60.8 kg (134 lb 0.6 oz)   Height: 5' (1.524 m)     Physical Exam  Vitals and nursing note reviewed.   Constitutional:       Appearance: Normal appearance.   Cardiovascular:      Pulses:           Dorsalis pedis pulses are 2+ on the right side and 2+ on the left side.        Posterior tibial pulses are 1+ on the right side and 1+ on the left side.   Pulmonary:      Effort: Pulmonary effort is normal.   Musculoskeletal:         General: Swelling and tenderness present.      Left foot: Deformity and bunion present.        Feet:    Feet:      Right foot:      Protective Sensation: 4 sites tested.   1 site sensed.     Left foot:      Protective Sensation: 4 sites tested.   1 " site sensed.     Skin integrity: Ulcer, skin breakdown, erythema, warmth and callus present.   Skin:     Findings: Erythema present.   Neurological:      Mental Status: She is alert.      Sensory: Sensory deficit present.   Psychiatric:         Mood and Affect: Mood normal.         Behavior: Behavior normal.                                              Assessment:       1. Ulcer of left foot with fat layer exposed    2. Type II diabetes mellitus with neurological manifestations    3. Comprehensive diabetic foot examination, type 2 DM, encounter for    4. Hallux valgus of left foot          Plan:       Patient presents today follow-up on ulceration left foot secondary to severe bunion deformity with dislocation of the 1st MPJ left.   I did excisionally debride the entire area removing nonviable skin and tissue the overall area is 3.0 cm long by 2.5 cm wide I advised the patient the area looks better she is going to have to continue to keep this dry clean dressed every day cleaning the area with Dakin solution.   Patient does have significant increased maceration the area that had been dry is much more wet the wound on the underside of the larger ulcer area appears to have tunneled causing some drainage to develop underneath the area that had been completely closed there is malodor noted I did perform a culture and sensitivity concern for Pseudomonas is present I do want the patient to change the dressing twice a day every day in the morning she is going to clean the area with Dakin's apply it tobramycin wet-to-dry dressing in the evening she is going to clean with Dakin's and apply silver alginate before bed and continue to repeat this twice a day treatment I have started the patient on Cipro I will adjust her antibiotics pending culture and sensitivity.  Patient needs to remain in the fracture boot as previously discussed the wound has gotten larger I want see her in 1 week I am concerned because the wound has  gotten larger I did order plain film x-ray to be completed prior to her next appointment just for re-evaluation and concerned because the patient is a diabetic with a chronic ulceration.  Patient states she really has not been doing anything different she is not been on her feet more but clearly this has gotten worse we have taken several steps backwards as she had been doing better.  This note was created using [employer] voice recognition software that occasionally misinterpreted phrases or words.

## 2023-05-22 ENCOUNTER — TELEPHONE (OUTPATIENT)
Dept: PODIATRY | Facility: CLINIC | Age: 58
End: 2023-05-22
Payer: COMMERCIAL

## 2023-05-22 NOTE — TELEPHONE ENCOUNTER
----- Message from Everardo Sweeney DPM sent at 5/21/2023  8:26 AM CDT -----  Please call the patient and set her up for a follow-up x-ray prior to her next visit in [place] I have put the order in.

## 2023-05-22 NOTE — TELEPHONE ENCOUNTER
----- Message from Everardo Sweeney DPM sent at 5/21/2023  2:36 PM CDT -----  Call the patient and advise her culture was positive for Pseudomonas she is to continue taking the Cipro as directed.  ----- Message -----  From: [facility]  Sent: 5/20/2023   9:40 AM CDT  To: Everardo Sweeney DPM

## 2023-05-23 ENCOUNTER — HOSPITAL ENCOUNTER (OUTPATIENT)
Dept: RADIOLOGY | Facility: HOSPITAL | Age: 58
Discharge: HOME | End: 2023-05-23
Attending: PODIATRIST
Payer: COMMERCIAL

## 2023-05-23 DIAGNOSIS — L97.522 ULCER OF LEFT FOOT WITH FAT LAYER EXPOSED: ICD-10-CM

## 2023-05-23 PROCEDURE — 73630 X-RAY EXAM OF FOOT: CPT | Mod: TC,LT

## 2023-05-23 PROCEDURE — 73630 XR FOOT COMPLETE 3 VIEW LEFT: ICD-10-PCS | Mod: 26,LT,, | Performed by: RADIOLOGY

## 2023-05-23 PROCEDURE — 73630 X-RAY EXAM OF FOOT: CPT | Mod: 26,LT,, | Performed by: RADIOLOGY

## 2023-05-25 ENCOUNTER — OFFICE VISIT (OUTPATIENT)
Dept: PODIATRY | Facility: CLINIC | Age: 58
End: 2023-05-25
Payer: COMMERCIAL

## 2023-05-25 VITALS
BODY MASS INDEX: 26.31 KG/M2 | WEIGHT: 134 LBS | HEIGHT: 60 IN | HEART RATE: 84 BPM | SYSTOLIC BLOOD PRESSURE: 122 MMHG | DIASTOLIC BLOOD PRESSURE: 73 MMHG

## 2023-05-25 DIAGNOSIS — E11.49 TYPE II DIABETES MELLITUS WITH NEUROLOGICAL MANIFESTATIONS: ICD-10-CM

## 2023-05-25 DIAGNOSIS — M20.12 HALLUX VALGUS OF LEFT FOOT: ICD-10-CM

## 2023-05-25 DIAGNOSIS — L97.522 ULCER OF LEFT FOOT WITH FAT LAYER EXPOSED: Primary | ICD-10-CM

## 2023-05-25 DIAGNOSIS — E11.9 COMPREHENSIVE DIABETIC FOOT EXAMINATION, TYPE 2 DM, ENCOUNTER FOR: ICD-10-CM

## 2023-05-25 PROCEDURE — 99999 PR PBB SHADOW E&M-EST. PATIENT-LVL IV: ICD-10-PCS | Mod: PBBFAC,,, | Performed by: PODIATRIST

## 2023-05-25 PROCEDURE — 1160F PR REVIEW ALL MEDS BY PRESCRIBER/CLIN PHARMACIST DOCUMENTED: ICD-10-PCS | Mod: CPTII,S$GLB,, | Performed by: PODIATRIST

## 2023-05-25 PROCEDURE — 3078F PR MOST RECENT DIASTOLIC BLOOD PRESSURE < 80 MM HG: ICD-10-PCS | Mod: CPTII,S$GLB,, | Performed by: PODIATRIST

## 2023-05-25 PROCEDURE — 99214 PR OFFICE/OUTPT VISIT, EST, LEVL IV, 30-39 MIN: ICD-10-PCS | Mod: S$GLB,,, | Performed by: PODIATRIST

## 2023-05-25 PROCEDURE — 3074F PR MOST RECENT SYSTOLIC BLOOD PRESSURE < 130 MM HG: ICD-10-PCS | Mod: CPTII,S$GLB,, | Performed by: PODIATRIST

## 2023-05-25 PROCEDURE — 99999 PR PBB SHADOW E&M-EST. PATIENT-LVL IV: CPT | Mod: PBBFAC,,, | Performed by: PODIATRIST

## 2023-05-25 PROCEDURE — 1160F RVW MEDS BY RX/DR IN RCRD: CPT | Mod: CPTII,S$GLB,, | Performed by: PODIATRIST

## 2023-05-25 PROCEDURE — 3008F PR BODY MASS INDEX (BMI) DOCUMENTED: ICD-10-PCS | Mod: CPTII,S$GLB,, | Performed by: PODIATRIST

## 2023-05-25 PROCEDURE — 3008F BODY MASS INDEX DOCD: CPT | Mod: CPTII,S$GLB,, | Performed by: PODIATRIST

## 2023-05-25 PROCEDURE — 1159F PR MEDICATION LIST DOCUMENTED IN MEDICAL RECORD: ICD-10-PCS | Mod: CPTII,S$GLB,, | Performed by: PODIATRIST

## 2023-05-25 PROCEDURE — 3074F SYST BP LT 130 MM HG: CPT | Mod: CPTII,S$GLB,, | Performed by: PODIATRIST

## 2023-05-25 PROCEDURE — 99214 OFFICE O/P EST MOD 30 MIN: CPT | Mod: S$GLB,,, | Performed by: PODIATRIST

## 2023-05-25 PROCEDURE — 1159F MED LIST DOCD IN RCRD: CPT | Mod: CPTII,S$GLB,, | Performed by: PODIATRIST

## 2023-05-25 PROCEDURE — 3078F DIAST BP <80 MM HG: CPT | Mod: CPTII,S$GLB,, | Performed by: PODIATRIST

## 2023-05-25 RX ORDER — CIPROFLOXACIN 500 MG/1
500 TABLET ORAL 2 TIMES DAILY
Qty: 14 TABLET | Refills: 0 | Status: SHIPPED | OUTPATIENT
Start: 2023-05-25 | End: 2023-05-30 | Stop reason: SDUPTHER

## 2023-05-28 NOTE — PROGRESS NOTES
Subjective:       Patient ID: No Munoz is a 57 y.o. female.    Chief Complaint: Follow-up and Foot Ulcer  Patient presents for follow-up ulceration left foot.  Past Medical History:   Diagnosis Date    Diabetes mellitus     Diabetes mellitus, type 2     Hyperlipidemia     Hypertension     Myocardial infarction      Past Surgical History:   Procedure Laterality Date    CORONARY ANGIOGRAPHY N/A 7/24/2022    Procedure: Left heart cath;  Surgeon: Rohith Guajardo MD;  Location: [facility] CATH/EP LAB;  Service: Cardiology;  Laterality: N/A;     Family History   Problem Relation Age of Onset    Heart disease Mother     Stroke Mother     Diabetes Mother     Heart disease Father     Diabetes Father      Social History     Socioeconomic History    Marital status: Single   Tobacco Use    Smoking status: Former     Packs/day: 1.00     Types: Cigarettes    Smokeless tobacco: Never    Tobacco comments:     Quit July 2022   Substance and Sexual Activity    Alcohol use: Never    Drug use: Never       Current Outpatient Medications   Medication Sig Dispense Refill    aspirin (ECOTRIN) 81 MG EC tablet Take 81 mg by mouth once daily.      atorvastatin (LIPITOR) 40 MG tablet Take by mouth.      blood sugar diagnostic (TRUE METRIX GLUCOSE TEST STRIP) Strp Use as directed to test blood sugar 3 to 4 times daily 100 each 1    blood-glucose meter (TRUE METRIX GLUCOSE METER) Misc Use to test blood sugar as directed 1 each 0    clopidogreL (PLAVIX) 75 mg tablet Take 75 mg by mouth.      ezetimibe (ZETIA) 10 mg tablet Take 10 mg by mouth.      FREESTYLE ANTHONY 3 SENSOR Naya CHANGE SENSOR EVERY 14 DAYS      insulin aspart U-100 (NOVOLOG FLEXPEN U-100 INSULIN) 100 unit/mL (3 mL) InPn pen Inject 2 Units into the skin 3 (three) times daily with meals. 15 mL 1    insulin degludec (TRESIBA FLEXTOUCH U-100) 100 unit/mL (3 mL) insulin pen Inject 44 Units into the skin once daily.      insulin detemir U-100 (LEVEMIR FLEXTOUCH U-100  "INSULN) 100 unit/mL (3 mL) InPn pen Inject 8 Units into the skin once daily. 15 mL 1    JARDIANCE 10 mg tablet Take 10 mg by mouth every morning.      lancets 33 gauge Misc Use to test blood sugar 3 to 4 times daily as directed 100 each 1    metFORMIN (GLUCOPHAGE) 1000 MG tablet Take 1,000 mg by mouth 2 (two) times daily with meals.      metoprolol tartrate (LOPRESSOR) 25 MG tablet Take 12.5 mg by mouth 2 (two) times daily.      pen needle, diabetic (COMFORT EZ PEN NEEDLES) 31 gauge x 5/16" Ndle Use with insulin injections 100 each 1    REPATHA SURECLICK 140 mg/mL PnIj SMARTSI Milligram(s) SUB-Q Every 2 Weeks      ciprofloxacin HCl (CIPRO) 500 MG tablet Take 1 tablet (500 mg total) by mouth 2 (two) times daily. for 7 days 14 tablet 0     No current facility-administered medications for this visit.     Review of patient's allergies indicates:   Allergen Reactions    Antihistamines - alkylamine     Sulfa (sulfonamide antibiotics)      Facial swelling       Review of Systems   Musculoskeletal:  Positive for arthralgias and joint swelling.   Skin:  Positive for color change and wound.   Neurological:  Positive for numbness.   All other systems reviewed and are negative.    Objective:      Vitals:    23 0822   BP: 122/73   Pulse: 84   Weight: 60.8 kg (134 lb)   Height: 5' (1.524 m)     Physical Exam  Vitals and nursing note reviewed.   Constitutional:       Appearance: Normal appearance.   Cardiovascular:      Pulses:           Dorsalis pedis pulses are 2+ on the right side and 2+ on the left side.        Posterior tibial pulses are 1+ on the right side and 1+ on the left side.   Pulmonary:      Effort: Pulmonary effort is normal.   Musculoskeletal:         General: Swelling and tenderness present.      Left foot: Deformity and bunion present.        Feet:    Feet:      Right foot:      Protective Sensation: 4 sites tested.   1 site sensed.     Left foot:      Protective Sensation: 4 sites tested.   1 site " sensed.     Skin integrity: Ulcer, skin breakdown, erythema, warmth and callus present.   Skin:     Findings: Erythema present.   Neurological:      Mental Status: She is alert.      Sensory: Sensory deficit present.   Psychiatric:         Mood and Affect: Mood normal.         Behavior: Behavior normal.     No digital pictures were able to be taken or attached to the patient's chart due to an [employer] system error.       Assessment:       1. Ulcer of left foot with fat layer exposed    2. Type II diabetes mellitus with neurological manifestations    3. Comprehensive diabetic foot examination, type 2 DM, encounter for    4. Hallux valgus of left foot          Plan:       Patient presents today follow-up on ulceration left foot secondary to severe bunion deformity with dislocation of the 1st MPJ left.   I did excisionally debride the entire area removing nonviable skin and tissue the deepest area is 1.5 cm long by .5 cm wide I advised the patient the area looks better she is going to have to continue to keep this dry clean dressed every day cleaning the area with Dakin solution.   Patient has dramatic improvement in comparison to her last evaluation which was some come what concerning because it had gotten worse however the patient has improved considerably I did excisionally debride the wound site revealing healthy underlying pink skin and tissue previously noted tracking and heavy serous drainage has resolved at this time I did extend the patient's Cipro for another 7 days.  Patient will clean the area with Dakin solution in the morning applying silver alginate and a well-padded dressing in the evening the patient will clean the area with Dakin solution and apply a tobramycin wet-to-dry dressing she is going to continue alternating this changing the dressings twice a day.  I do plan to follow up with the patient in 2 weeks unless she has any problems questions or concerns I have advised her she is definitely doing a lot  better than the last time when we took a couple steps back but now were going in the right direction again and I am hopeful this will continue to improve.  Patient will continue wearing her fracture boot as recommended.  This note was created using [employer] voice recognition software that occasionally misinterpreted phrases or words.

## 2023-05-30 RX ORDER — CIPROFLOXACIN 500 MG/1
500 TABLET ORAL 2 TIMES DAILY
Qty: 14 TABLET | Refills: 0 | Status: SHIPPED | OUTPATIENT
Start: 2023-05-30 | End: 2023-06-06

## 2023-06-08 ENCOUNTER — OFFICE VISIT (OUTPATIENT)
Dept: PODIATRY | Facility: CLINIC | Age: 58
End: 2023-06-08
Payer: COMMERCIAL

## 2023-06-08 VITALS
WEIGHT: 134 LBS | SYSTOLIC BLOOD PRESSURE: 117 MMHG | BODY MASS INDEX: 26.31 KG/M2 | HEIGHT: 60 IN | DIASTOLIC BLOOD PRESSURE: 77 MMHG | HEART RATE: 97 BPM

## 2023-06-08 DIAGNOSIS — E11.49 TYPE II DIABETES MELLITUS WITH NEUROLOGICAL MANIFESTATIONS: ICD-10-CM

## 2023-06-08 DIAGNOSIS — E11.9 COMPREHENSIVE DIABETIC FOOT EXAMINATION, TYPE 2 DM, ENCOUNTER FOR: ICD-10-CM

## 2023-06-08 DIAGNOSIS — M20.12 HALLUX VALGUS OF LEFT FOOT: ICD-10-CM

## 2023-06-08 DIAGNOSIS — L97.522 ULCER OF LEFT FOOT WITH FAT LAYER EXPOSED: Primary | ICD-10-CM

## 2023-06-08 PROCEDURE — 1160F RVW MEDS BY RX/DR IN RCRD: CPT | Mod: CPTII,S$GLB,, | Performed by: PODIATRIST

## 2023-06-08 PROCEDURE — 3074F SYST BP LT 130 MM HG: CPT | Mod: CPTII,S$GLB,, | Performed by: PODIATRIST

## 2023-06-08 PROCEDURE — 3008F BODY MASS INDEX DOCD: CPT | Mod: CPTII,S$GLB,, | Performed by: PODIATRIST

## 2023-06-08 PROCEDURE — 99999 PR PBB SHADOW E&M-EST. PATIENT-LVL IV: CPT | Mod: PBBFAC,,, | Performed by: PODIATRIST

## 2023-06-08 PROCEDURE — 1159F PR MEDICATION LIST DOCUMENTED IN MEDICAL RECORD: ICD-10-PCS | Mod: CPTII,S$GLB,, | Performed by: PODIATRIST

## 2023-06-08 PROCEDURE — 1159F MED LIST DOCD IN RCRD: CPT | Mod: CPTII,S$GLB,, | Performed by: PODIATRIST

## 2023-06-08 PROCEDURE — 99999 PR PBB SHADOW E&M-EST. PATIENT-LVL IV: ICD-10-PCS | Mod: PBBFAC,,, | Performed by: PODIATRIST

## 2023-06-08 PROCEDURE — 99214 OFFICE O/P EST MOD 30 MIN: CPT | Mod: S$GLB,,, | Performed by: PODIATRIST

## 2023-06-08 PROCEDURE — 3078F DIAST BP <80 MM HG: CPT | Mod: CPTII,S$GLB,, | Performed by: PODIATRIST

## 2023-06-08 PROCEDURE — 99214 PR OFFICE/OUTPT VISIT, EST, LEVL IV, 30-39 MIN: ICD-10-PCS | Mod: S$GLB,,, | Performed by: PODIATRIST

## 2023-06-08 PROCEDURE — 3074F PR MOST RECENT SYSTOLIC BLOOD PRESSURE < 130 MM HG: ICD-10-PCS | Mod: CPTII,S$GLB,, | Performed by: PODIATRIST

## 2023-06-08 PROCEDURE — 3078F PR MOST RECENT DIASTOLIC BLOOD PRESSURE < 80 MM HG: ICD-10-PCS | Mod: CPTII,S$GLB,, | Performed by: PODIATRIST

## 2023-06-08 PROCEDURE — 3008F PR BODY MASS INDEX (BMI) DOCUMENTED: ICD-10-PCS | Mod: CPTII,S$GLB,, | Performed by: PODIATRIST

## 2023-06-08 PROCEDURE — 1160F PR REVIEW ALL MEDS BY PRESCRIBER/CLIN PHARMACIST DOCUMENTED: ICD-10-PCS | Mod: CPTII,S$GLB,, | Performed by: PODIATRIST

## 2023-06-11 NOTE — PROGRESS NOTES
Subjective:       Patient ID: No Munoz is a 58 y.o. female.    Chief Complaint: Follow-up  Patient presents for follow-up ulceration left foot.  Past Medical History:   Diagnosis Date    Diabetes mellitus     Diabetes mellitus, type 2     Hyperlipidemia     Hypertension     Myocardial infarction      Past Surgical History:   Procedure Laterality Date    CORONARY ANGIOGRAPHY N/A 7/24/2022    Procedure: Left heart cath;  Surgeon: Rohith Guajardo MD;  Location: [facility] CATH/EP LAB;  Service: Cardiology;  Laterality: N/A;     Family History   Problem Relation Age of Onset    Heart disease Mother     Stroke Mother     Diabetes Mother     Heart disease Father     Diabetes Father      Social History     Socioeconomic History    Marital status: Single   Tobacco Use    Smoking status: Former     Packs/day: 1.00     Types: Cigarettes    Smokeless tobacco: Never    Tobacco comments:     Quit July 2022   Substance and Sexual Activity    Alcohol use: Never    Drug use: Never       Current Outpatient Medications   Medication Sig Dispense Refill    aspirin (ECOTRIN) 81 MG EC tablet Take 81 mg by mouth once daily.      atorvastatin (LIPITOR) 40 MG tablet Take by mouth.      blood sugar diagnostic (TRUE METRIX GLUCOSE TEST STRIP) Strp Use as directed to test blood sugar 3 to 4 times daily 100 each 1    blood-glucose meter (TRUE METRIX GLUCOSE METER) Misc Use to test blood sugar as directed 1 each 0    clopidogreL (PLAVIX) 75 mg tablet Take 75 mg by mouth.      ezetimibe (ZETIA) 10 mg tablet Take 10 mg by mouth.      FREESTYLE ANTHONY 3 SENSOR Naya CHANGE SENSOR EVERY 14 DAYS      insulin aspart U-100 (NOVOLOG FLEXPEN U-100 INSULIN) 100 unit/mL (3 mL) InPn pen Inject 2 Units into the skin 3 (three) times daily with meals. 15 mL 1    insulin degludec (TRESIBA FLEXTOUCH U-100) 100 unit/mL (3 mL) insulin pen Inject 44 Units into the skin once daily.      insulin detemir U-100 (LEVEMIR FLEXTOUCH U-100 INSULN) 100 unit/mL  "(3 mL) InPn pen Inject 8 Units into the skin once daily. 15 mL 1    JARDIANCE 10 mg tablet Take 10 mg by mouth every morning.      lancets 33 gauge Misc Use to test blood sugar 3 to 4 times daily as directed 100 each 1    metFORMIN (GLUCOPHAGE) 1000 MG tablet Take 1,000 mg by mouth 2 (two) times daily with meals.      metoprolol tartrate (LOPRESSOR) 25 MG tablet Take 12.5 mg by mouth 2 (two) times daily.      pen needle, diabetic (COMFORT EZ PEN NEEDLES) 31 gauge x 5/16" Ndle Use with insulin injections 100 each 1    REPATHA SURECLICK 140 mg/mL PnIj SMARTSI Milligram(s) SUB-Q Every 2 Weeks       No current facility-administered medications for this visit.     Review of patient's allergies indicates:   Allergen Reactions    Antihistamines - alkylamine     Sulfa (sulfonamide antibiotics)      Facial swelling       Review of Systems   Musculoskeletal:  Positive for arthralgias and joint swelling.   Skin:  Positive for color change and wound.   Neurological:  Positive for numbness.   All other systems reviewed and are negative.    Objective:      Vitals:    23 1552   BP: 117/77   BP Location: Left arm   Patient Position: Sitting   Pulse: 97   Weight: 60.8 kg (134 lb)   Height: 5' (1.524 m)     Physical Exam  Vitals and nursing note reviewed.   Constitutional:       Appearance: Normal appearance.   Cardiovascular:      Pulses:           Dorsalis pedis pulses are 2+ on the right side and 2+ on the left side.        Posterior tibial pulses are 1+ on the right side and 1+ on the left side.   Pulmonary:      Effort: Pulmonary effort is normal.   Musculoskeletal:         General: Swelling and tenderness present.      Left foot: Deformity and bunion present.        Feet:    Feet:      Right foot:      Protective Sensation: 4 sites tested.   1 site sensed.     Left foot:      Protective Sensation: 4 sites tested.   1 site sensed.     Skin integrity: Ulcer, skin breakdown, erythema, warmth and callus present. "   Skin:     Findings: Erythema present.   Neurological:      Mental Status: She is alert.      Sensory: Sensory deficit present.   Psychiatric:         Mood and Affect: Mood normal.         Behavior: Behavior normal.                      Assessment:       1. Ulcer of left foot with fat layer exposed    2. Hallux valgus of left foot    3. Comprehensive diabetic foot examination, type 2 DM, encounter for    4. Type II diabetes mellitus with neurological manifestations          Plan:       Patient presents today follow-up on ulceration left foot secondary to severe bunion deformity with dislocation of the 1st MPJ left.   I did excisionally debride the entire area removing nonviable skin and tissue the deepest area is 1.2 cm long by .5 cm wide I advised the patient the area looks better she is going to have to continue to keep this dry clean dressed every day cleaning the area with Dakin solution.   Patient has dramatic improvement in comparison to her last evaluation which was some come what concerning because it had gotten worse however the patient has improved considerably I did excisionally debride the wound site revealing healthy underlying pink skin and tissue previously noted tracking and heavy serous drainage has resolved at this time.  Patient will clean the area with Dakin solution in the morning applying silver alginate and a well-padded dressing in the evening the patient will clean the area with Dakin solution and apply a tobramycin wet-to-dry dressing she is going to continue alternating this changing the dressings twice a day.  I do plan to follow up with the patient in 2 weeks unless she has any problems questions or concerns I have advised her she is definitely doing a lot better than the last time when we took a couple steps back but now were going in the right direction again and I am hopeful this will continue to improve.  Patient will continue wearing her fracture boot as recommended.  This note was  created using [employer] voice recognition software that occasionally misinterpreted phrases or words.

## 2023-06-22 ENCOUNTER — OFFICE VISIT (OUTPATIENT)
Dept: PODIATRY | Facility: CLINIC | Age: 58
End: 2023-06-22
Payer: COMMERCIAL

## 2023-06-22 VITALS
SYSTOLIC BLOOD PRESSURE: 134 MMHG | WEIGHT: 134 LBS | HEART RATE: 102 BPM | DIASTOLIC BLOOD PRESSURE: 77 MMHG | HEIGHT: 60 IN | BODY MASS INDEX: 26.31 KG/M2

## 2023-06-22 DIAGNOSIS — E11.9 COMPREHENSIVE DIABETIC FOOT EXAMINATION, TYPE 2 DM, ENCOUNTER FOR: ICD-10-CM

## 2023-06-22 DIAGNOSIS — E11.49 TYPE II DIABETES MELLITUS WITH NEUROLOGICAL MANIFESTATIONS: ICD-10-CM

## 2023-06-22 DIAGNOSIS — L97.522 ULCER OF LEFT FOOT WITH FAT LAYER EXPOSED: Primary | ICD-10-CM

## 2023-06-22 DIAGNOSIS — M20.12 HALLUX VALGUS OF LEFT FOOT: ICD-10-CM

## 2023-06-22 PROCEDURE — 3075F PR MOST RECENT SYSTOLIC BLOOD PRESS GE 130-139MM HG: ICD-10-PCS | Mod: CPTII,S$GLB,, | Performed by: PODIATRIST

## 2023-06-22 PROCEDURE — 3078F PR MOST RECENT DIASTOLIC BLOOD PRESSURE < 80 MM HG: ICD-10-PCS | Mod: CPTII,S$GLB,, | Performed by: PODIATRIST

## 2023-06-22 PROCEDURE — 1159F MED LIST DOCD IN RCRD: CPT | Mod: CPTII,S$GLB,, | Performed by: PODIATRIST

## 2023-06-22 PROCEDURE — 99999 PR PBB SHADOW E&M-EST. PATIENT-LVL IV: ICD-10-PCS | Mod: PBBFAC,,, | Performed by: PODIATRIST

## 2023-06-22 PROCEDURE — 3008F PR BODY MASS INDEX (BMI) DOCUMENTED: ICD-10-PCS | Mod: CPTII,S$GLB,, | Performed by: PODIATRIST

## 2023-06-22 PROCEDURE — 3075F SYST BP GE 130 - 139MM HG: CPT | Mod: CPTII,S$GLB,, | Performed by: PODIATRIST

## 2023-06-22 PROCEDURE — 3008F BODY MASS INDEX DOCD: CPT | Mod: CPTII,S$GLB,, | Performed by: PODIATRIST

## 2023-06-22 PROCEDURE — 99999 PR PBB SHADOW E&M-EST. PATIENT-LVL IV: CPT | Mod: PBBFAC,,, | Performed by: PODIATRIST

## 2023-06-22 PROCEDURE — 99214 OFFICE O/P EST MOD 30 MIN: CPT | Mod: S$GLB,,, | Performed by: PODIATRIST

## 2023-06-22 PROCEDURE — 3078F DIAST BP <80 MM HG: CPT | Mod: CPTII,S$GLB,, | Performed by: PODIATRIST

## 2023-06-22 PROCEDURE — 1159F PR MEDICATION LIST DOCUMENTED IN MEDICAL RECORD: ICD-10-PCS | Mod: CPTII,S$GLB,, | Performed by: PODIATRIST

## 2023-06-22 PROCEDURE — 99214 PR OFFICE/OUTPT VISIT, EST, LEVL IV, 30-39 MIN: ICD-10-PCS | Mod: S$GLB,,, | Performed by: PODIATRIST

## 2023-06-25 NOTE — PROGRESS NOTES
Subjective:       Patient ID: No Munoz is a 58 y.o. female.    Chief Complaint: Follow-up  Patient presents for follow-up ulceration left foot.  Past Medical History:   Diagnosis Date    Diabetes mellitus     Diabetes mellitus, type 2     Hyperlipidemia     Hypertension     Myocardial infarction      Past Surgical History:   Procedure Laterality Date    CORONARY ANGIOGRAPHY N/A 7/24/2022    Procedure: Left heart cath;  Surgeon: Rohith Guajardo MD;  Location: [facility] CATH/EP LAB;  Service: Cardiology;  Laterality: N/A;     Family History   Problem Relation Age of Onset    Heart disease Mother     Stroke Mother     Diabetes Mother     Heart disease Father     Diabetes Father      Social History     Socioeconomic History    Marital status: Single   Tobacco Use    Smoking status: Former     Packs/day: 1.00     Types: Cigarettes    Smokeless tobacco: Never    Tobacco comments:     Quit July 2022   Substance and Sexual Activity    Alcohol use: Never    Drug use: Never       Current Outpatient Medications   Medication Sig Dispense Refill    aspirin (ECOTRIN) 81 MG EC tablet Take 81 mg by mouth once daily.      atorvastatin (LIPITOR) 40 MG tablet Take by mouth.      blood sugar diagnostic (TRUE METRIX GLUCOSE TEST STRIP) Strp Use as directed to test blood sugar 3 to 4 times daily 100 each 1    blood-glucose meter (TRUE METRIX GLUCOSE METER) Misc Use to test blood sugar as directed 1 each 0    clopidogreL (PLAVIX) 75 mg tablet Take 75 mg by mouth.      ezetimibe (ZETIA) 10 mg tablet Take 10 mg by mouth.      FREESTYLE ANTHONY 3 SENSOR Naya CHANGE SENSOR EVERY 14 DAYS      insulin aspart U-100 (NOVOLOG FLEXPEN U-100 INSULIN) 100 unit/mL (3 mL) InPn pen Inject 2 Units into the skin 3 (three) times daily with meals. 15 mL 1    insulin degludec (TRESIBA FLEXTOUCH U-100) 100 unit/mL (3 mL) insulin pen Inject 44 Units into the skin once daily.      insulin detemir U-100 (LEVEMIR FLEXTOUCH U-100 INSULN) 100 unit/mL  "(3 mL) InPn pen Inject 8 Units into the skin once daily. 15 mL 1    JARDIANCE 10 mg tablet Take 10 mg by mouth every morning.      lancets 33 gauge Misc Use to test blood sugar 3 to 4 times daily as directed 100 each 1    metFORMIN (GLUCOPHAGE) 1000 MG tablet Take 1,000 mg by mouth 2 (two) times daily with meals.      metoprolol tartrate (LOPRESSOR) 25 MG tablet Take 12.5 mg by mouth 2 (two) times daily.      pen needle, diabetic (COMFORT EZ PEN NEEDLES) 31 gauge x 5/16" Ndle Use with insulin injections 100 each 1    REPATHA SURECLICK 140 mg/mL PnIj SMARTSI Milligram(s) SUB-Q Every 2 Weeks       No current facility-administered medications for this visit.     Review of patient's allergies indicates:   Allergen Reactions    Antihistamines - alkylamine     Sulfa (sulfonamide antibiotics)      Facial swelling       Review of Systems   Musculoskeletal:  Positive for arthralgias and joint swelling.   Skin:  Positive for color change and wound.   Neurological:  Positive for numbness.   All other systems reviewed and are negative.    Objective:      Vitals:    23 1628   BP: 134/77   BP Location: Right arm   Patient Position: Sitting   Pulse: 102   Weight: 60.8 kg (134 lb)   Height: 5' (1.524 m)     Physical Exam  Vitals and nursing note reviewed.   Constitutional:       Appearance: Normal appearance.   Cardiovascular:      Pulses:           Dorsalis pedis pulses are 2+ on the right side and 2+ on the left side.        Posterior tibial pulses are 1+ on the right side and 1+ on the left side.   Pulmonary:      Effort: Pulmonary effort is normal.   Musculoskeletal:         General: Swelling and tenderness present.      Left foot: Deformity and bunion present.        Feet:    Feet:      Right foot:      Protective Sensation: 4 sites tested.   1 site sensed.     Left foot:      Protective Sensation: 4 sites tested.   1 site sensed.     Skin integrity: Ulcer, skin breakdown, erythema, warmth and callus present. "   Skin:     Findings: Erythema present.   Neurological:      Mental Status: She is alert.      Sensory: Sensory deficit present.   Psychiatric:         Mood and Affect: Mood normal.         Behavior: Behavior normal.                            Assessment:       1. Ulcer of left foot with fat layer exposed    2. Type II diabetes mellitus with neurological manifestations    3. Comprehensive diabetic foot examination, type 2 DM, encounter for    4. Hallux valgus of left foot          Plan:       Patient presents today follow-up on ulceration left foot secondary to severe bunion deformity with dislocation of the 1st MPJ left.   I did excisionally debride the entire area removing nonviable skin and tissue the deepest area is 1.2 cm long by .5 cm wide I advised the patient the area looks better she is going to have to continue to keep this dry clean dressed every day cleaning the area with Dakin solution.   Patient has dramatic improvement in comparison to her last evaluation which was some come what concerning because it had gotten worse however the patient has improved considerably I did excisionally debride the wound site revealing healthy underlying pink skin and tissue previously noted tracking and heavy serous drainage has resolved at this time.  Patient will clean the area with Dakin solution in the morning applying silver alginate and a well-padded dressing in the evening the patient will clean the area with Dakin solution and apply a tobramycin wet-to-dry dressing she is going to continue alternating this changing the dressings twice a day.  I do plan to follow up with the patient in 2 weeks unless she has any problems questions or concerns I have advised her she is definitely doing a lot better than the last time when we took a couple steps back but now were going in the right direction again and I am hopeful this will continue to improve.  I am going to allow the patient to transition into a normal shoe with a  lighter dressing but she must monitor these areas closely to ensure it does not cause any additional breakdown or irritation over the ulcer sites which have improved considerably since her initial evaluation.  Patient will continue to keep the area dry and not getting it wet she will alternate silver and Xeroform that covers overlying the Promogran that is applied to the centralized ulceration and she does the tobramycin wet-to-dry bedtime.  Follow-up will be 2 weeks unless the patient has any problems questions or concerns sooner.  This note was created using [employer] voice recognition software that occasionally misinterpreted phrases or words.

## 2023-07-11 ENCOUNTER — OFFICE VISIT (OUTPATIENT)
Dept: PODIATRY | Facility: CLINIC | Age: 58
End: 2023-07-11
Payer: COMMERCIAL

## 2023-07-11 VITALS
DIASTOLIC BLOOD PRESSURE: 69 MMHG | HEART RATE: 90 BPM | SYSTOLIC BLOOD PRESSURE: 101 MMHG | WEIGHT: 134 LBS | HEIGHT: 60 IN | BODY MASS INDEX: 26.31 KG/M2

## 2023-07-11 DIAGNOSIS — E11.49 TYPE II DIABETES MELLITUS WITH NEUROLOGICAL MANIFESTATIONS: ICD-10-CM

## 2023-07-11 DIAGNOSIS — M20.12 HALLUX VALGUS OF LEFT FOOT: ICD-10-CM

## 2023-07-11 DIAGNOSIS — L97.522 ULCER OF LEFT FOOT WITH FAT LAYER EXPOSED: Primary | ICD-10-CM

## 2023-07-11 DIAGNOSIS — E11.9 COMPREHENSIVE DIABETIC FOOT EXAMINATION, TYPE 2 DM, ENCOUNTER FOR: ICD-10-CM

## 2023-07-11 DIAGNOSIS — Z72.0 TOBACCO USE: ICD-10-CM

## 2023-07-11 PROCEDURE — 99999 PR PBB SHADOW E&M-EST. PATIENT-LVL III: CPT | Mod: PBBFAC,,, | Performed by: PODIATRIST

## 2023-07-11 PROCEDURE — 3074F PR MOST RECENT SYSTOLIC BLOOD PRESSURE < 130 MM HG: ICD-10-PCS | Mod: CPTII,S$GLB,, | Performed by: PODIATRIST

## 2023-07-11 PROCEDURE — 3074F SYST BP LT 130 MM HG: CPT | Mod: CPTII,S$GLB,, | Performed by: PODIATRIST

## 2023-07-11 PROCEDURE — 99999 PR PBB SHADOW E&M-EST. PATIENT-LVL III: ICD-10-PCS | Mod: PBBFAC,,, | Performed by: PODIATRIST

## 2023-07-11 PROCEDURE — 99214 PR OFFICE/OUTPT VISIT, EST, LEVL IV, 30-39 MIN: ICD-10-PCS | Mod: S$GLB,,, | Performed by: PODIATRIST

## 2023-07-11 PROCEDURE — 3078F DIAST BP <80 MM HG: CPT | Mod: CPTII,S$GLB,, | Performed by: PODIATRIST

## 2023-07-11 PROCEDURE — 1160F RVW MEDS BY RX/DR IN RCRD: CPT | Mod: CPTII,S$GLB,, | Performed by: PODIATRIST

## 2023-07-11 PROCEDURE — 3078F PR MOST RECENT DIASTOLIC BLOOD PRESSURE < 80 MM HG: ICD-10-PCS | Mod: CPTII,S$GLB,, | Performed by: PODIATRIST

## 2023-07-11 PROCEDURE — 1160F PR REVIEW ALL MEDS BY PRESCRIBER/CLIN PHARMACIST DOCUMENTED: ICD-10-PCS | Mod: CPTII,S$GLB,, | Performed by: PODIATRIST

## 2023-07-11 PROCEDURE — 99214 OFFICE O/P EST MOD 30 MIN: CPT | Mod: S$GLB,,, | Performed by: PODIATRIST

## 2023-07-11 PROCEDURE — 3008F BODY MASS INDEX DOCD: CPT | Mod: CPTII,S$GLB,, | Performed by: PODIATRIST

## 2023-07-11 PROCEDURE — 3008F PR BODY MASS INDEX (BMI) DOCUMENTED: ICD-10-PCS | Mod: CPTII,S$GLB,, | Performed by: PODIATRIST

## 2023-07-11 PROCEDURE — 1159F PR MEDICATION LIST DOCUMENTED IN MEDICAL RECORD: ICD-10-PCS | Mod: CPTII,S$GLB,, | Performed by: PODIATRIST

## 2023-07-11 PROCEDURE — 1159F MED LIST DOCD IN RCRD: CPT | Mod: CPTII,S$GLB,, | Performed by: PODIATRIST

## 2023-07-11 RX ORDER — SEMAGLUTIDE 0.68 MG/ML
0.25 INJECTION, SOLUTION SUBCUTANEOUS
COMMUNITY
Start: 2023-06-15

## 2023-07-15 NOTE — PROGRESS NOTES
Subjective:       Patient ID: No Munoz is a 58 y.o. female.    Chief Complaint: No chief complaint on file.  Patient presents for follow-up ulceration left foot.  Past Medical History:   Diagnosis Date    Diabetes mellitus     Diabetes mellitus, type 2     Hyperlipidemia     Hypertension     Myocardial infarction      Past Surgical History:   Procedure Laterality Date    CORONARY ANGIOGRAPHY N/A 2022    Procedure: Left heart cath;  Surgeon: Rohith Guajardo MD;  Location: [facility] CATH/EP LAB;  Service: Cardiology;  Laterality: N/A;     Family History   Problem Relation Age of Onset    Heart disease Mother     Stroke Mother     Diabetes Mother     Heart disease Father     Diabetes Father      Social History     Socioeconomic History    Marital status: Single   Tobacco Use    Smoking status: Former     Packs/day: 1.00     Types: Cigarettes    Smokeless tobacco: Never    Tobacco comments:     Quit 2022   Substance and Sexual Activity    Alcohol use: Never    Drug use: Never       Current Outpatient Medications   Medication Sig Dispense Refill    aspirin (ECOTRIN) 81 MG EC tablet Take 81 mg by mouth once daily.      atorvastatin (LIPITOR) 40 MG tablet Take by mouth.      clopidogreL (PLAVIX) 75 mg tablet Take 75 mg by mouth.      ezetimibe (ZETIA) 10 mg tablet Take 10 mg by mouth.      JARDIANCE 10 mg tablet Take 10 mg by mouth every morning.      metFORMIN (GLUCOPHAGE) 1000 MG tablet Take 1,000 mg by mouth 2 (two) times daily with meals.      metoprolol tartrate (LOPRESSOR) 25 MG tablet Take 12.5 mg by mouth 2 (two) times daily.      REPATHA SURECLICK 140 mg/mL PnIj SMARTSI Milligram(s) SUB-Q Every 2 Weeks      semaglutide (OZEMPIC) 0.25 mg or 0.5 mg (2 mg/3 mL) pen injector 0.25 mg.       No current facility-administered medications for this visit.     Review of patient's allergies indicates:   Allergen Reactions    Antihistamines - alkylamine     Sulfa (sulfonamide antibiotics)       Facial swelling       Review of Systems   Musculoskeletal:  Positive for arthralgias and joint swelling.   Skin:  Positive for color change and wound.   Neurological:  Positive for numbness.   All other systems reviewed and are negative.    Objective:      Vitals:    07/11/23 1644   BP: 101/69   BP Location: Left arm   Patient Position: Sitting   Pulse: 90   Weight: 60.8 kg (134 lb)   Height: 5' (1.524 m)     Physical Exam  Vitals and nursing note reviewed.   Constitutional:       Appearance: Normal appearance.   Cardiovascular:      Pulses:           Dorsalis pedis pulses are 2+ on the right side and 2+ on the left side.        Posterior tibial pulses are 1+ on the right side and 1+ on the left side.   Pulmonary:      Effort: Pulmonary effort is normal.   Musculoskeletal:         General: Swelling and tenderness present.      Left foot: Deformity and bunion present.        Feet:    Feet:      Right foot:      Protective Sensation: 4 sites tested.   1 site sensed.     Left foot:      Protective Sensation: 4 sites tested.   1 site sensed.     Skin integrity: Ulcer, skin breakdown, erythema, warmth and callus present.   Skin:     Findings: Erythema present.   Neurological:      Mental Status: She is alert.      Sensory: Sensory deficit present.   Psychiatric:         Mood and Affect: Mood normal.         Behavior: Behavior normal.                                      Assessment:       1. Ulcer of left foot with fat layer exposed    2. Type II diabetes mellitus with neurological manifestations    3. Comprehensive diabetic foot examination, type 2 DM, encounter for    4. Tobacco use    5. Hallux valgus of left foot          Plan:       Patient presents today follow-up on ulceration left foot secondary to severe bunion deformity with dislocation of the 1st MPJ left.   I did excisionally debride the entire area removing nonviable skin and tissue the deepest area is 1.0 cm long by .4 cm wide I advised the patient the area  looks better she is going to have to continue to keep this dry clean dressed every day cleaning the area with Dakin solution.   Patient has dramatic improvement in comparison to her last evaluation which was some come what concerning because it had gotten worse however the patient has improved considerably I did excisionally debride the wound site revealing healthy underlying pink skin and tissue previously noted tracking and heavy serous drainage has resolved at this time.  Patient will clean the area with Dakin solution in the morning applying silver alginate and a well-padded dressing in the evening the patient will clean the area with Dakin solution and apply a tobramycin wet-to-dry dressing she is going to continue alternating this changing the dressings daily.  I do plan to follow up with the patient in 2 weeks unless she has any problems questions or concerns I have advised her she is definitely doing a lot better than the last time when we took a couple steps back but now were going in the right direction again and I am hopeful this will continue to improve.  Patient has transitioned into a regular shoe successfully this has not adversely affected the wound site nor the bunion.  Overall depth of the wound has also improved.  I will consider utilizing a horseshoe pad when I follow up with her depending upon the progress of the wound.  Patient will continue to keep the area dry and not getting it wet she will alternate silver and Xeroform that covers overlying the Promogran that is applied to the centralized ulceration and she does the tobramycin wet-to-dry bedtime.  Follow-up will be 2 weeks unless the patient has any problems questions or concerns sooner.  This note was created using [employer] voice recognition software that occasionally misinterpreted phrases or words.

## 2023-08-03 ENCOUNTER — OFFICE VISIT (OUTPATIENT)
Dept: PODIATRY | Facility: CLINIC | Age: 58
End: 2023-08-03
Payer: COMMERCIAL

## 2023-08-03 VITALS
BODY MASS INDEX: 26.31 KG/M2 | HEIGHT: 60 IN | DIASTOLIC BLOOD PRESSURE: 70 MMHG | WEIGHT: 134 LBS | SYSTOLIC BLOOD PRESSURE: 112 MMHG | HEART RATE: 102 BPM

## 2023-08-03 DIAGNOSIS — L97.522 ULCER OF LEFT FOOT WITH FAT LAYER EXPOSED: Primary | ICD-10-CM

## 2023-08-03 DIAGNOSIS — E11.9 COMPREHENSIVE DIABETIC FOOT EXAMINATION, TYPE 2 DM, ENCOUNTER FOR: ICD-10-CM

## 2023-08-03 DIAGNOSIS — E11.49 TYPE II DIABETES MELLITUS WITH NEUROLOGICAL MANIFESTATIONS: ICD-10-CM

## 2023-08-03 DIAGNOSIS — M20.12 HALLUX VALGUS OF LEFT FOOT: ICD-10-CM

## 2023-08-03 PROCEDURE — 1160F RVW MEDS BY RX/DR IN RCRD: CPT | Mod: CPTII,S$GLB,, | Performed by: PODIATRIST

## 2023-08-03 PROCEDURE — 3074F SYST BP LT 130 MM HG: CPT | Mod: CPTII,S$GLB,, | Performed by: PODIATRIST

## 2023-08-03 PROCEDURE — 1160F PR REVIEW ALL MEDS BY PRESCRIBER/CLIN PHARMACIST DOCUMENTED: ICD-10-PCS | Mod: CPTII,S$GLB,, | Performed by: PODIATRIST

## 2023-08-03 PROCEDURE — 3008F PR BODY MASS INDEX (BMI) DOCUMENTED: ICD-10-PCS | Mod: CPTII,S$GLB,, | Performed by: PODIATRIST

## 2023-08-03 PROCEDURE — 99214 PR OFFICE/OUTPT VISIT, EST, LEVL IV, 30-39 MIN: ICD-10-PCS | Mod: S$GLB,,, | Performed by: PODIATRIST

## 2023-08-03 PROCEDURE — 3078F PR MOST RECENT DIASTOLIC BLOOD PRESSURE < 80 MM HG: ICD-10-PCS | Mod: CPTII,S$GLB,, | Performed by: PODIATRIST

## 2023-08-03 PROCEDURE — 1159F PR MEDICATION LIST DOCUMENTED IN MEDICAL RECORD: ICD-10-PCS | Mod: CPTII,S$GLB,, | Performed by: PODIATRIST

## 2023-08-03 PROCEDURE — 1159F MED LIST DOCD IN RCRD: CPT | Mod: CPTII,S$GLB,, | Performed by: PODIATRIST

## 2023-08-03 PROCEDURE — 3078F DIAST BP <80 MM HG: CPT | Mod: CPTII,S$GLB,, | Performed by: PODIATRIST

## 2023-08-03 PROCEDURE — 99999 PR PBB SHADOW E&M-EST. PATIENT-LVL IV: CPT | Mod: PBBFAC,,, | Performed by: PODIATRIST

## 2023-08-03 PROCEDURE — 99214 OFFICE O/P EST MOD 30 MIN: CPT | Mod: S$GLB,,, | Performed by: PODIATRIST

## 2023-08-03 PROCEDURE — 3074F PR MOST RECENT SYSTOLIC BLOOD PRESSURE < 130 MM HG: ICD-10-PCS | Mod: CPTII,S$GLB,, | Performed by: PODIATRIST

## 2023-08-03 PROCEDURE — 3008F BODY MASS INDEX DOCD: CPT | Mod: CPTII,S$GLB,, | Performed by: PODIATRIST

## 2023-08-03 PROCEDURE — 99999 PR PBB SHADOW E&M-EST. PATIENT-LVL IV: ICD-10-PCS | Mod: PBBFAC,,, | Performed by: PODIATRIST

## 2023-08-06 NOTE — PROGRESS NOTES
Subjective:       Patient ID: No Munoz is a 58 y.o. female.    Chief Complaint: Follow-up  Patient presents for follow-up ulceration left foot.  Past Medical History:   Diagnosis Date    Diabetes mellitus     Diabetes mellitus, type 2     Hyperlipidemia     Hypertension     Myocardial infarction      Past Surgical History:   Procedure Laterality Date    CORONARY ANGIOGRAPHY N/A 2022    Procedure: Left heart cath;  Surgeon: Rohith Guajardo MD;  Location: [facility] CATH/EP LAB;  Service: Cardiology;  Laterality: N/A;     Family History   Problem Relation Age of Onset    Heart disease Mother     Stroke Mother     Diabetes Mother     Heart disease Father     Diabetes Father      Social History     Socioeconomic History    Marital status: Single   Tobacco Use    Smoking status: Former     Current packs/day: 1.00     Types: Cigarettes    Smokeless tobacco: Never    Tobacco comments:     Quit 2022   Substance and Sexual Activity    Alcohol use: Never    Drug use: Never       Current Outpatient Medications   Medication Sig Dispense Refill    aspirin (ECOTRIN) 81 MG EC tablet Take 81 mg by mouth once daily.      atorvastatin (LIPITOR) 40 MG tablet Take by mouth.      clopidogreL (PLAVIX) 75 mg tablet Take 75 mg by mouth.      ezetimibe (ZETIA) 10 mg tablet Take 10 mg by mouth.      JARDIANCE 10 mg tablet Take 10 mg by mouth every morning.      metFORMIN (GLUCOPHAGE) 1000 MG tablet Take 1,000 mg by mouth 2 (two) times daily with meals.      metoprolol tartrate (LOPRESSOR) 25 MG tablet Take 12.5 mg by mouth 2 (two) times daily.      REPATHA SURECLICK 140 mg/mL PnIj SMARTSI Milligram(s) SUB-Q Every 2 Weeks      semaglutide (OZEMPIC) 0.25 mg or 0.5 mg (2 mg/3 mL) pen injector 0.25 mg.       No current facility-administered medications for this visit.     Review of patient's allergies indicates:   Allergen Reactions    Antihistamines - alkylamine     Sulfa (sulfonamide antibiotics)      Facial  swelling       Review of Systems   Musculoskeletal:  Positive for arthralgias and joint swelling.   Skin:  Positive for color change and wound.   Neurological:  Positive for numbness.   All other systems reviewed and are negative.      Objective:      Vitals:    08/03/23 1540   BP: 112/70   BP Location: Right arm   Patient Position: Sitting   Pulse: 102   Weight: 60.8 kg (134 lb)   Height: 5' (1.524 m)     Physical Exam  Vitals and nursing note reviewed.   Constitutional:       Appearance: Normal appearance.   Cardiovascular:      Pulses:           Dorsalis pedis pulses are 2+ on the right side and 2+ on the left side.        Posterior tibial pulses are 1+ on the right side and 1+ on the left side.   Pulmonary:      Effort: Pulmonary effort is normal.   Musculoskeletal:         General: Swelling and tenderness present.      Left foot: Deformity and bunion present.        Feet:    Feet:      Right foot:      Protective Sensation: 4 sites tested.   1 site sensed.     Left foot:      Protective Sensation: 4 sites tested.   1 site sensed.     Skin integrity: Ulcer, skin breakdown, erythema, warmth and callus present.   Skin:     Findings: Erythema present.   Neurological:      Mental Status: She is alert.      Sensory: Sensory deficit present.   Psychiatric:         Mood and Affect: Mood normal.         Behavior: Behavior normal.                                                      Assessment:       1. Ulcer of left foot with fat layer exposed    2. Type II diabetes mellitus with neurological manifestations    3. Comprehensive diabetic foot examination, type 2 DM, encounter for    4. Hallux valgus of left foot          Plan:       Patient presents today follow-up on ulceration left foot secondary to severe bunion deformity with dislocation of the 1st MPJ left.   I did excisionally debride the entire area removing nonviable skin and tissue the deepest area is 0.9 cm long by 0.3 cm wide I advised the patient the area  looks better she is going to have to continue to keep this dry clean dressed every day cleaning the area with Dakin solution.   Patient has dramatic improvement in comparison to her last evaluation which was some come what concerning because it had gotten worse however the patient has improved considerably I did non-excisionally debride the wound site revealing healthy underlying pink skin and tissue previously noted tracking and heavy serous drainage has resolved at this time.  Patient is advised we are going to change things up a little bit I am going to have her continue to change the dressing once a day every day she is going to clean with Dakin solution 1 day she will apply silver alginate to the wound site the next day she is going to use Medihoney with calcium alginate we showed the patient how to apply this gave her the Medihoney calcium alginate and advised her if it does look too moist then to skip an application of the Medihoney and just use the silver alginate.  Patient was made aware I am trying to find the perfect combination of not too moist not too dry for optimal wound healing.  Total face-to-face time including discussion evaluation treatment discussion of treatment options treatment plan non excisional debridement and wound care equaled 30 minutes.  I will consider utilizing a horseshoe pad when I follow up with her depending upon the progress of the wound.   Follow-up will be 2 weeks unless the patient has any problems questions or concerns sooner.  This note was created using [employer] voice recognition software that occasionally misinterpreted phrases or words.

## 2023-08-17 ENCOUNTER — OFFICE VISIT (OUTPATIENT)
Dept: PODIATRY | Facility: CLINIC | Age: 58
End: 2023-08-17
Payer: COMMERCIAL

## 2023-08-17 VITALS
BODY MASS INDEX: 26.31 KG/M2 | HEART RATE: 107 BPM | WEIGHT: 134 LBS | SYSTOLIC BLOOD PRESSURE: 111 MMHG | DIASTOLIC BLOOD PRESSURE: 63 MMHG | HEIGHT: 60 IN

## 2023-08-17 DIAGNOSIS — E11.9 COMPREHENSIVE DIABETIC FOOT EXAMINATION, TYPE 2 DM, ENCOUNTER FOR: ICD-10-CM

## 2023-08-17 DIAGNOSIS — L97.522 ULCER OF LEFT FOOT WITH FAT LAYER EXPOSED: Primary | ICD-10-CM

## 2023-08-17 DIAGNOSIS — E11.49 TYPE II DIABETES MELLITUS WITH NEUROLOGICAL MANIFESTATIONS: ICD-10-CM

## 2023-08-17 PROCEDURE — 1160F RVW MEDS BY RX/DR IN RCRD: CPT | Mod: CPTII,S$GLB,, | Performed by: PODIATRIST

## 2023-08-17 PROCEDURE — 3078F DIAST BP <80 MM HG: CPT | Mod: CPTII,S$GLB,, | Performed by: PODIATRIST

## 2023-08-17 PROCEDURE — 1160F PR REVIEW ALL MEDS BY PRESCRIBER/CLIN PHARMACIST DOCUMENTED: ICD-10-PCS | Mod: CPTII,S$GLB,, | Performed by: PODIATRIST

## 2023-08-17 PROCEDURE — 3074F SYST BP LT 130 MM HG: CPT | Mod: CPTII,S$GLB,, | Performed by: PODIATRIST

## 2023-08-17 PROCEDURE — 3078F PR MOST RECENT DIASTOLIC BLOOD PRESSURE < 80 MM HG: ICD-10-PCS | Mod: CPTII,S$GLB,, | Performed by: PODIATRIST

## 2023-08-17 PROCEDURE — 1159F PR MEDICATION LIST DOCUMENTED IN MEDICAL RECORD: ICD-10-PCS | Mod: CPTII,S$GLB,, | Performed by: PODIATRIST

## 2023-08-17 PROCEDURE — 3008F PR BODY MASS INDEX (BMI) DOCUMENTED: ICD-10-PCS | Mod: CPTII,S$GLB,, | Performed by: PODIATRIST

## 2023-08-17 PROCEDURE — 99999 PR PBB SHADOW E&M-EST. PATIENT-LVL III: CPT | Mod: PBBFAC,,, | Performed by: PODIATRIST

## 2023-08-17 PROCEDURE — 99214 OFFICE O/P EST MOD 30 MIN: CPT | Mod: S$GLB,,, | Performed by: PODIATRIST

## 2023-08-17 PROCEDURE — 1159F MED LIST DOCD IN RCRD: CPT | Mod: CPTII,S$GLB,, | Performed by: PODIATRIST

## 2023-08-17 PROCEDURE — 99214 PR OFFICE/OUTPT VISIT, EST, LEVL IV, 30-39 MIN: ICD-10-PCS | Mod: S$GLB,,, | Performed by: PODIATRIST

## 2023-08-17 PROCEDURE — 3008F BODY MASS INDEX DOCD: CPT | Mod: CPTII,S$GLB,, | Performed by: PODIATRIST

## 2023-08-17 PROCEDURE — 99999 PR PBB SHADOW E&M-EST. PATIENT-LVL III: ICD-10-PCS | Mod: PBBFAC,,, | Performed by: PODIATRIST

## 2023-08-17 PROCEDURE — 3074F PR MOST RECENT SYSTOLIC BLOOD PRESSURE < 130 MM HG: ICD-10-PCS | Mod: CPTII,S$GLB,, | Performed by: PODIATRIST

## 2023-08-20 NOTE — PROGRESS NOTES
Subjective:       Patient ID: No Munoz is a 58 y.o. female.    Chief Complaint: Follow-up and Foot Ulcer  Patient presents for follow-up ulceration left foot.  Past Medical History:   Diagnosis Date    Diabetes mellitus     Diabetes mellitus, type 2     Hyperlipidemia     Hypertension     Myocardial infarction      Past Surgical History:   Procedure Laterality Date    CORONARY ANGIOGRAPHY N/A 2022    Procedure: Left heart cath;  Surgeon: Rohith Guajardo MD;  Location: [facility] CATH/EP LAB;  Service: Cardiology;  Laterality: N/A;     Family History   Problem Relation Age of Onset    Heart disease Mother     Stroke Mother     Diabetes Mother     Heart disease Father     Diabetes Father      Social History     Socioeconomic History    Marital status: Single   Tobacco Use    Smoking status: Former     Current packs/day: 1.00     Types: Cigarettes    Smokeless tobacco: Never    Tobacco comments:     Quit 2022   Substance and Sexual Activity    Alcohol use: Never    Drug use: Never       Current Outpatient Medications   Medication Sig Dispense Refill    aspirin (ECOTRIN) 81 MG EC tablet Take 81 mg by mouth once daily.      atorvastatin (LIPITOR) 40 MG tablet Take by mouth.      clopidogreL (PLAVIX) 75 mg tablet Take 75 mg by mouth.      ezetimibe (ZETIA) 10 mg tablet Take 10 mg by mouth.      JARDIANCE 10 mg tablet Take 10 mg by mouth every morning.      metFORMIN (GLUCOPHAGE) 1000 MG tablet Take 1,000 mg by mouth 2 (two) times daily with meals.      metoprolol tartrate (LOPRESSOR) 25 MG tablet Take 12.5 mg by mouth 2 (two) times daily.      REPATHA SURECLICK 140 mg/mL PnIj SMARTSI Milligram(s) SUB-Q Every 2 Weeks      semaglutide (OZEMPIC) 0.25 mg or 0.5 mg (2 mg/3 mL) pen injector 0.25 mg.       No current facility-administered medications for this visit.     Review of patient's allergies indicates:   Allergen Reactions    Antihistamines - alkylamine     Sulfa (sulfonamide antibiotics)       Facial swelling       Review of Systems   Musculoskeletal:  Positive for arthralgias and joint swelling.   Skin:  Positive for color change and wound.   Neurological:  Positive for numbness.   All other systems reviewed and are negative.      Objective:      Vitals:    08/17/23 1556   BP: 111/63   BP Location: Right arm   Patient Position: Sitting   Pulse: 107   Weight: 60.8 kg (134 lb)   Height: 5' (1.524 m)     Physical Exam  Vitals and nursing note reviewed.   Constitutional:       Appearance: Normal appearance.   Cardiovascular:      Pulses:           Dorsalis pedis pulses are 2+ on the right side and 2+ on the left side.        Posterior tibial pulses are 1+ on the right side and 1+ on the left side.   Pulmonary:      Effort: Pulmonary effort is normal.   Musculoskeletal:         General: Swelling and tenderness present.      Left foot: Deformity and bunion present.        Feet:    Feet:      Right foot:      Protective Sensation: 4 sites tested.   1 site sensed.     Left foot:      Protective Sensation: 4 sites tested.   1 site sensed.     Skin integrity: Ulcer, skin breakdown, erythema, warmth and callus present.   Skin:     Findings: Erythema present.   Neurological:      Mental Status: She is alert.      Sensory: Sensory deficit present.   Psychiatric:         Mood and Affect: Mood normal.         Behavior: Behavior normal.                                                                    Assessment:       1. Ulcer of left foot with fat layer exposed    2. Type II diabetes mellitus with neurological manifestations    3. Comprehensive diabetic foot examination, type 2 DM, encounter for          Plan:       Patient presents today follow-up on ulceration left foot secondary to severe bunion deformity with dislocation of the 1st MPJ left.   I did excisionally debride the entire area removing nonviable skin and tissue the deepest area is 0.8 cm long by 0.3 cm wide I advised the patient the area looks better  she is going to have to continue to keep this dry clean dressed every day cleaning the area with Dakin solution.   Patient has limited improvement in comparison to her last evaluation which was some what concerning because it had gotten worse however the patient has improved. I did non-excisionally debride the wound site revealing healthy underlying pink skin and tissue.  p Patient is advised we are going to change things up a little bit I am going to have her continue to change the dressing once a day every day she is going to clean with Dakin solution she is going to use Medihoney gel.   We showed the patient how to apply this gave her the Medihoney gel and advised her if it does look too moist then to skip an application of the Medihoney and just use the silver alginate.  Patient is going to cover the entire area with Xeroform every day the area has dried out quite a bit it had been perfect as far as moisture goes for healing but it is become drier because we have not been using the Xeroform daily were going to go back to using the Xeroform daily as directed.  Patient was made aware I am trying to find the perfect combination of not too moist not too dry for optimal wound healing.  Total face-to-face time including discussion evaluation treatment discussion of treatment options treatment plan non excisional debridement and wound care equaled 30 minutes.  I will consider utilizing a horseshoe pad when I follow up with her depending upon the progress of the wound.   Follow-up will be 2 weeks unless the patient has any problems questions or concerns sooner.  This note was created using [employer] voice recognition software that occasionally misinterpreted phrases or words.

## 2023-08-31 ENCOUNTER — OFFICE VISIT (OUTPATIENT)
Dept: PODIATRY | Facility: CLINIC | Age: 58
End: 2023-08-31
Payer: COMMERCIAL

## 2023-08-31 VITALS
DIASTOLIC BLOOD PRESSURE: 63 MMHG | SYSTOLIC BLOOD PRESSURE: 127 MMHG | HEIGHT: 60 IN | BODY MASS INDEX: 26.31 KG/M2 | HEART RATE: 98 BPM | WEIGHT: 134 LBS

## 2023-08-31 DIAGNOSIS — E11.49 TYPE II DIABETES MELLITUS WITH NEUROLOGICAL MANIFESTATIONS: ICD-10-CM

## 2023-08-31 DIAGNOSIS — E11.9 COMPREHENSIVE DIABETIC FOOT EXAMINATION, TYPE 2 DM, ENCOUNTER FOR: ICD-10-CM

## 2023-08-31 DIAGNOSIS — M20.12 HALLUX VALGUS OF LEFT FOOT: ICD-10-CM

## 2023-08-31 DIAGNOSIS — L97.522 ULCER OF LEFT FOOT WITH FAT LAYER EXPOSED: Primary | ICD-10-CM

## 2023-08-31 PROCEDURE — 1159F MED LIST DOCD IN RCRD: CPT | Mod: CPTII,S$GLB,, | Performed by: PODIATRIST

## 2023-08-31 PROCEDURE — 3074F PR MOST RECENT SYSTOLIC BLOOD PRESSURE < 130 MM HG: ICD-10-PCS | Mod: CPTII,S$GLB,, | Performed by: PODIATRIST

## 2023-08-31 PROCEDURE — 3008F PR BODY MASS INDEX (BMI) DOCUMENTED: ICD-10-PCS | Mod: CPTII,S$GLB,, | Performed by: PODIATRIST

## 2023-08-31 PROCEDURE — 3008F BODY MASS INDEX DOCD: CPT | Mod: CPTII,S$GLB,, | Performed by: PODIATRIST

## 2023-08-31 PROCEDURE — 3074F SYST BP LT 130 MM HG: CPT | Mod: CPTII,S$GLB,, | Performed by: PODIATRIST

## 2023-08-31 PROCEDURE — 1160F RVW MEDS BY RX/DR IN RCRD: CPT | Mod: CPTII,S$GLB,, | Performed by: PODIATRIST

## 2023-08-31 PROCEDURE — 3078F DIAST BP <80 MM HG: CPT | Mod: CPTII,S$GLB,, | Performed by: PODIATRIST

## 2023-08-31 PROCEDURE — 1159F PR MEDICATION LIST DOCUMENTED IN MEDICAL RECORD: ICD-10-PCS | Mod: CPTII,S$GLB,, | Performed by: PODIATRIST

## 2023-08-31 PROCEDURE — 3078F PR MOST RECENT DIASTOLIC BLOOD PRESSURE < 80 MM HG: ICD-10-PCS | Mod: CPTII,S$GLB,, | Performed by: PODIATRIST

## 2023-08-31 PROCEDURE — 99214 OFFICE O/P EST MOD 30 MIN: CPT | Mod: S$GLB,,, | Performed by: PODIATRIST

## 2023-08-31 PROCEDURE — 99214 PR OFFICE/OUTPT VISIT, EST, LEVL IV, 30-39 MIN: ICD-10-PCS | Mod: S$GLB,,, | Performed by: PODIATRIST

## 2023-08-31 PROCEDURE — 99999 PR PBB SHADOW E&M-EST. PATIENT-LVL IV: CPT | Mod: PBBFAC,,, | Performed by: PODIATRIST

## 2023-08-31 PROCEDURE — 99999 PR PBB SHADOW E&M-EST. PATIENT-LVL IV: ICD-10-PCS | Mod: PBBFAC,,, | Performed by: PODIATRIST

## 2023-08-31 PROCEDURE — 1160F PR REVIEW ALL MEDS BY PRESCRIBER/CLIN PHARMACIST DOCUMENTED: ICD-10-PCS | Mod: CPTII,S$GLB,, | Performed by: PODIATRIST

## 2023-09-04 NOTE — PROGRESS NOTES
Subjective:       Patient ID: No Munoz is a 58 y.o. female.    Chief Complaint: Foot Ulcer and Foot Pain  Patient presents for follow-up ulceration left foot.  Past Medical History:   Diagnosis Date    Diabetes mellitus     Diabetes mellitus, type 2     Hyperlipidemia     Hypertension     Myocardial infarction      Past Surgical History:   Procedure Laterality Date    CORONARY ANGIOGRAPHY N/A 2022    Procedure: Left heart cath;  Surgeon: Rohith Guajardo MD;  Location: [facility] CATH/EP LAB;  Service: Cardiology;  Laterality: N/A;     Family History   Problem Relation Age of Onset    Heart disease Mother     Stroke Mother     Diabetes Mother     Heart disease Father     Diabetes Father      Social History     Socioeconomic History    Marital status: Single   Tobacco Use    Smoking status: Former     Current packs/day: 1.00     Types: Cigarettes    Smokeless tobacco: Never    Tobacco comments:     Quit 2022   Substance and Sexual Activity    Alcohol use: Never    Drug use: Never       Current Outpatient Medications   Medication Sig Dispense Refill    aspirin (ECOTRIN) 81 MG EC tablet Take 81 mg by mouth once daily.      atorvastatin (LIPITOR) 40 MG tablet Take by mouth.      clopidogreL (PLAVIX) 75 mg tablet Take 75 mg by mouth.      ezetimibe (ZETIA) 10 mg tablet Take 10 mg by mouth.      JARDIANCE 10 mg tablet Take 10 mg by mouth every morning.      metFORMIN (GLUCOPHAGE) 1000 MG tablet Take 1,000 mg by mouth 2 (two) times daily with meals.      metoprolol tartrate (LOPRESSOR) 25 MG tablet Take 12.5 mg by mouth 2 (two) times daily.      REPATHA SURECLICK 140 mg/mL PnIj SMARTSI Milligram(s) SUB-Q Every 2 Weeks      semaglutide (OZEMPIC) 0.25 mg or 0.5 mg (2 mg/3 mL) pen injector 0.25 mg.       No current facility-administered medications for this visit.     Review of patient's allergies indicates:   Allergen Reactions    Antihistamines - alkylamine     Sulfa (sulfonamide antibiotics)       Facial swelling       Review of Systems   Musculoskeletal:  Positive for arthralgias and joint swelling.   Skin:  Positive for color change and wound.   Neurological:  Positive for numbness.   All other systems reviewed and are negative.      Objective:      Vitals:    08/31/23 1603   BP: 127/63   BP Location: Right arm   Patient Position: Sitting   Pulse: 98   Weight: 60.8 kg (134 lb)   Height: 5' (1.524 m)     Physical Exam  Vitals and nursing note reviewed.   Constitutional:       Appearance: Normal appearance.   Cardiovascular:      Pulses:           Dorsalis pedis pulses are 2+ on the right side and 2+ on the left side.        Posterior tibial pulses are 1+ on the right side and 1+ on the left side.   Pulmonary:      Effort: Pulmonary effort is normal.   Musculoskeletal:         General: Swelling and tenderness present.      Left foot: Deformity and bunion present.        Feet:    Feet:      Right foot:      Protective Sensation: 4 sites tested.   1 site sensed.     Left foot:      Protective Sensation: 4 sites tested.   1 site sensed.     Skin integrity: Ulcer, skin breakdown, erythema, warmth and callus present.   Skin:     Findings: Erythema present.   Neurological:      Mental Status: She is alert.      Sensory: Sensory deficit present.   Psychiatric:         Mood and Affect: Mood normal.         Behavior: Behavior normal.                                                                              Assessment:       1. Ulcer of left foot with fat layer exposed    2. Type II diabetes mellitus with neurological manifestations    3. Comprehensive diabetic foot examination, type 2 DM, encounter for    4. Hallux valgus of left foot          Plan:       Patient presents today follow-up on ulceration left foot secondary to severe bunion deformity with dislocation of the 1st MPJ left.   I did excisionally debride the entire area removing nonviable skin and tissue the deepest area is 0.8 cm long by 0.3 cm wide I  advised the patient the area looks better she is going to have to continue to keep this dry clean dressed every day cleaning the area with Dakin solution.   Patient has limited improvement in comparison to her last evaluation which was some what concerning because it had gotten worse however the patient has improved. I did non-excisionally debride the wound site revealing healthy underlying pink skin and tissue.  p Patient is advised we are going to change things up a little bit I am going to have her continue to change the dressing once a day every day she is going to clean with Dakin solution she is going to use Medihoney gel I am also going to have the patient start to apply and adhesive felt horseshoe pad directly to the skin to further offload pressure from the area of ulceration it really has not changed in size it is healthy in appearance it has good granular tissue but it has not gotten smaller.  Patient was dispensed some horseshoe pads made out of adhesive felt I am going to try to find her additional horseshoe pads because most of them are made out of a foam I would much prefer the felt and I did advise her to be careful removing the pad when she changes it so she does not cause a skin tear.  I am also going to look into possibly some 16th inch donut pads that can be fabricated.   We showed the patient how to apply this gave her the Medihoney gel and advised her if it does look too moist then to skip an application of the Medihoney and just use the silver alginate.  Patient is going to cover the entire area with Xeroform every day the area has dried out quite a bit it had been perfect as far as moisture goes for healing but it is become drier because we have not been using the Xeroform daily were going to go back to using the Xeroform daily as directed.  Patient was made aware I am trying to find the perfect combination of not too moist not too dry for optimal wound healing.  Total face-to-face time  including discussion evaluation treatment discussion of treatment options treatment plan non excisional debridement and wound care equaled 30 minutes.  I will consider utilizing a horseshoe pad when I follow up with her depending upon the progress of the wound.   Follow-up will be 2 weeks unless the patient has any problems questions or concerns sooner.  This note was created using [employer] voice recognition software that occasionally misinterpreted phrases or words.

## 2023-09-06 ENCOUNTER — TELEPHONE (OUTPATIENT)
Dept: PODIATRY | Facility: CLINIC | Age: 58
End: 2023-09-06
Payer: COMMERCIAL

## 2023-09-06 NOTE — TELEPHONE ENCOUNTER
----- Message from Everardo Sweeney DPM sent at 9/6/2023  7:57 AM CDT -----  Please call the patient and advise I had found some more of the adhesive felt horseshoe pads I have also got some additional adhesive felt material if she does not have enough of these pads to make it until her next appointment I will have them in bianca head for her to  otherwise she can just wait until her follow-up appointment.  Thank you

## 2023-09-14 ENCOUNTER — OFFICE VISIT (OUTPATIENT)
Dept: PODIATRY | Facility: CLINIC | Age: 58
End: 2023-09-14
Payer: COMMERCIAL

## 2023-09-14 VITALS
BODY MASS INDEX: 26.31 KG/M2 | WEIGHT: 134 LBS | HEIGHT: 60 IN | DIASTOLIC BLOOD PRESSURE: 75 MMHG | SYSTOLIC BLOOD PRESSURE: 119 MMHG | HEART RATE: 96 BPM

## 2023-09-14 DIAGNOSIS — E11.9 COMPREHENSIVE DIABETIC FOOT EXAMINATION, TYPE 2 DM, ENCOUNTER FOR: ICD-10-CM

## 2023-09-14 DIAGNOSIS — E11.49 TYPE II DIABETES MELLITUS WITH NEUROLOGICAL MANIFESTATIONS: ICD-10-CM

## 2023-09-14 DIAGNOSIS — L97.522 ULCER OF LEFT FOOT WITH FAT LAYER EXPOSED: Primary | ICD-10-CM

## 2023-09-14 PROCEDURE — 1159F PR MEDICATION LIST DOCUMENTED IN MEDICAL RECORD: ICD-10-PCS | Mod: CPTII,S$GLB,, | Performed by: PODIATRIST

## 2023-09-14 PROCEDURE — 99214 OFFICE O/P EST MOD 30 MIN: CPT | Mod: S$GLB,,, | Performed by: PODIATRIST

## 2023-09-14 PROCEDURE — 3078F PR MOST RECENT DIASTOLIC BLOOD PRESSURE < 80 MM HG: ICD-10-PCS | Mod: CPTII,S$GLB,, | Performed by: PODIATRIST

## 2023-09-14 PROCEDURE — 99214 PR OFFICE/OUTPT VISIT, EST, LEVL IV, 30-39 MIN: ICD-10-PCS | Mod: S$GLB,,, | Performed by: PODIATRIST

## 2023-09-14 PROCEDURE — 3074F SYST BP LT 130 MM HG: CPT | Mod: CPTII,S$GLB,, | Performed by: PODIATRIST

## 2023-09-14 PROCEDURE — 87070 CULTURE OTHR SPECIMN AEROBIC: CPT | Performed by: PODIATRIST

## 2023-09-14 PROCEDURE — 3008F PR BODY MASS INDEX (BMI) DOCUMENTED: ICD-10-PCS | Mod: CPTII,S$GLB,, | Performed by: PODIATRIST

## 2023-09-14 PROCEDURE — 99999 PR PBB SHADOW E&M-EST. PATIENT-LVL IV: CPT | Mod: PBBFAC,,, | Performed by: PODIATRIST

## 2023-09-14 PROCEDURE — 99999 PR PBB SHADOW E&M-EST. PATIENT-LVL IV: ICD-10-PCS | Mod: PBBFAC,,, | Performed by: PODIATRIST

## 2023-09-14 PROCEDURE — 3078F DIAST BP <80 MM HG: CPT | Mod: CPTII,S$GLB,, | Performed by: PODIATRIST

## 2023-09-14 PROCEDURE — 3008F BODY MASS INDEX DOCD: CPT | Mod: CPTII,S$GLB,, | Performed by: PODIATRIST

## 2023-09-14 PROCEDURE — 1160F PR REVIEW ALL MEDS BY PRESCRIBER/CLIN PHARMACIST DOCUMENTED: ICD-10-PCS | Mod: CPTII,S$GLB,, | Performed by: PODIATRIST

## 2023-09-14 PROCEDURE — 1160F RVW MEDS BY RX/DR IN RCRD: CPT | Mod: CPTII,S$GLB,, | Performed by: PODIATRIST

## 2023-09-14 PROCEDURE — 3074F PR MOST RECENT SYSTOLIC BLOOD PRESSURE < 130 MM HG: ICD-10-PCS | Mod: CPTII,S$GLB,, | Performed by: PODIATRIST

## 2023-09-14 PROCEDURE — 1159F MED LIST DOCD IN RCRD: CPT | Mod: CPTII,S$GLB,, | Performed by: PODIATRIST

## 2023-09-14 PROCEDURE — 87106 FUNGI IDENTIFICATION YEAST: CPT | Performed by: PODIATRIST

## 2023-09-17 NOTE — PROGRESS NOTES
Subjective:       Patient ID: No Munoz is a 58 y.o. female.    Chief Complaint: Foot Ulcer and Follow-up  Patient presents for follow-up ulceration left foot.  Past Medical History:   Diagnosis Date    Diabetes mellitus     Diabetes mellitus, type 2     Hyperlipidemia     Hypertension     Myocardial infarction      Past Surgical History:   Procedure Laterality Date    CORONARY ANGIOGRAPHY N/A 2022    Procedure: Left heart cath;  Surgeon: Rohith Guajardo MD;  Location: [facility] CATH/EP LAB;  Service: Cardiology;  Laterality: N/A;     Family History   Problem Relation Age of Onset    Heart disease Mother     Stroke Mother     Diabetes Mother     Heart disease Father     Diabetes Father      Social History     Socioeconomic History    Marital status: Single   Tobacco Use    Smoking status: Former     Current packs/day: 1.00     Types: Cigarettes    Smokeless tobacco: Never    Tobacco comments:     Quit 2022   Substance and Sexual Activity    Alcohol use: Never    Drug use: Never       Current Outpatient Medications   Medication Sig Dispense Refill    aspirin (ECOTRIN) 81 MG EC tablet Take 81 mg by mouth once daily.      atorvastatin (LIPITOR) 40 MG tablet Take by mouth.      clopidogreL (PLAVIX) 75 mg tablet Take 75 mg by mouth.      ezetimibe (ZETIA) 10 mg tablet Take 10 mg by mouth.      JARDIANCE 10 mg tablet Take 10 mg by mouth every morning.      metFORMIN (GLUCOPHAGE) 1000 MG tablet Take 1,000 mg by mouth 2 (two) times daily with meals.      metoprolol tartrate (LOPRESSOR) 25 MG tablet Take 12.5 mg by mouth 2 (two) times daily.      REPATHA SURECLICK 140 mg/mL PnIj SMARTSI Milligram(s) SUB-Q Every 2 Weeks      semaglutide (OZEMPIC) 0.25 mg or 0.5 mg (2 mg/3 mL) pen injector 0.25 mg.       No current facility-administered medications for this visit.     Review of patient's allergies indicates:   Allergen Reactions    Antihistamines - alkylamine     Sulfa (sulfonamide antibiotics)       Facial swelling       Review of Systems   Musculoskeletal:  Positive for arthralgias and joint swelling.   Skin:  Positive for color change and wound.   Neurological:  Positive for numbness.   All other systems reviewed and are negative.      Objective:      Vitals:    09/14/23 1610   BP: 119/75   BP Location: Right arm   Patient Position: Sitting   Pulse: 96   Weight: 60.8 kg (134 lb)   Height: 5' (1.524 m)     Physical Exam  Vitals and nursing note reviewed.   Constitutional:       Appearance: Normal appearance.   Cardiovascular:      Pulses:           Dorsalis pedis pulses are 2+ on the right side and 2+ on the left side.        Posterior tibial pulses are 1+ on the right side and 1+ on the left side.   Pulmonary:      Effort: Pulmonary effort is normal.   Musculoskeletal:         General: Swelling and tenderness present.      Left foot: Deformity and bunion present.        Feet:    Feet:      Right foot:      Protective Sensation: 4 sites tested.   1 site sensed.     Left foot:      Protective Sensation: 4 sites tested.   1 site sensed.     Skin integrity: Ulcer, skin breakdown, erythema, warmth and callus present.   Skin:     Findings: Erythema present.   Neurological:      Mental Status: She is alert.      Sensory: Sensory deficit present.   Psychiatric:         Mood and Affect: Mood normal.         Behavior: Behavior normal.                                                                                        Assessment:       1. Ulcer of left foot with fat layer exposed    2. Type II diabetes mellitus with neurological manifestations    3. Comprehensive diabetic foot examination, type 2 DM, encounter for          Plan:       Patient presents today follow-up on ulceration left foot secondary to severe bunion deformity with dislocation of the 1st MPJ left.   Patient states that she developed a blister that ultimately broke open in and around the ulceration site this drained a pink fluid from the area.   Patient's ulceration has become larger it is currently 2 cm long by 5 mm wide by 3 mm deep there is now kind of a pocket that has formed on the plantar medial aspect of the left hallux I did perform a culture and sensitivity of the area patient will be placed on antibiotics pending culture results.  I did advised the patient clearly she had a reaction to the adhesive on the pad that we were using to offload pressure from the area while the ulcer looks significantly worse I assured the patient it is not significantly worse it is not deeper and that it is a setback in treatment however I want her to clean the area with Dakin solution apply a thin layer of silver alginate and tucked into the wound site covering the area with Xeroform to keep it well hydrated because it the area does dry out it breaks down.  Patient will be contacted when we have the results of her culture and sensitivity I do want see her in 1 week for follow-up because of the increasing size of the wound she is going to need to continue to change this daily and pad the area very well.  Patient will discontinue Medihoney to the area until further notice.  I did dispense the patient a new soft accommodative insole for her shoe the insole that she was using his very broken down and she is getting a lot of pressure sub 1st MPJ left.  I am hopeful the softer padding will help to further offload pressure from the area patient is advised to contact us with any problems questions or concerns prior to scheduled follow-up.  The debridement that was performed on the area was non excisional.  Face-to-face time including discussion evaluation treatment discussion of treatment options treatment plan and wound care including non excisional debridement equaled 30 minutes.  This note was created using [employer] voice recognition software that occasionally misinterpreted phrases or words.

## 2023-09-18 LAB — BACTERIA SPEC AEROBE CULT: ABNORMAL

## 2023-09-19 ENCOUNTER — TELEPHONE (OUTPATIENT)
Dept: PODIATRY | Facility: CLINIC | Age: 58
End: 2023-09-19
Payer: COMMERCIAL

## 2023-09-21 ENCOUNTER — OFFICE VISIT (OUTPATIENT)
Dept: PODIATRY | Facility: CLINIC | Age: 58
End: 2023-09-21
Payer: COMMERCIAL

## 2023-09-21 VITALS
HEIGHT: 60 IN | SYSTOLIC BLOOD PRESSURE: 118 MMHG | WEIGHT: 134 LBS | BODY MASS INDEX: 26.31 KG/M2 | HEART RATE: 101 BPM | DIASTOLIC BLOOD PRESSURE: 73 MMHG

## 2023-09-21 DIAGNOSIS — M20.12 HALLUX VALGUS OF LEFT FOOT: ICD-10-CM

## 2023-09-21 DIAGNOSIS — L97.522 ULCER OF LEFT FOOT WITH FAT LAYER EXPOSED: Primary | ICD-10-CM

## 2023-09-21 DIAGNOSIS — E11.9 COMPREHENSIVE DIABETIC FOOT EXAMINATION, TYPE 2 DM, ENCOUNTER FOR: ICD-10-CM

## 2023-09-21 DIAGNOSIS — E11.49 TYPE II DIABETES MELLITUS WITH NEUROLOGICAL MANIFESTATIONS: ICD-10-CM

## 2023-09-21 PROCEDURE — 99213 PR OFFICE/OUTPT VISIT, EST, LEVL III, 20-29 MIN: ICD-10-PCS | Mod: S$GLB,,, | Performed by: PODIATRIST

## 2023-09-21 PROCEDURE — 99999 PR PBB SHADOW E&M-EST. PATIENT-LVL III: CPT | Mod: PBBFAC,,, | Performed by: PODIATRIST

## 2023-09-21 PROCEDURE — 3008F BODY MASS INDEX DOCD: CPT | Mod: CPTII,S$GLB,, | Performed by: PODIATRIST

## 2023-09-21 PROCEDURE — 99213 OFFICE O/P EST LOW 20 MIN: CPT | Mod: S$GLB,,, | Performed by: PODIATRIST

## 2023-09-21 PROCEDURE — 3074F PR MOST RECENT SYSTOLIC BLOOD PRESSURE < 130 MM HG: ICD-10-PCS | Mod: CPTII,S$GLB,, | Performed by: PODIATRIST

## 2023-09-21 PROCEDURE — 1160F RVW MEDS BY RX/DR IN RCRD: CPT | Mod: CPTII,S$GLB,, | Performed by: PODIATRIST

## 2023-09-21 PROCEDURE — 1160F PR REVIEW ALL MEDS BY PRESCRIBER/CLIN PHARMACIST DOCUMENTED: ICD-10-PCS | Mod: CPTII,S$GLB,, | Performed by: PODIATRIST

## 2023-09-21 PROCEDURE — 99999 PR PBB SHADOW E&M-EST. PATIENT-LVL III: ICD-10-PCS | Mod: PBBFAC,,, | Performed by: PODIATRIST

## 2023-09-21 PROCEDURE — 1159F MED LIST DOCD IN RCRD: CPT | Mod: CPTII,S$GLB,, | Performed by: PODIATRIST

## 2023-09-21 PROCEDURE — 3008F PR BODY MASS INDEX (BMI) DOCUMENTED: ICD-10-PCS | Mod: CPTII,S$GLB,, | Performed by: PODIATRIST

## 2023-09-21 PROCEDURE — 1159F PR MEDICATION LIST DOCUMENTED IN MEDICAL RECORD: ICD-10-PCS | Mod: CPTII,S$GLB,, | Performed by: PODIATRIST

## 2023-09-21 PROCEDURE — 3078F PR MOST RECENT DIASTOLIC BLOOD PRESSURE < 80 MM HG: ICD-10-PCS | Mod: CPTII,S$GLB,, | Performed by: PODIATRIST

## 2023-09-21 PROCEDURE — 3078F DIAST BP <80 MM HG: CPT | Mod: CPTII,S$GLB,, | Performed by: PODIATRIST

## 2023-09-21 PROCEDURE — 3074F SYST BP LT 130 MM HG: CPT | Mod: CPTII,S$GLB,, | Performed by: PODIATRIST

## 2023-09-24 NOTE — PROGRESS NOTES
Subjective:       Patient ID: No Munoz is a 58 y.o. female.    Chief Complaint: Follow-up and Foot Ulcer  Patient presents for follow-up ulceration left foot.  Past Medical History:   Diagnosis Date    Diabetes mellitus     Diabetes mellitus, type 2     Hyperlipidemia     Hypertension     Myocardial infarction      Past Surgical History:   Procedure Laterality Date    CORONARY ANGIOGRAPHY N/A 2022    Procedure: Left heart cath;  Surgeon: Rohith Guajardo MD;  Location: [facility] CATH/EP LAB;  Service: Cardiology;  Laterality: N/A;     Family History   Problem Relation Age of Onset    Heart disease Mother     Stroke Mother     Diabetes Mother     Heart disease Father     Diabetes Father      Social History     Socioeconomic History    Marital status: Single   Tobacco Use    Smoking status: Former     Current packs/day: 1.00     Types: Cigarettes    Smokeless tobacco: Never    Tobacco comments:     Quit 2022   Substance and Sexual Activity    Alcohol use: Never    Drug use: Never       Current Outpatient Medications   Medication Sig Dispense Refill    aspirin (ECOTRIN) 81 MG EC tablet Take 81 mg by mouth once daily.      atorvastatin (LIPITOR) 40 MG tablet Take by mouth.      clopidogreL (PLAVIX) 75 mg tablet Take 75 mg by mouth.      ezetimibe (ZETIA) 10 mg tablet Take 10 mg by mouth.      JARDIANCE 10 mg tablet Take 10 mg by mouth every morning.      metFORMIN (GLUCOPHAGE) 1000 MG tablet Take 1,000 mg by mouth 2 (two) times daily with meals.      metoprolol tartrate (LOPRESSOR) 25 MG tablet Take 12.5 mg by mouth 2 (two) times daily.      REPATHA SURECLICK 140 mg/mL PnIj SMARTSI Milligram(s) SUB-Q Every 2 Weeks      semaglutide (OZEMPIC) 0.25 mg or 0.5 mg (2 mg/3 mL) pen injector 0.25 mg.       No current facility-administered medications for this visit.     Review of patient's allergies indicates:   Allergen Reactions    Antihistamines - alkylamine     Sulfa (sulfonamide antibiotics)       Facial swelling       Review of Systems   Musculoskeletal:  Positive for arthralgias and joint swelling.   Skin:  Positive for color change and wound.   Neurological:  Positive for numbness.   All other systems reviewed and are negative.      Objective:      Vitals:    09/21/23 1607   BP: 118/73   BP Location: Right arm   Patient Position: Sitting   Pulse: 101   Weight: 60.8 kg (134 lb)   Height: 5' (1.524 m)     Physical Exam  Vitals and nursing note reviewed.   Constitutional:       Appearance: Normal appearance.   Cardiovascular:      Pulses:           Dorsalis pedis pulses are 2+ on the right side and 2+ on the left side.        Posterior tibial pulses are 1+ on the right side and 1+ on the left side.   Pulmonary:      Effort: Pulmonary effort is normal.   Musculoskeletal:         General: Swelling and tenderness present.      Left foot: Deformity and bunion present.        Feet:    Feet:      Right foot:      Protective Sensation: 4 sites tested.   1 site sensed.     Left foot:      Protective Sensation: 4 sites tested.   1 site sensed.     Skin integrity: Ulcer, skin breakdown, erythema, warmth and callus present.   Skin:     Findings: Erythema present.   Neurological:      Mental Status: She is alert.      Sensory: Sensory deficit present.   Psychiatric:         Mood and Affect: Mood normal.         Behavior: Behavior normal.                                                                                        Assessment:       1. Ulcer of left foot with fat layer exposed    2. Type II diabetes mellitus with neurological manifestations    3. Comprehensive diabetic foot examination, type 2 DM, encounter for    4. Hallux valgus of left foot          Plan:       Patient presents today follow-up on ulceration left foot secondary to severe bunion deformity with dislocation of the 1st MPJ left.   Patient states that she developed a blister that ultimately broke open in and around the ulceration site this drained  a pink fluid from the area.  Patient's ulceration has become larger it is currently 1.0 cm long by 7 mm wide by 3 mm deep there is now kind of a pocket that has formed on the plantar medial aspect of the left hallux.  Patient was advised the area does look considerably better it is smaller it is not as deep I am going to have her start using a P cm X scrub on the area to stimulate healing rinsing with Dakin's applying a small piece of Promogran with silver in the wound site itself covering with Xeroform to prevent the area from drying out too much I am going to follow up with her in 2 weeks certainly any problems questions or concerns she is to contact us immediately.  Face-to-face time including discussion evaluation treatment discussion of treatment options treatment plan and wound care including non excisional debridement equaled 20 minutes.  This note was created using [employer] voice recognition software that occasionally misinterpreted phrases or words.

## 2023-10-05 ENCOUNTER — OFFICE VISIT (OUTPATIENT)
Dept: PODIATRY | Facility: CLINIC | Age: 58
End: 2023-10-05
Payer: COMMERCIAL

## 2023-10-05 VITALS
WEIGHT: 134 LBS | SYSTOLIC BLOOD PRESSURE: 120 MMHG | HEIGHT: 60 IN | HEART RATE: 100 BPM | BODY MASS INDEX: 26.31 KG/M2 | DIASTOLIC BLOOD PRESSURE: 71 MMHG

## 2023-10-05 DIAGNOSIS — L97.522 ULCER OF LEFT FOOT WITH FAT LAYER EXPOSED: Primary | ICD-10-CM

## 2023-10-05 DIAGNOSIS — E11.49 TYPE II DIABETES MELLITUS WITH NEUROLOGICAL MANIFESTATIONS: ICD-10-CM

## 2023-10-05 DIAGNOSIS — E11.9 COMPREHENSIVE DIABETIC FOOT EXAMINATION, TYPE 2 DM, ENCOUNTER FOR: ICD-10-CM

## 2023-10-05 PROCEDURE — 1160F RVW MEDS BY RX/DR IN RCRD: CPT | Mod: CPTII,S$GLB,, | Performed by: PODIATRIST

## 2023-10-05 PROCEDURE — 3074F SYST BP LT 130 MM HG: CPT | Mod: CPTII,S$GLB,, | Performed by: PODIATRIST

## 2023-10-05 PROCEDURE — 99214 OFFICE O/P EST MOD 30 MIN: CPT | Mod: S$GLB,,, | Performed by: PODIATRIST

## 2023-10-05 PROCEDURE — 99999 PR PBB SHADOW E&M-EST. PATIENT-LVL III: ICD-10-PCS | Mod: PBBFAC,,, | Performed by: PODIATRIST

## 2023-10-05 PROCEDURE — 3078F DIAST BP <80 MM HG: CPT | Mod: CPTII,S$GLB,, | Performed by: PODIATRIST

## 2023-10-05 PROCEDURE — 99999 PR PBB SHADOW E&M-EST. PATIENT-LVL III: CPT | Mod: PBBFAC,,, | Performed by: PODIATRIST

## 2023-10-05 PROCEDURE — 99214 PR OFFICE/OUTPT VISIT, EST, LEVL IV, 30-39 MIN: ICD-10-PCS | Mod: S$GLB,,, | Performed by: PODIATRIST

## 2023-10-05 PROCEDURE — 3074F PR MOST RECENT SYSTOLIC BLOOD PRESSURE < 130 MM HG: ICD-10-PCS | Mod: CPTII,S$GLB,, | Performed by: PODIATRIST

## 2023-10-05 PROCEDURE — 1159F PR MEDICATION LIST DOCUMENTED IN MEDICAL RECORD: ICD-10-PCS | Mod: CPTII,S$GLB,, | Performed by: PODIATRIST

## 2023-10-05 PROCEDURE — 87070 CULTURE OTHR SPECIMN AEROBIC: CPT | Performed by: PODIATRIST

## 2023-10-05 PROCEDURE — 3008F PR BODY MASS INDEX (BMI) DOCUMENTED: ICD-10-PCS | Mod: CPTII,S$GLB,, | Performed by: PODIATRIST

## 2023-10-05 PROCEDURE — 1159F MED LIST DOCD IN RCRD: CPT | Mod: CPTII,S$GLB,, | Performed by: PODIATRIST

## 2023-10-05 PROCEDURE — 1160F PR REVIEW ALL MEDS BY PRESCRIBER/CLIN PHARMACIST DOCUMENTED: ICD-10-PCS | Mod: CPTII,S$GLB,, | Performed by: PODIATRIST

## 2023-10-05 PROCEDURE — 3008F BODY MASS INDEX DOCD: CPT | Mod: CPTII,S$GLB,, | Performed by: PODIATRIST

## 2023-10-05 PROCEDURE — 3078F PR MOST RECENT DIASTOLIC BLOOD PRESSURE < 80 MM HG: ICD-10-PCS | Mod: CPTII,S$GLB,, | Performed by: PODIATRIST

## 2023-10-08 NOTE — PROGRESS NOTES
Subjective:       Patient ID: No Munoz is a 58 y.o. female.    Chief Complaint: No chief complaint on file.  Patient presents for follow-up ulceration left foot.  Past Medical History:   Diagnosis Date    Diabetes mellitus     Diabetes mellitus, type 2     Hyperlipidemia     Hypertension     Myocardial infarction      Past Surgical History:   Procedure Laterality Date    CORONARY ANGIOGRAPHY N/A 2022    Procedure: Left heart cath;  Surgeon: Rohith Guajardo MD;  Location: [facility] CATH/EP LAB;  Service: Cardiology;  Laterality: N/A;     Family History   Problem Relation Age of Onset    Heart disease Mother     Stroke Mother     Diabetes Mother     Heart disease Father     Diabetes Father      Social History     Socioeconomic History    Marital status: Single   Tobacco Use    Smoking status: Former     Current packs/day: 1.00     Types: Cigarettes    Smokeless tobacco: Never    Tobacco comments:     Quit 2022   Substance and Sexual Activity    Alcohol use: Never    Drug use: Never       Current Outpatient Medications   Medication Sig Dispense Refill    aspirin (ECOTRIN) 81 MG EC tablet Take 81 mg by mouth once daily.      atorvastatin (LIPITOR) 40 MG tablet Take by mouth.      clopidogreL (PLAVIX) 75 mg tablet Take 75 mg by mouth.      ezetimibe (ZETIA) 10 mg tablet Take 10 mg by mouth.      JARDIANCE 10 mg tablet Take 10 mg by mouth every morning.      metFORMIN (GLUCOPHAGE) 1000 MG tablet Take 1,000 mg by mouth 2 (two) times daily with meals.      metoprolol tartrate (LOPRESSOR) 25 MG tablet Take 12.5 mg by mouth 2 (two) times daily.      REPATHA SURECLICK 140 mg/mL PnIj SMARTSI Milligram(s) SUB-Q Every 2 Weeks      semaglutide (OZEMPIC) 0.25 mg or 0.5 mg (2 mg/3 mL) pen injector 0.25 mg.       No current facility-administered medications for this visit.     Review of patient's allergies indicates:   Allergen Reactions    Antihistamines - alkylamine     Sulfa (sulfonamide antibiotics)       Facial swelling       Review of Systems   Musculoskeletal:  Positive for arthralgias and joint swelling.   Skin:  Positive for color change and wound.   Neurological:  Positive for numbness.   All other systems reviewed and are negative.      Objective:      Vitals:    10/05/23 1603   BP: 120/71   BP Location: Right arm   Patient Position: Sitting   Pulse: 100   Weight: 60.8 kg (134 lb)   Height: 5' (1.524 m)     Physical Exam  Vitals and nursing note reviewed.   Constitutional:       Appearance: Normal appearance.   Cardiovascular:      Pulses:           Dorsalis pedis pulses are 2+ on the right side and 2+ on the left side.        Posterior tibial pulses are 1+ on the right side and 1+ on the left side.   Pulmonary:      Effort: Pulmonary effort is normal.   Musculoskeletal:         General: Swelling and tenderness present.      Left foot: Deformity and bunion present.        Feet:    Feet:      Right foot:      Protective Sensation: 4 sites tested.   1 site sensed.     Left foot:      Protective Sensation: 4 sites tested.   1 site sensed.     Skin integrity: Ulcer, skin breakdown, erythema, warmth and callus present.   Skin:     Findings: Erythema present.   Neurological:      Mental Status: She is alert.      Sensory: Sensory deficit present.   Psychiatric:         Mood and Affect: Mood normal.         Behavior: Behavior normal.                                                                                                  Assessment:       1. Ulcer of left foot with fat layer exposed    2. Type II diabetes mellitus with neurological manifestations    3. Comprehensive diabetic foot examination, type 2 DM, encounter for          Plan:       Patient presents today follow-up on ulceration left foot secondary to severe bunion deformity with dislocation of the 1st MPJ left.   Patient states that she developed a blister that ultimately broke open in and around the ulceration site this drained a pink fluid from  the area.  Patient's ulceration has become larger it is currently 1.0 cm long by 2 mm wide by 3 mm deep .  Patient was advised this ulceration area is considerably smaller it is not as deep it has improved considerably however I believe the P cm X scrub while useful has dried out the surrounding skin overlying the bunion area too much it is caused it to callus over and become excessively dry.  Patient will clean with Dakin solution she is going to apply a small piece of Promogran with silver in the wound site itself cover the bunion area that is excessively dry with Xeroform.  Patient advised certainly if it looks too moist she can alternate 1 day dry dressing 1 day Xeroform I did do a culture and sensitivity because it was some mild drainage from the ulcer site I want to ensure there is no active bacterial involvement that could certainly delay healing plan follow-up will be 2 weeks.  Non excisional debridement performed today.  This note was created using [employer] voice recognition software that occasionally misinterpreted phrases or words.

## 2023-10-09 ENCOUNTER — TELEPHONE (OUTPATIENT)
Dept: PODIATRY | Facility: CLINIC | Age: 58
End: 2023-10-09
Payer: COMMERCIAL

## 2023-10-09 LAB — BACTERIA SPEC AEROBE CULT: NORMAL

## 2023-10-09 NOTE — TELEPHONE ENCOUNTER
----- Message from Everardo Sweeney DPM sent at 10/9/2023  9:28 AM CDT -----  Please call the patient and advise her culture was negative for any additional bacterial growth other than normal skin bacteria she does not need an antibiotic.  ----- Message -----  From: [facility] Interface  Sent: 10/7/2023   6:41 AM CDT  To: Everardo Sweeney DPM

## 2023-10-19 ENCOUNTER — OFFICE VISIT (OUTPATIENT)
Dept: PODIATRY | Facility: CLINIC | Age: 58
End: 2023-10-19
Payer: COMMERCIAL

## 2023-10-19 VITALS
BODY MASS INDEX: 25.3 KG/M2 | WEIGHT: 134 LBS | HEIGHT: 61 IN | HEART RATE: 99 BPM | SYSTOLIC BLOOD PRESSURE: 116 MMHG | DIASTOLIC BLOOD PRESSURE: 64 MMHG

## 2023-10-19 DIAGNOSIS — E11.9 COMPREHENSIVE DIABETIC FOOT EXAMINATION, TYPE 2 DM, ENCOUNTER FOR: ICD-10-CM

## 2023-10-19 DIAGNOSIS — L97.522 ULCER OF LEFT FOOT WITH FAT LAYER EXPOSED: Primary | ICD-10-CM

## 2023-10-19 DIAGNOSIS — E11.49 TYPE II DIABETES MELLITUS WITH NEUROLOGICAL MANIFESTATIONS: ICD-10-CM

## 2023-10-19 DIAGNOSIS — M20.12 HALLUX VALGUS OF LEFT FOOT: ICD-10-CM

## 2023-10-19 PROCEDURE — 99999 PR PBB SHADOW E&M-EST. PATIENT-LVL IV: CPT | Mod: PBBFAC,,, | Performed by: PODIATRIST

## 2023-10-19 PROCEDURE — 1160F RVW MEDS BY RX/DR IN RCRD: CPT | Mod: CPTII,S$GLB,, | Performed by: PODIATRIST

## 2023-10-19 PROCEDURE — 3008F PR BODY MASS INDEX (BMI) DOCUMENTED: ICD-10-PCS | Mod: CPTII,S$GLB,, | Performed by: PODIATRIST

## 2023-10-19 PROCEDURE — 3078F PR MOST RECENT DIASTOLIC BLOOD PRESSURE < 80 MM HG: ICD-10-PCS | Mod: CPTII,S$GLB,, | Performed by: PODIATRIST

## 2023-10-19 PROCEDURE — 3074F PR MOST RECENT SYSTOLIC BLOOD PRESSURE < 130 MM HG: ICD-10-PCS | Mod: CPTII,S$GLB,, | Performed by: PODIATRIST

## 2023-10-19 PROCEDURE — 3008F BODY MASS INDEX DOCD: CPT | Mod: CPTII,S$GLB,, | Performed by: PODIATRIST

## 2023-10-19 PROCEDURE — 99999 PR PBB SHADOW E&M-EST. PATIENT-LVL IV: ICD-10-PCS | Mod: PBBFAC,,, | Performed by: PODIATRIST

## 2023-10-19 PROCEDURE — 3078F DIAST BP <80 MM HG: CPT | Mod: CPTII,S$GLB,, | Performed by: PODIATRIST

## 2023-10-19 PROCEDURE — 99214 OFFICE O/P EST MOD 30 MIN: CPT | Mod: S$GLB,,, | Performed by: PODIATRIST

## 2023-10-19 PROCEDURE — 1160F PR REVIEW ALL MEDS BY PRESCRIBER/CLIN PHARMACIST DOCUMENTED: ICD-10-PCS | Mod: CPTII,S$GLB,, | Performed by: PODIATRIST

## 2023-10-19 PROCEDURE — 99214 PR OFFICE/OUTPT VISIT, EST, LEVL IV, 30-39 MIN: ICD-10-PCS | Mod: S$GLB,,, | Performed by: PODIATRIST

## 2023-10-19 PROCEDURE — 1159F MED LIST DOCD IN RCRD: CPT | Mod: CPTII,S$GLB,, | Performed by: PODIATRIST

## 2023-10-19 PROCEDURE — 3074F SYST BP LT 130 MM HG: CPT | Mod: CPTII,S$GLB,, | Performed by: PODIATRIST

## 2023-10-19 PROCEDURE — 1159F PR MEDICATION LIST DOCUMENTED IN MEDICAL RECORD: ICD-10-PCS | Mod: CPTII,S$GLB,, | Performed by: PODIATRIST

## 2023-10-22 NOTE — PROGRESS NOTES
Subjective:       Patient ID: No uMnoz is a 58 y.o. female.    Chief Complaint: Foot Ulcer and Follow-up  Patient presents for follow-up ulceration left foot.  Past Medical History:   Diagnosis Date    Diabetes mellitus     Diabetes mellitus, type 2     Hyperlipidemia     Hypertension     Myocardial infarction      Past Surgical History:   Procedure Laterality Date    CORONARY ANGIOGRAPHY N/A 2022    Procedure: Left heart cath;  Surgeon: Rohith Guajardo MD;  Location: [facility] CATH/EP LAB;  Service: Cardiology;  Laterality: N/A;     Family History   Problem Relation Age of Onset    Heart disease Mother     Stroke Mother     Diabetes Mother     Heart disease Father     Diabetes Father      Social History     Socioeconomic History    Marital status: Single   Tobacco Use    Smoking status: Former     Current packs/day: 1.00     Types: Cigarettes    Smokeless tobacco: Never    Tobacco comments:     Quit 2022   Substance and Sexual Activity    Alcohol use: Never    Drug use: Never       Current Outpatient Medications   Medication Sig Dispense Refill    aspirin (ECOTRIN) 81 MG EC tablet Take 81 mg by mouth once daily.      atorvastatin (LIPITOR) 40 MG tablet Take by mouth.      clopidogreL (PLAVIX) 75 mg tablet Take 75 mg by mouth.      JARDIANCE 10 mg tablet Take 10 mg by mouth every morning.      metFORMIN (GLUCOPHAGE) 1000 MG tablet Take 1,000 mg by mouth 2 (two) times daily with meals.      metoprolol tartrate (LOPRESSOR) 25 MG tablet Take 12.5 mg by mouth 2 (two) times daily.      REPATHA SURECLICK 140 mg/mL PnIj SMARTSI Milligram(s) SUB-Q Every 2 Weeks      semaglutide (OZEMPIC) 0.25 mg or 0.5 mg (2 mg/3 mL) pen injector 0.25 mg.       No current facility-administered medications for this visit.     Review of patient's allergies indicates:   Allergen Reactions    Antihistamines - alkylamine     Sulfa (sulfonamide antibiotics)      Facial swelling       Review of Systems  "  Musculoskeletal:  Positive for arthralgias and joint swelling.   Skin:  Positive for color change and wound.   Neurological:  Positive for numbness.   All other systems reviewed and are negative.      Objective:      Vitals:    10/19/23 1607   BP: 116/64   BP Location: Right arm   Patient Position: Sitting   Pulse: 99   Weight: 60.8 kg (134 lb)   Height: 5' 1" (1.549 m)     Physical Exam  Vitals and nursing note reviewed.   Constitutional:       Appearance: Normal appearance.   Cardiovascular:      Pulses:           Dorsalis pedis pulses are 2+ on the right side and 2+ on the left side.        Posterior tibial pulses are 1+ on the right side and 1+ on the left side.   Pulmonary:      Effort: Pulmonary effort is normal.   Musculoskeletal:         General: Swelling and tenderness present.      Left foot: Deformity and bunion present.        Feet:    Feet:      Right foot:      Protective Sensation: 4 sites tested.   1 site sensed.     Left foot:      Protective Sensation: 4 sites tested.   1 site sensed.     Skin integrity: Ulcer, skin breakdown, erythema, warmth and callus present.   Skin:     Findings: Erythema present.   Neurological:      Mental Status: She is alert.      Sensory: Sensory deficit present.   Psychiatric:         Mood and Affect: Mood normal.         Behavior: Behavior normal.                                                                                                                Assessment:       1. Ulcer of left foot with fat layer exposed    2. Hallux valgus of left foot    3. Type II diabetes mellitus with neurological manifestations    4. Comprehensive diabetic foot examination, type 2 DM, encounter for          Plan:       Patient presents today follow-up on ulceration left foot secondary to severe bunion deformity with dislocation of the 1st MPJ left.    On evaluation patient is still developing a large aloe area of callus tissue overlying the medial eminence and plantar aspect of the " 1st MPJ left.  Patient still has a small linear ulceration that is approximately 1 cm long by 2 mm wide by 3 mm deep there is mild drainage noted at the base of the ulcer I did non excisionally debride the large area of callus tissue there was no active signs of infection in this area and there is healthy underlying tissue present.  We had subsequently discontinued the P cm X scrub because we felt it was drying out the skin too much patient has been applying Xeroform over the area however this also remains very dry and callused certainly all of this is being caused by the bunion deformity that is stretching the skin as well as the patient's weight-bearing and irritation however the patient needs to be able to work and needs to stand on her feet and we are protecting the area as much as possible with dressings.  I am going to have the patient clean everything with Dakin solution she is going to begin packing the small open ulceration gently packing it with iodoform packing strips covering the larger area with Xeroform still and padding the area I do plan to follow up with her in 2 weeks I am hopeful that the Xeroform will help and assist in closure and healing of the wound site itself.  Ultimately at some point the patient is going to need to have the bunion corrected with her history of diabetes as well as smoking she is at high risk for worsening condition or additional ulceration in the future including infection.  Patient will contact us with any problems questions or concerns prior to scheduled follow-up.  This note was created using [employer] voice recognition software that occasionally misinterpreted phrases or words.

## 2023-11-02 ENCOUNTER — OFFICE VISIT (OUTPATIENT)
Dept: PODIATRY | Facility: CLINIC | Age: 58
End: 2023-11-02
Payer: COMMERCIAL

## 2023-11-02 VITALS
DIASTOLIC BLOOD PRESSURE: 74 MMHG | BODY MASS INDEX: 23.43 KG/M2 | WEIGHT: 124 LBS | HEART RATE: 105 BPM | SYSTOLIC BLOOD PRESSURE: 108 MMHG

## 2023-11-02 DIAGNOSIS — E11.49 TYPE II DIABETES MELLITUS WITH NEUROLOGICAL MANIFESTATIONS: ICD-10-CM

## 2023-11-02 DIAGNOSIS — E11.9 COMPREHENSIVE DIABETIC FOOT EXAMINATION, TYPE 2 DM, ENCOUNTER FOR: ICD-10-CM

## 2023-11-02 DIAGNOSIS — M20.12 HALLUX VALGUS OF LEFT FOOT: ICD-10-CM

## 2023-11-02 DIAGNOSIS — L97.522 ULCER OF LEFT FOOT WITH FAT LAYER EXPOSED: Primary | ICD-10-CM

## 2023-11-02 PROCEDURE — 3078F PR MOST RECENT DIASTOLIC BLOOD PRESSURE < 80 MM HG: ICD-10-PCS | Mod: CPTII,S$GLB,, | Performed by: PODIATRIST

## 2023-11-02 PROCEDURE — 1159F PR MEDICATION LIST DOCUMENTED IN MEDICAL RECORD: ICD-10-PCS | Mod: CPTII,S$GLB,, | Performed by: PODIATRIST

## 2023-11-02 PROCEDURE — 99213 PR OFFICE/OUTPT VISIT, EST, LEVL III, 20-29 MIN: ICD-10-PCS | Mod: S$GLB,,, | Performed by: PODIATRIST

## 2023-11-02 PROCEDURE — 1160F PR REVIEW ALL MEDS BY PRESCRIBER/CLIN PHARMACIST DOCUMENTED: ICD-10-PCS | Mod: CPTII,S$GLB,, | Performed by: PODIATRIST

## 2023-11-02 PROCEDURE — 3074F PR MOST RECENT SYSTOLIC BLOOD PRESSURE < 130 MM HG: ICD-10-PCS | Mod: CPTII,S$GLB,, | Performed by: PODIATRIST

## 2023-11-02 PROCEDURE — 3074F SYST BP LT 130 MM HG: CPT | Mod: CPTII,S$GLB,, | Performed by: PODIATRIST

## 2023-11-02 PROCEDURE — 3008F PR BODY MASS INDEX (BMI) DOCUMENTED: ICD-10-PCS | Mod: CPTII,S$GLB,, | Performed by: PODIATRIST

## 2023-11-02 PROCEDURE — 99999 PR PBB SHADOW E&M-EST. PATIENT-LVL III: ICD-10-PCS | Mod: PBBFAC,,, | Performed by: PODIATRIST

## 2023-11-02 PROCEDURE — 3078F DIAST BP <80 MM HG: CPT | Mod: CPTII,S$GLB,, | Performed by: PODIATRIST

## 2023-11-02 PROCEDURE — 99213 OFFICE O/P EST LOW 20 MIN: CPT | Mod: S$GLB,,, | Performed by: PODIATRIST

## 2023-11-02 PROCEDURE — 1160F RVW MEDS BY RX/DR IN RCRD: CPT | Mod: CPTII,S$GLB,, | Performed by: PODIATRIST

## 2023-11-02 PROCEDURE — 3008F BODY MASS INDEX DOCD: CPT | Mod: CPTII,S$GLB,, | Performed by: PODIATRIST

## 2023-11-02 PROCEDURE — 99999 PR PBB SHADOW E&M-EST. PATIENT-LVL III: CPT | Mod: PBBFAC,,, | Performed by: PODIATRIST

## 2023-11-02 PROCEDURE — 1159F MED LIST DOCD IN RCRD: CPT | Mod: CPTII,S$GLB,, | Performed by: PODIATRIST

## 2023-11-05 NOTE — PROGRESS NOTES
Subjective:       Patient ID: No Munoz is a 58 y.o. female.    Chief Complaint: Foot Ulcer  Patient presents for follow-up ulceration left foot.  Past Medical History:   Diagnosis Date    Diabetes mellitus     Diabetes mellitus, type 2     Hyperlipidemia     Hypertension     Myocardial infarction      Past Surgical History:   Procedure Laterality Date    CORONARY ANGIOGRAPHY N/A 2022    Procedure: Left heart cath;  Surgeon: Rohith Guajardo MD;  Location: [facility] CATH/EP LAB;  Service: Cardiology;  Laterality: N/A;     Family History   Problem Relation Age of Onset    Heart disease Mother     Stroke Mother     Diabetes Mother     Heart disease Father     Diabetes Father      Social History     Socioeconomic History    Marital status: Single   Tobacco Use    Smoking status: Former     Current packs/day: 1.00     Types: Cigarettes    Smokeless tobacco: Never    Tobacco comments:     Quit 2022   Substance and Sexual Activity    Alcohol use: Never    Drug use: Never       Current Outpatient Medications   Medication Sig Dispense Refill    aspirin (ECOTRIN) 81 MG EC tablet Take 81 mg by mouth once daily.      atorvastatin (LIPITOR) 40 MG tablet Take by mouth.      clopidogreL (PLAVIX) 75 mg tablet Take 75 mg by mouth.      JARDIANCE 10 mg tablet Take 10 mg by mouth every morning.      metFORMIN (GLUCOPHAGE) 1000 MG tablet Take 1,000 mg by mouth 2 (two) times daily with meals.      metoprolol tartrate (LOPRESSOR) 25 MG tablet Take 12.5 mg by mouth 2 (two) times daily.      REPATHA SURECLICK 140 mg/mL PnIj SMARTSI Milligram(s) SUB-Q Every 2 Weeks      semaglutide (OZEMPIC) 0.25 mg or 0.5 mg (2 mg/3 mL) pen injector 0.25 mg.       No current facility-administered medications for this visit.     Review of patient's allergies indicates:   Allergen Reactions    Antihistamines - alkylamine     Sulfa (sulfonamide antibiotics)      Facial swelling       Review of Systems   Musculoskeletal:  Positive  for arthralgias and joint swelling.   Skin:  Positive for color change and wound.   Neurological:  Positive for numbness.   All other systems reviewed and are negative.      Objective:      Vitals:    11/02/23 1541   BP: 108/74   BP Location: Right arm   Patient Position: Sitting   Pulse: 105   Weight: 56.2 kg (124 lb)     Physical Exam  Vitals and nursing note reviewed.   Constitutional:       Appearance: Normal appearance.   Cardiovascular:      Pulses:           Dorsalis pedis pulses are 2+ on the right side and 2+ on the left side.        Posterior tibial pulses are 1+ on the right side and 1+ on the left side.   Pulmonary:      Effort: Pulmonary effort is normal.   Musculoskeletal:         General: Swelling and tenderness present.      Left foot: Deformity and bunion present.        Feet:    Feet:      Right foot:      Protective Sensation: 4 sites tested.   1 site sensed.     Left foot:      Protective Sensation: 4 sites tested.   1 site sensed.     Skin integrity: Ulcer, skin breakdown, erythema, warmth and callus present.   Skin:     Findings: Erythema present.   Neurological:      Mental Status: She is alert.      Sensory: Sensory deficit present.   Psychiatric:         Mood and Affect: Mood normal.         Behavior: Behavior normal.                                                                                                                                  Assessment:       1. Ulcer of left foot with fat layer exposed    2. Type II diabetes mellitus with neurological manifestations    3. Comprehensive diabetic foot examination, type 2 DM, encounter for    4. Hallux valgus of left foot          Plan:       Patient presents today follow-up on ulceration left foot secondary to severe bunion deformity with dislocation of the 1st MPJ left.    On evaluation patient is still developing a large aloe area of callus tissue overlying the medial eminence and plantar aspect of the 1st MPJ left.  Patient still has a  small linear ulceration that is approximately 1 cm long by 7 mm wide by 3 mm deep there is mild drainage noted at the base of the ulcer I did non excisionally debride the large area of callus tissue there was no active signs of infection in this area and there is healthy underlying tissue present.  Patient still has a considerable amount of softness an area of breakdown with mixed callus formation.  I am going to have the patient discontinue the packing with the iodoform strips and I am only going to have her apply the Xeroform every other day on the off day she is going to apply Betadine after the areas cleaned with Dakin solution the ulceration itself is wider than it had been I advised the patient I am not sure that it is really gotten bigger but some of the tissue that was overlapping the edges has receded some making it appear wider.  I know the patient does not want to go back in the fracture boot however this would be the best thing for her certainly the patient's bunion deformity is a contributing factor to getting this area healed we will see how the patient does over the next 2 weeks alternating 1 day Xeroform 1 day Betadine.  Patient was dispensed a very aggressive size small adhesive felt metatarsal pad that I put in the patient's shoe will see how well she tolerates this but ultimately we have offload pressure from the area of ulceration in order to get results of healing.  This note was created using [employer] voice recognition software that occasionally misinterpreted phrases or words.

## 2023-11-16 ENCOUNTER — OFFICE VISIT (OUTPATIENT)
Dept: PODIATRY | Facility: CLINIC | Age: 58
End: 2023-11-16
Payer: COMMERCIAL

## 2023-11-16 VITALS
BODY MASS INDEX: 23.41 KG/M2 | WEIGHT: 124 LBS | HEART RATE: 101 BPM | SYSTOLIC BLOOD PRESSURE: 111 MMHG | DIASTOLIC BLOOD PRESSURE: 74 MMHG | HEIGHT: 61 IN

## 2023-11-16 DIAGNOSIS — M20.12 HALLUX VALGUS OF LEFT FOOT: ICD-10-CM

## 2023-11-16 DIAGNOSIS — L97.522 ULCER OF LEFT FOOT WITH FAT LAYER EXPOSED: Primary | ICD-10-CM

## 2023-11-16 DIAGNOSIS — E11.49 TYPE II DIABETES MELLITUS WITH NEUROLOGICAL MANIFESTATIONS: ICD-10-CM

## 2023-11-16 DIAGNOSIS — Z72.0 TOBACCO USE: ICD-10-CM

## 2023-11-16 PROCEDURE — 99999 PR PBB SHADOW E&M-EST. PATIENT-LVL IV: ICD-10-PCS | Mod: PBBFAC,,, | Performed by: PODIATRIST

## 2023-11-16 PROCEDURE — 1160F PR REVIEW ALL MEDS BY PRESCRIBER/CLIN PHARMACIST DOCUMENTED: ICD-10-PCS | Mod: CPTII,S$GLB,, | Performed by: PODIATRIST

## 2023-11-16 PROCEDURE — 3078F DIAST BP <80 MM HG: CPT | Mod: CPTII,S$GLB,, | Performed by: PODIATRIST

## 2023-11-16 PROCEDURE — 99214 OFFICE O/P EST MOD 30 MIN: CPT | Mod: S$GLB,,, | Performed by: PODIATRIST

## 2023-11-16 PROCEDURE — 1159F MED LIST DOCD IN RCRD: CPT | Mod: CPTII,S$GLB,, | Performed by: PODIATRIST

## 2023-11-16 PROCEDURE — 99214 PR OFFICE/OUTPT VISIT, EST, LEVL IV, 30-39 MIN: ICD-10-PCS | Mod: S$GLB,,, | Performed by: PODIATRIST

## 2023-11-16 PROCEDURE — 3078F PR MOST RECENT DIASTOLIC BLOOD PRESSURE < 80 MM HG: ICD-10-PCS | Mod: CPTII,S$GLB,, | Performed by: PODIATRIST

## 2023-11-16 PROCEDURE — 3008F PR BODY MASS INDEX (BMI) DOCUMENTED: ICD-10-PCS | Mod: CPTII,S$GLB,, | Performed by: PODIATRIST

## 2023-11-16 PROCEDURE — 1159F PR MEDICATION LIST DOCUMENTED IN MEDICAL RECORD: ICD-10-PCS | Mod: CPTII,S$GLB,, | Performed by: PODIATRIST

## 2023-11-16 PROCEDURE — 99999 PR PBB SHADOW E&M-EST. PATIENT-LVL IV: CPT | Mod: PBBFAC,,, | Performed by: PODIATRIST

## 2023-11-16 PROCEDURE — 3008F BODY MASS INDEX DOCD: CPT | Mod: CPTII,S$GLB,, | Performed by: PODIATRIST

## 2023-11-16 PROCEDURE — 3074F PR MOST RECENT SYSTOLIC BLOOD PRESSURE < 130 MM HG: ICD-10-PCS | Mod: CPTII,S$GLB,, | Performed by: PODIATRIST

## 2023-11-16 PROCEDURE — 3074F SYST BP LT 130 MM HG: CPT | Mod: CPTII,S$GLB,, | Performed by: PODIATRIST

## 2023-11-16 PROCEDURE — 1160F RVW MEDS BY RX/DR IN RCRD: CPT | Mod: CPTII,S$GLB,, | Performed by: PODIATRIST

## 2023-11-19 NOTE — PROGRESS NOTES
Subjective:       Patient ID: No Munoz is a 58 y.o. female.    Chief Complaint: Foot Ulcer  Patient presents for follow-up ulceration left foot.  Past Medical History:   Diagnosis Date    Diabetes mellitus     Diabetes mellitus, type 2     Hyperlipidemia     Hypertension     Myocardial infarction      Past Surgical History:   Procedure Laterality Date    CORONARY ANGIOGRAPHY N/A 2022    Procedure: Left heart cath;  Surgeon: Rohith Guajardo MD;  Location: [facility] CATH/EP LAB;  Service: Cardiology;  Laterality: N/A;     Family History   Problem Relation Age of Onset    Heart disease Mother     Stroke Mother     Diabetes Mother     Heart disease Father     Diabetes Father      Social History     Socioeconomic History    Marital status: Single   Tobacco Use    Smoking status: Former     Current packs/day: 1.00     Types: Cigarettes    Smokeless tobacco: Never    Tobacco comments:     Quit 2022   Substance and Sexual Activity    Alcohol use: Never    Drug use: Never       Current Outpatient Medications   Medication Sig Dispense Refill    aspirin (ECOTRIN) 81 MG EC tablet Take 81 mg by mouth once daily.      atorvastatin (LIPITOR) 40 MG tablet Take by mouth.      clopidogreL (PLAVIX) 75 mg tablet Take 75 mg by mouth.      JARDIANCE 10 mg tablet Take 10 mg by mouth every morning.      metFORMIN (GLUCOPHAGE) 1000 MG tablet Take 1,000 mg by mouth 2 (two) times daily with meals.      metoprolol tartrate (LOPRESSOR) 25 MG tablet Take 12.5 mg by mouth 2 (two) times daily.      REPATHA SURECLICK 140 mg/mL PnIj SMARTSI Milligram(s) SUB-Q Every 2 Weeks      semaglutide (OZEMPIC) 0.25 mg or 0.5 mg (2 mg/3 mL) pen injector 0.25 mg.       No current facility-administered medications for this visit.     Review of patient's allergies indicates:   Allergen Reactions    Antihistamines - alkylamine     Sulfa (sulfonamide antibiotics)      Facial swelling       Review of Systems   Musculoskeletal:  Positive  "for arthralgias and joint swelling.   Skin:  Positive for color change and wound.   Neurological:  Positive for numbness.   All other systems reviewed and are negative.      Objective:      Vitals:    11/16/23 1631   BP: 111/74   BP Location: Left arm   Patient Position: Sitting   Pulse: 101   Weight: 56.2 kg (124 lb)   Height: 5' 1" (1.549 m)     Physical Exam  Vitals and nursing note reviewed.   Constitutional:       Appearance: Normal appearance.   Cardiovascular:      Pulses:           Dorsalis pedis pulses are 2+ on the right side and 2+ on the left side.        Posterior tibial pulses are 1+ on the right side and 1+ on the left side.   Pulmonary:      Effort: Pulmonary effort is normal.   Musculoskeletal:         General: Swelling and tenderness present.      Left foot: Deformity and bunion present.        Feet:    Feet:      Right foot:      Protective Sensation: 4 sites tested.   1 site sensed.     Left foot:      Protective Sensation: 4 sites tested.   1 site sensed.     Skin integrity: Ulcer, skin breakdown, erythema, warmth and callus present.   Skin:     Findings: Erythema present.   Neurological:      Mental Status: She is alert.      Sensory: Sensory deficit present.   Psychiatric:         Mood and Affect: Mood normal.         Behavior: Behavior normal.                                                                                                                                                    Assessment:       1. Ulcer of left foot with fat layer exposed    2. Hallux valgus of left foot    3. Type II diabetes mellitus with neurological manifestations    4. Tobacco use          Plan:       Patient presents today follow-up on ulceration left foot secondary to severe bunion deformity with dislocation of the 1st MPJ left.    On evaluation patient is still developing a large area of callus tissue overlying the medial eminence and plantar aspect of the 1st MPJ left.  Patient still has a small linear " ulceration that is approximately 1 cm long by 5 mm wide by 3 mm deep there is mild drainage noted at the base of the ulcer I did non excisionally debride the large area of callus tissue there was no active signs of infection in this area and there is healthy underlying tissue present.  Patient still has a considerable amount of softness an area of breakdown with mixed callus formation.  Patient had discontinued the application of the iodine she states it just dry the area out entirely too much she has alternating 1 day Medihoney 1 day Xeroform she states this seems to be keeping the largest area of callus formation from becoming too moist and to dry.  I am going to have the patient continue to clean the area with Dakin solution the large area she can alternate Medihoney and Xeroform I am going to have her start using Ioplex to the small wound tucking a piece in to help absorb drainage and make sure there was no bacterial load on the surface of the wound I did advise her the wound is slightly smaller although certainly not healing to the right which we would like.  Plan follow-up will be 2 weeks patient has been advised to contact us with any problems questions or concerns prior to scheduled follow-up.  Certainly the patient's bunion is the largest contributing factor to the delayed and nonhealing of the ulceration site patient does work on her feet many hours per day this is also a contributing factor.  This note was created using [employer] voice recognition software that occasionally misinterpreted phrases or words.

## 2023-11-30 ENCOUNTER — OFFICE VISIT (OUTPATIENT)
Dept: PODIATRY | Facility: CLINIC | Age: 58
End: 2023-11-30
Payer: COMMERCIAL

## 2023-11-30 VITALS
HEIGHT: 61 IN | DIASTOLIC BLOOD PRESSURE: 68 MMHG | SYSTOLIC BLOOD PRESSURE: 119 MMHG | HEART RATE: 104 BPM | WEIGHT: 124 LBS | BODY MASS INDEX: 23.41 KG/M2

## 2023-11-30 DIAGNOSIS — M20.12 HALLUX VALGUS OF LEFT FOOT: ICD-10-CM

## 2023-11-30 DIAGNOSIS — Z72.0 TOBACCO USE: ICD-10-CM

## 2023-11-30 DIAGNOSIS — E11.49 TYPE II DIABETES MELLITUS WITH NEUROLOGICAL MANIFESTATIONS: ICD-10-CM

## 2023-11-30 DIAGNOSIS — E11.9 COMPREHENSIVE DIABETIC FOOT EXAMINATION, TYPE 2 DM, ENCOUNTER FOR: ICD-10-CM

## 2023-11-30 DIAGNOSIS — L97.522 ULCER OF LEFT FOOT WITH FAT LAYER EXPOSED: Primary | ICD-10-CM

## 2023-11-30 PROCEDURE — 99999 PR PBB SHADOW E&M-EST. PATIENT-LVL IV: ICD-10-PCS | Mod: PBBFAC,,, | Performed by: PODIATRIST

## 2023-11-30 PROCEDURE — 1159F MED LIST DOCD IN RCRD: CPT | Mod: CPTII,S$GLB,, | Performed by: PODIATRIST

## 2023-11-30 PROCEDURE — 1159F PR MEDICATION LIST DOCUMENTED IN MEDICAL RECORD: ICD-10-PCS | Mod: CPTII,S$GLB,, | Performed by: PODIATRIST

## 2023-11-30 PROCEDURE — 3008F BODY MASS INDEX DOCD: CPT | Mod: CPTII,S$GLB,, | Performed by: PODIATRIST

## 2023-11-30 PROCEDURE — 3074F SYST BP LT 130 MM HG: CPT | Mod: CPTII,S$GLB,, | Performed by: PODIATRIST

## 2023-11-30 PROCEDURE — 3008F PR BODY MASS INDEX (BMI) DOCUMENTED: ICD-10-PCS | Mod: CPTII,S$GLB,, | Performed by: PODIATRIST

## 2023-11-30 PROCEDURE — 1160F RVW MEDS BY RX/DR IN RCRD: CPT | Mod: CPTII,S$GLB,, | Performed by: PODIATRIST

## 2023-11-30 PROCEDURE — 3074F PR MOST RECENT SYSTOLIC BLOOD PRESSURE < 130 MM HG: ICD-10-PCS | Mod: CPTII,S$GLB,, | Performed by: PODIATRIST

## 2023-11-30 PROCEDURE — 99214 PR OFFICE/OUTPT VISIT, EST, LEVL IV, 30-39 MIN: ICD-10-PCS | Mod: S$GLB,,, | Performed by: PODIATRIST

## 2023-11-30 PROCEDURE — 99999 PR PBB SHADOW E&M-EST. PATIENT-LVL IV: CPT | Mod: PBBFAC,,, | Performed by: PODIATRIST

## 2023-11-30 PROCEDURE — 3078F PR MOST RECENT DIASTOLIC BLOOD PRESSURE < 80 MM HG: ICD-10-PCS | Mod: CPTII,S$GLB,, | Performed by: PODIATRIST

## 2023-11-30 PROCEDURE — 3078F DIAST BP <80 MM HG: CPT | Mod: CPTII,S$GLB,, | Performed by: PODIATRIST

## 2023-11-30 PROCEDURE — 1160F PR REVIEW ALL MEDS BY PRESCRIBER/CLIN PHARMACIST DOCUMENTED: ICD-10-PCS | Mod: CPTII,S$GLB,, | Performed by: PODIATRIST

## 2023-11-30 PROCEDURE — 99214 OFFICE O/P EST MOD 30 MIN: CPT | Mod: S$GLB,,, | Performed by: PODIATRIST

## 2023-11-30 RX ORDER — METFORMIN HYDROCHLORIDE 500 MG/1
1000 TABLET, EXTENDED RELEASE ORAL 2 TIMES DAILY
COMMUNITY
Start: 2023-11-13

## 2023-12-03 NOTE — PROGRESS NOTES
Subjective:       Patient ID: No Munoz is a 58 y.o. female.    Chief Complaint: Foot Ulcer (Left foot)  Patient presents for follow-up ulceration left foot.  Past Medical History:   Diagnosis Date    Diabetes mellitus     Diabetes mellitus, type 2     Hyperlipidemia     Hypertension     Myocardial infarction      Past Surgical History:   Procedure Laterality Date    CORONARY ANGIOGRAPHY N/A 2022    Procedure: Left heart cath;  Surgeon: Rohith Guajardo MD;  Location: [facility] CATH/EP LAB;  Service: Cardiology;  Laterality: N/A;     Family History   Problem Relation Age of Onset    Heart disease Mother     Stroke Mother     Diabetes Mother     Heart disease Father     Diabetes Father      Social History     Socioeconomic History    Marital status: Single   Tobacco Use    Smoking status: Former     Current packs/day: 1.00     Types: Cigarettes    Smokeless tobacco: Never    Tobacco comments:     Quit 2022   Substance and Sexual Activity    Alcohol use: Never    Drug use: Never       Current Outpatient Medications   Medication Sig Dispense Refill    aspirin (ECOTRIN) 81 MG EC tablet Take 81 mg by mouth once daily.      atorvastatin (LIPITOR) 40 MG tablet Take by mouth.      clopidogreL (PLAVIX) 75 mg tablet Take 75 mg by mouth.      JARDIANCE 10 mg tablet Take 10 mg by mouth every morning.      metFORMIN (GLUCOPHAGE-XR) 500 MG ER 24hr tablet Take 1,000 mg by mouth 2 (two) times daily.      metoprolol tartrate (LOPRESSOR) 25 MG tablet Take 12.5 mg by mouth 2 (two) times daily.      REPATHA SURECLICK 140 mg/mL PnIj SMARTSI Milligram(s) SUB-Q Every 2 Weeks      semaglutide (OZEMPIC) 0.25 mg or 0.5 mg (2 mg/3 mL) pen injector 0.25 mg.       No current facility-administered medications for this visit.     Review of patient's allergies indicates:   Allergen Reactions    Antihistamines - alkylamine     Sulfa (sulfonamide antibiotics)      Facial swelling       Review of Systems   Musculoskeletal:  " Positive for arthralgias and joint swelling.   Skin:  Positive for color change and wound.   Neurological:  Positive for numbness.   All other systems reviewed and are negative.      Objective:      Vitals:    11/30/23 1540   BP: 119/68   BP Location: Right arm   Patient Position: Sitting   Pulse: 104   Weight: 56.2 kg (124 lb)   Height: 5' 1" (1.549 m)     Physical Exam  Vitals and nursing note reviewed.   Constitutional:       Appearance: Normal appearance.   Cardiovascular:      Pulses:           Dorsalis pedis pulses are 2+ on the right side and 2+ on the left side.        Posterior tibial pulses are 1+ on the right side and 1+ on the left side.   Pulmonary:      Effort: Pulmonary effort is normal.   Musculoskeletal:         General: Swelling and tenderness present.      Left foot: Deformity and bunion present.        Feet:    Feet:      Right foot:      Protective Sensation: 4 sites tested.   1 site sensed.     Left foot:      Protective Sensation: 4 sites tested.   1 site sensed.     Skin integrity: Ulcer, skin breakdown, erythema, warmth and callus present.   Skin:     Findings: Erythema present.   Neurological:      Mental Status: She is alert.      Sensory: Sensory deficit present.   Psychiatric:         Mood and Affect: Mood normal.         Behavior: Behavior normal.                                                                                 New digital images were not able to be attached to the patient's chart due to an [employer] system error.                                                                   Assessment:       1. Ulcer of left foot with fat layer exposed    2. Type II diabetes mellitus with neurological manifestations    3. Comprehensive diabetic foot examination, type 2 DM, encounter for    4. Tobacco use    5. Hallux valgus of left foot          Plan:       Patient presents today follow-up on ulceration left foot secondary to severe bunion deformity with dislocation of the 1st MPJ left.  "   On evaluation patient is still developing a large area of callus tissue overlying the medial eminence and plantar aspect of the 1st MPJ left.  Patient still has a small linear ulceration that is approximately 0.8 cm long by 5 mm wide by 3 mm deep there is mild drainage noted at the base of the ulcer I did non excisionally debride the large area of callus tissue there was no active signs of infection in this area and there is healthy underlying tissue present.  Patient's wound sites are looking a lot better she is been using the Ioplex central in the Wound an ulcer site itself which she states she believes is helping quite a bit because there has been a lot less drainage and the areas considerably dried out.  Patient continues to cover the larger area with Xeroform to keep this from getting too callused and the area is much softer it was much more easier to debride the wound itself has gotten smaller I advised the patient we are definitely on the right road the large metatarsal pad that I put in her shoe she is tolerating very well and I do feel that this is likely helped also.  I do plan to follow up with the patient in 3 weeks unless she has any problems questions or concerns sooner.  This note was created using [employer] voice recognition software that occasionally misinterpreted phrases or words.

## 2023-12-21 ENCOUNTER — OFFICE VISIT (OUTPATIENT)
Dept: PODIATRY | Facility: CLINIC | Age: 58
End: 2023-12-21
Payer: COMMERCIAL

## 2023-12-21 VITALS
BODY MASS INDEX: 23.41 KG/M2 | SYSTOLIC BLOOD PRESSURE: 123 MMHG | HEIGHT: 61 IN | WEIGHT: 124 LBS | HEART RATE: 105 BPM | DIASTOLIC BLOOD PRESSURE: 74 MMHG

## 2023-12-21 DIAGNOSIS — E11.49 TYPE II DIABETES MELLITUS WITH NEUROLOGICAL MANIFESTATIONS: ICD-10-CM

## 2023-12-21 DIAGNOSIS — E11.9 COMPREHENSIVE DIABETIC FOOT EXAMINATION, TYPE 2 DM, ENCOUNTER FOR: ICD-10-CM

## 2023-12-21 DIAGNOSIS — L97.522 ULCER OF LEFT FOOT WITH FAT LAYER EXPOSED: Primary | ICD-10-CM

## 2023-12-21 DIAGNOSIS — Z72.0 TOBACCO USE: ICD-10-CM

## 2023-12-21 PROCEDURE — 99999 PR PBB SHADOW E&M-EST. PATIENT-LVL IV: CPT | Mod: PBBFAC,,, | Performed by: PODIATRIST

## 2023-12-21 PROCEDURE — 3078F PR MOST RECENT DIASTOLIC BLOOD PRESSURE < 80 MM HG: ICD-10-PCS | Mod: CPTII,S$GLB,, | Performed by: PODIATRIST

## 2023-12-21 PROCEDURE — 99214 OFFICE O/P EST MOD 30 MIN: CPT | Mod: S$GLB,,, | Performed by: PODIATRIST

## 2023-12-21 PROCEDURE — 1159F PR MEDICATION LIST DOCUMENTED IN MEDICAL RECORD: ICD-10-PCS | Mod: CPTII,S$GLB,, | Performed by: PODIATRIST

## 2023-12-21 PROCEDURE — 1160F PR REVIEW ALL MEDS BY PRESCRIBER/CLIN PHARMACIST DOCUMENTED: ICD-10-PCS | Mod: CPTII,S$GLB,, | Performed by: PODIATRIST

## 2023-12-21 PROCEDURE — 3078F DIAST BP <80 MM HG: CPT | Mod: CPTII,S$GLB,, | Performed by: PODIATRIST

## 2023-12-21 PROCEDURE — 99999 PR PBB SHADOW E&M-EST. PATIENT-LVL IV: ICD-10-PCS | Mod: PBBFAC,,, | Performed by: PODIATRIST

## 2023-12-21 PROCEDURE — 99214 PR OFFICE/OUTPT VISIT, EST, LEVL IV, 30-39 MIN: ICD-10-PCS | Mod: S$GLB,,, | Performed by: PODIATRIST

## 2023-12-21 PROCEDURE — 3008F PR BODY MASS INDEX (BMI) DOCUMENTED: ICD-10-PCS | Mod: CPTII,S$GLB,, | Performed by: PODIATRIST

## 2023-12-21 PROCEDURE — 3008F BODY MASS INDEX DOCD: CPT | Mod: CPTII,S$GLB,, | Performed by: PODIATRIST

## 2023-12-21 PROCEDURE — 1159F MED LIST DOCD IN RCRD: CPT | Mod: CPTII,S$GLB,, | Performed by: PODIATRIST

## 2023-12-21 PROCEDURE — 1160F RVW MEDS BY RX/DR IN RCRD: CPT | Mod: CPTII,S$GLB,, | Performed by: PODIATRIST

## 2023-12-21 PROCEDURE — 3074F PR MOST RECENT SYSTOLIC BLOOD PRESSURE < 130 MM HG: ICD-10-PCS | Mod: CPTII,S$GLB,, | Performed by: PODIATRIST

## 2023-12-21 PROCEDURE — 3074F SYST BP LT 130 MM HG: CPT | Mod: CPTII,S$GLB,, | Performed by: PODIATRIST

## 2023-12-25 NOTE — PROGRESS NOTES
Subjective:       Patient ID: No Munoz is a 58 y.o. female.    Chief Complaint: Foot Ulcer  Patient presents for follow-up ulceration left foot.  Past Medical History:   Diagnosis Date    Diabetes mellitus     Diabetes mellitus, type 2     Hyperlipidemia     Hypertension     Myocardial infarction      Past Surgical History:   Procedure Laterality Date    CORONARY ANGIOGRAPHY N/A 2022    Procedure: Left heart cath;  Surgeon: Rohith Guajardo MD;  Location: [facility] CATH/EP LAB;  Service: Cardiology;  Laterality: N/A;     Family History   Problem Relation Age of Onset    Heart disease Mother     Stroke Mother     Diabetes Mother     Heart disease Father     Diabetes Father      Social History     Socioeconomic History    Marital status: Single   Tobacco Use    Smoking status: Former     Current packs/day: 1.00     Types: Cigarettes    Smokeless tobacco: Never    Tobacco comments:     Quit 2022   Substance and Sexual Activity    Alcohol use: Never    Drug use: Never       Current Outpatient Medications   Medication Sig Dispense Refill    aspirin (ECOTRIN) 81 MG EC tablet Take 81 mg by mouth once daily.      atorvastatin (LIPITOR) 40 MG tablet Take by mouth.      clopidogreL (PLAVIX) 75 mg tablet Take 75 mg by mouth.      JARDIANCE 10 mg tablet Take 10 mg by mouth every morning.      metFORMIN (GLUCOPHAGE-XR) 500 MG ER 24hr tablet Take 1,000 mg by mouth 2 (two) times daily.      metoprolol tartrate (LOPRESSOR) 25 MG tablet Take 12.5 mg by mouth 2 (two) times daily.      REPATHA SURECLICK 140 mg/mL PnIj SMARTSI Milligram(s) SUB-Q Every 2 Weeks      semaglutide (OZEMPIC) 0.25 mg or 0.5 mg (2 mg/3 mL) pen injector 0.25 mg.       No current facility-administered medications for this visit.     Review of patient's allergies indicates:   Allergen Reactions    Antihistamines - alkylamine     Sulfa (sulfonamide antibiotics)      Facial swelling       Review of Systems   Musculoskeletal:  Positive  "for joint swelling.   Skin:  Positive for color change and wound.   Neurological:  Positive for numbness.   All other systems reviewed and are negative.      Objective:      Vitals:    12/21/23 1603   BP: 123/74   BP Location: Right arm   Patient Position: Sitting   Pulse: 105   Weight: 56.2 kg (124 lb)   Height: 5' 1" (1.549 m)     Physical Exam  Vitals and nursing note reviewed.   Constitutional:       Appearance: Normal appearance.   Cardiovascular:      Pulses:           Dorsalis pedis pulses are 2+ on the right side and 2+ on the left side.        Posterior tibial pulses are 1+ on the right side and 1+ on the left side.   Pulmonary:      Effort: Pulmonary effort is normal.   Musculoskeletal:         General: Swelling and tenderness present.      Left foot: Deformity and bunion present.        Feet:    Feet:      Right foot:      Protective Sensation: 4 sites tested.   1 site sensed.     Left foot:      Protective Sensation: 4 sites tested.   1 site sensed.     Skin integrity: Ulcer, skin breakdown, erythema, warmth and callus present.   Skin:     Findings: Erythema present.   Neurological:      Mental Status: She is alert.      Sensory: Sensory deficit present.   Psychiatric:         Mood and Affect: Mood normal.         Behavior: Behavior normal.                                                                                                                                            Assessment:       1. Ulcer of left foot with fat layer exposed    2. Type II diabetes mellitus with neurological manifestations    3. Comprehensive diabetic foot examination, type 2 DM, encounter for    4. Tobacco use          Plan:       Patient presents today follow-up on ulceration left foot secondary to severe bunion deformity with dislocation of the 1st MPJ left.    On evaluation patient is still developing a large area of callus tissue overlying the medial eminence and plantar aspect of the 1st MPJ left.  Patient still has a " small linear ulceration that is approximately 0.7 cm long by 4 mm wide by 3 mm deep there is mild drainage noted at the base of the ulcer I did non excisionally debride the large area of callus tissue there was no active signs of infection in this area and there is healthy underlying tissue present.  Patient's wound sites are looking a lot better she is been using the Ioplex central in the Wound an ulcer site itself which she states she believes is helping quite a bit because there has been a lot less drainage and the areas considerably dried out.  Patient continues to cover the larger area with Xeroform to keep this from getting too callused and the area is much softer it was much more easier to debride the wound itself has gotten smaller I advised the patient we are definitely on the right road the large metatarsal pad that I put in her shoe she is tolerating very well and I do feel that this is likely helped also.  I am going to submit a request for the patient's insurance to approve the application of either PuraPly or Apligraf skin substitute to facilitate closure of the wound site.  Patient is going to continue to clean the wound daily with Dakin solution she will apply Xeroform 1 day Iopllex the next day alternating these 2 treatments to ensure that the wound does not become too moist or too dry and to try to help keep a healthy balance and facilitate wound healing.  Certainly the patient's bunion deformity is affecting the ability to heal this ulceration and pressure over the site this in combination with the patient's tobacco use diabetes and her job which requires her to stand 40 hours per week are all contributing factors.  I did advised the patient the area does look better and is slowly improving it is just taking an extended amount of time and I feel the patient would benefit from a skin substitute if her insurance will approve this.  I do plan to follow up with the patient in 3 weeks unless she has  any problems questions or concerns sooner.  This note was created using [employer] voice recognition software that occasionally misinterpreted phrases or words.

## 2024-01-02 ENCOUNTER — TELEPHONE (OUTPATIENT)
Dept: PODIATRY | Facility: CLINIC | Age: 59
End: 2024-01-02
Payer: COMMERCIAL

## 2024-01-11 ENCOUNTER — OFFICE VISIT (OUTPATIENT)
Dept: PODIATRY | Facility: CLINIC | Age: 59
End: 2024-01-11
Payer: COMMERCIAL

## 2024-01-11 VITALS
HEIGHT: 61 IN | WEIGHT: 124 LBS | SYSTOLIC BLOOD PRESSURE: 127 MMHG | DIASTOLIC BLOOD PRESSURE: 80 MMHG | HEART RATE: 99 BPM | BODY MASS INDEX: 23.41 KG/M2

## 2024-01-11 DIAGNOSIS — E11.9 COMPREHENSIVE DIABETIC FOOT EXAMINATION, TYPE 2 DM, ENCOUNTER FOR: ICD-10-CM

## 2024-01-11 DIAGNOSIS — M20.12 HALLUX VALGUS OF LEFT FOOT: ICD-10-CM

## 2024-01-11 DIAGNOSIS — E11.49 TYPE II DIABETES MELLITUS WITH NEUROLOGICAL MANIFESTATIONS: ICD-10-CM

## 2024-01-11 DIAGNOSIS — L97.522 ULCER OF LEFT FOOT WITH FAT LAYER EXPOSED: Primary | ICD-10-CM

## 2024-01-11 PROCEDURE — 15275 SKIN SUB GRAFT FACE/NK/HF/G: CPT | Mod: LT,S$GLB,, | Performed by: PODIATRIST

## 2024-01-11 PROCEDURE — 99213 OFFICE O/P EST LOW 20 MIN: CPT | Mod: 25,S$GLB,, | Performed by: PODIATRIST

## 2024-01-11 PROCEDURE — 3079F DIAST BP 80-89 MM HG: CPT | Mod: CPTII,S$GLB,, | Performed by: PODIATRIST

## 2024-01-11 PROCEDURE — 3008F BODY MASS INDEX DOCD: CPT | Mod: CPTII,S$GLB,, | Performed by: PODIATRIST

## 2024-01-11 PROCEDURE — 3074F SYST BP LT 130 MM HG: CPT | Mod: CPTII,S$GLB,, | Performed by: PODIATRIST

## 2024-01-11 PROCEDURE — 99999 PR PBB SHADOW E&M-EST. PATIENT-LVL IV: CPT | Mod: PBBFAC,,, | Performed by: PODIATRIST

## 2024-01-11 PROCEDURE — 1160F RVW MEDS BY RX/DR IN RCRD: CPT | Mod: CPTII,S$GLB,, | Performed by: PODIATRIST

## 2024-01-11 PROCEDURE — 1159F MED LIST DOCD IN RCRD: CPT | Mod: CPTII,S$GLB,, | Performed by: PODIATRIST

## 2024-01-15 NOTE — PROGRESS NOTES
Subjective:       Patient ID: No Munoz is a 58 y.o. female.    Chief Complaint: Foot Ulcer  Patient presents for follow-up ulceration left foot.  Past Medical History:   Diagnosis Date    Diabetes mellitus     Diabetes mellitus, type 2     Hyperlipidemia     Hypertension     Myocardial infarction      Past Surgical History:   Procedure Laterality Date    CORONARY ANGIOGRAPHY N/A 2022    Procedure: Left heart cath;  Surgeon: Rohith Guajardo MD;  Location: [facility] CATH/EP LAB;  Service: Cardiology;  Laterality: N/A;     Family History   Problem Relation Age of Onset    Heart disease Mother     Stroke Mother     Diabetes Mother     Heart disease Father     Diabetes Father      Social History     Socioeconomic History    Marital status: Single   Tobacco Use    Smoking status: Former     Current packs/day: 1.00     Types: Cigarettes    Smokeless tobacco: Never    Tobacco comments:     Quit 2022   Substance and Sexual Activity    Alcohol use: Never    Drug use: Never       Current Outpatient Medications   Medication Sig Dispense Refill    aspirin (ECOTRIN) 81 MG EC tablet Take 81 mg by mouth once daily.      atorvastatin (LIPITOR) 40 MG tablet Take by mouth.      clopidogreL (PLAVIX) 75 mg tablet Take 75 mg by mouth.      JARDIANCE 10 mg tablet Take 10 mg by mouth every morning.      metFORMIN (GLUCOPHAGE-XR) 500 MG ER 24hr tablet Take 1,000 mg by mouth 2 (two) times daily.      metoprolol tartrate (LOPRESSOR) 25 MG tablet Take 12.5 mg by mouth 2 (two) times daily.      REPATHA SURECLICK 140 mg/mL PnIj SMARTSI Milligram(s) SUB-Q Every 2 Weeks      semaglutide (OZEMPIC) 0.25 mg or 0.5 mg (2 mg/3 mL) pen injector 0.25 mg.       No current facility-administered medications for this visit.     Review of patient's allergies indicates:   Allergen Reactions    Antihistamines - alkylamine     Sulfa (sulfonamide antibiotics)      Facial swelling       Review of Systems   Musculoskeletal:  Positive  "for joint swelling.   Skin:  Positive for color change and wound.   Neurological:  Positive for numbness.   All other systems reviewed and are negative.      Objective:      Vitals:    01/11/24 1617   BP: 127/80   BP Location: Right arm   Patient Position: Sitting   Pulse: 99   Weight: 56.2 kg (124 lb)   Height: 5' 1" (1.549 m)     Physical Exam  Vitals and nursing note reviewed.   Constitutional:       Appearance: Normal appearance.   Cardiovascular:      Pulses:           Dorsalis pedis pulses are 2+ on the right side and 2+ on the left side.        Posterior tibial pulses are 1+ on the right side and 1+ on the left side.   Pulmonary:      Effort: Pulmonary effort is normal.   Musculoskeletal:         General: Swelling and tenderness present.      Left foot: Deformity and bunion present.        Feet:    Feet:      Right foot:      Protective Sensation: 4 sites tested.   1 site sensed.     Left foot:      Protective Sensation: 4 sites tested.   1 site sensed.     Skin integrity: Ulcer, skin breakdown, erythema, warmth and callus present.   Skin:     Findings: Erythema present.   Neurological:      Mental Status: She is alert.      Sensory: Sensory deficit present.   Psychiatric:         Mood and Affect: Mood normal.         Behavior: Behavior normal.                        Assessment:       1. Ulcer of left foot with fat layer exposed    2. Type II diabetes mellitus with neurological manifestations    3. Comprehensive diabetic foot examination, type 2 DM, encounter for    4. Hallux valgus of left foot          Plan:       Patient presents today follow-up on ulceration left foot secondary to severe bunion deformity with dislocation of the 1st MPJ left.    On evaluation patient is still developing a large area of callus tissue overlying the medial eminence and plantar aspect of the 1st MPJ left.  Patient still has a small linear ulceration that is approximately 1.0 cm long by 6 mm wide by 4 mm deep there is mild " drainage noted at the base of the ulcer I did excisionally debride the large area of callus tissue there was no active signs of infection in this area and there is healthy underlying tissue present.  Patient does have a lot of maceration she has a larger linear a linear area that is superior to the smaller chronic ulceration this was debrided also removing nonviable skin and tissue painted with Betadine to dry it out the entire area was prepped with a P cm X scrub prior to application of the PuraPly which has been prior authorized to by the patient's insurance company.  Patient will be seen for follow-up in a proximally 5 days she has not to change the dressing between now and then so that the PuraPly can stay in place.  The superior area of breakdown is more of a fissure than it is an ulcer it is a proximally 2 cm long by 3 mm wide with minimal depth.  This note was created using [employer] voice recognition software that occasionally misinterpreted phrases or words.     The patient presents today for application of Puraply to the medial aspect of the patient's left foot this has been previously discussed with the patient was discussed in detail all aspects of the procedure were discussed all potential complications including but not limited to delayed healing nonhealing postoperative pain infection as well as delayed healing and nerve damage were discussed in detail patient signed a consent form agreeing to application of Puraply skin substitute. After the patient had signed a consent form the area was thoroughly cleansed with and prepped and draped in the usual sterile manner following this a sharp excisional debridement was undertaken to prep the base of the ulceration and to elicit bleeding in the region and remove all nonviable tissue from the ulcer site. Following this Puraply was applied according to  guidelines. The entire 2 cm x 2 cm piece of Puraply was used. Telfa non-adhesive dressing was  directly applied overlying the puraply and a well bolstered dry sterile dressing was then applied applied over the puraply site. Patient was advised to keep the dressing dry and intact to keep as much weight as possible off of the involved lower extremity and that we we will change the patient's dressing on her first postoperative visit. Patient advised to contact us if they have any questions problems or concerns prior to his F/U visit.                            normal

## 2024-01-16 ENCOUNTER — OFFICE VISIT (OUTPATIENT)
Dept: PODIATRY | Facility: CLINIC | Age: 59
End: 2024-01-16
Payer: COMMERCIAL

## 2024-01-16 VITALS
HEIGHT: 61 IN | WEIGHT: 124 LBS | DIASTOLIC BLOOD PRESSURE: 73 MMHG | BODY MASS INDEX: 23.41 KG/M2 | SYSTOLIC BLOOD PRESSURE: 104 MMHG | HEART RATE: 102 BPM

## 2024-01-16 DIAGNOSIS — E11.49 TYPE II DIABETES MELLITUS WITH NEUROLOGICAL MANIFESTATIONS: ICD-10-CM

## 2024-01-16 DIAGNOSIS — L97.522 ULCER OF LEFT FOOT WITH FAT LAYER EXPOSED: Primary | ICD-10-CM

## 2024-01-16 DIAGNOSIS — M20.12 HALLUX VALGUS OF LEFT FOOT: ICD-10-CM

## 2024-01-16 DIAGNOSIS — E11.9 COMPREHENSIVE DIABETIC FOOT EXAMINATION, TYPE 2 DM, ENCOUNTER FOR: ICD-10-CM

## 2024-01-16 PROCEDURE — 1160F RVW MEDS BY RX/DR IN RCRD: CPT | Mod: CPTII,S$GLB,, | Performed by: PODIATRIST

## 2024-01-16 PROCEDURE — 3078F DIAST BP <80 MM HG: CPT | Mod: CPTII,S$GLB,, | Performed by: PODIATRIST

## 2024-01-16 PROCEDURE — 3074F SYST BP LT 130 MM HG: CPT | Mod: CPTII,S$GLB,, | Performed by: PODIATRIST

## 2024-01-16 PROCEDURE — 1159F MED LIST DOCD IN RCRD: CPT | Mod: CPTII,S$GLB,, | Performed by: PODIATRIST

## 2024-01-16 PROCEDURE — 99999 PR PBB SHADOW E&M-EST. PATIENT-LVL III: CPT | Mod: PBBFAC,,, | Performed by: PODIATRIST

## 2024-01-16 PROCEDURE — 99213 OFFICE O/P EST LOW 20 MIN: CPT | Mod: S$GLB,,, | Performed by: PODIATRIST

## 2024-01-16 PROCEDURE — 3008F BODY MASS INDEX DOCD: CPT | Mod: CPTII,S$GLB,, | Performed by: PODIATRIST

## 2024-01-19 NOTE — PROGRESS NOTES
Subjective:       Patient ID: No Munoz is a 58 y.o. female.    Chief Complaint: Foot Ulcer  Patient presents for follow-up ulceration left foot.  Past Medical History:   Diagnosis Date    Diabetes mellitus     Diabetes mellitus, type 2     Hyperlipidemia     Hypertension     Myocardial infarction      Past Surgical History:   Procedure Laterality Date    CORONARY ANGIOGRAPHY N/A 2022    Procedure: Left heart cath;  Surgeon: Rohith Guajardo MD;  Location: [facility] CATH/EP LAB;  Service: Cardiology;  Laterality: N/A;     Family History   Problem Relation Age of Onset    Heart disease Mother     Stroke Mother     Diabetes Mother     Heart disease Father     Diabetes Father      Social History     Socioeconomic History    Marital status: Single   Tobacco Use    Smoking status: Former     Current packs/day: 1.00     Types: Cigarettes    Smokeless tobacco: Never    Tobacco comments:     Quit 2022   Substance and Sexual Activity    Alcohol use: Never    Drug use: Never       Current Outpatient Medications   Medication Sig Dispense Refill    aspirin (ECOTRIN) 81 MG EC tablet Take 81 mg by mouth once daily.      atorvastatin (LIPITOR) 40 MG tablet Take by mouth.      clopidogreL (PLAVIX) 75 mg tablet Take 75 mg by mouth.      JARDIANCE 10 mg tablet Take 10 mg by mouth every morning.      metFORMIN (GLUCOPHAGE-XR) 500 MG ER 24hr tablet Take 1,000 mg by mouth 2 (two) times daily.      metoprolol tartrate (LOPRESSOR) 25 MG tablet Take 12.5 mg by mouth 2 (two) times daily.      REPATHA SURECLICK 140 mg/mL PnIj SMARTSI Milligram(s) SUB-Q Every 2 Weeks      semaglutide (OZEMPIC) 0.25 mg or 0.5 mg (2 mg/3 mL) pen injector 0.25 mg.       No current facility-administered medications for this visit.     Review of patient's allergies indicates:   Allergen Reactions    Antihistamines - alkylamine     Sulfa (sulfonamide antibiotics)      Facial swelling       Review of Systems   Musculoskeletal:  Positive  "for joint swelling.   Skin:  Positive for color change and wound.   Neurological:  Positive for numbness.   All other systems reviewed and are negative.      Objective:      Vitals:    01/16/24 1601   BP: 104/73   Pulse: 102   Weight: 56.2 kg (124 lb)   Height: 5' 1" (1.549 m)     Physical Exam  Vitals and nursing note reviewed.   Constitutional:       Appearance: Normal appearance.   Cardiovascular:      Pulses:           Dorsalis pedis pulses are 2+ on the right side and 2+ on the left side.        Posterior tibial pulses are 1+ on the right side and 1+ on the left side.   Pulmonary:      Effort: Pulmonary effort is normal.   Musculoskeletal:         General: Swelling and tenderness present.      Left foot: Deformity and bunion present.        Feet:    Feet:      Right foot:      Protective Sensation: 4 sites tested.   1 site sensed.     Left foot:      Protective Sensation: 4 sites tested.   1 site sensed.     Skin integrity: Ulcer, skin breakdown, erythema, warmth and callus present.   Skin:     Findings: Erythema present.   Neurological:      Mental Status: She is alert.      Sensory: Sensory deficit present.   Psychiatric:         Mood and Affect: Mood normal.         Behavior: Behavior normal.                        Assessment:       1. Ulcer of left foot with fat layer exposed    2. Type II diabetes mellitus with neurological manifestations    3. Comprehensive diabetic foot examination, type 2 DM, encounter for    4. Hallux valgus of left foot          Plan:       Patient presents today follow-up on ulceration left foot secondary to severe bunion deformity with dislocation of the 1st MPJ left.    Patient previous had PuraPly applied to the smaller of the area of ulceration on the patient's left 1st MPJ.  Patient has kept the dressing dry and intact upon removal of the dressing today the area does look better some of the PuraPly still present that was tucked into the ulcer site this area looks good it appears " to be healing and getting subsequently smaller the fissured area that is superior to the smaller ulceration also appears stable I have advised the patient I am going to have her clean this with Dakin solution every day apply a small strip of silver alginate to the fissured area and cover the entire area with Xeroform to keep it from drying out too much and to prevent callus buildup cover with a well-padded dressing I plan to follow up with her in 2 weeks there has no signs of infection noted the area show signs of improvement certainly if she has any problems questions or concerns prior to follow-up she will contact us immediately.  This note was created using [employer] voice recognition software that occasionally misinterpreted phrases or words.

## 2024-02-01 ENCOUNTER — OFFICE VISIT (OUTPATIENT)
Dept: PODIATRY | Facility: CLINIC | Age: 59
End: 2024-02-01
Payer: COMMERCIAL

## 2024-02-01 VITALS
HEIGHT: 61 IN | BODY MASS INDEX: 23.41 KG/M2 | HEART RATE: 110 BPM | SYSTOLIC BLOOD PRESSURE: 123 MMHG | DIASTOLIC BLOOD PRESSURE: 78 MMHG | WEIGHT: 124 LBS

## 2024-02-01 DIAGNOSIS — L97.522 ULCER OF LEFT FOOT WITH FAT LAYER EXPOSED: Primary | ICD-10-CM

## 2024-02-01 DIAGNOSIS — M20.12 HALLUX VALGUS OF LEFT FOOT: ICD-10-CM

## 2024-02-01 DIAGNOSIS — E11.9 COMPREHENSIVE DIABETIC FOOT EXAMINATION, TYPE 2 DM, ENCOUNTER FOR: ICD-10-CM

## 2024-02-01 DIAGNOSIS — E11.49 TYPE II DIABETES MELLITUS WITH NEUROLOGICAL MANIFESTATIONS: ICD-10-CM

## 2024-02-01 PROCEDURE — 1159F MED LIST DOCD IN RCRD: CPT | Mod: CPTII,S$GLB,, | Performed by: PODIATRIST

## 2024-02-01 PROCEDURE — 3074F SYST BP LT 130 MM HG: CPT | Mod: CPTII,S$GLB,, | Performed by: PODIATRIST

## 2024-02-01 PROCEDURE — 3008F BODY MASS INDEX DOCD: CPT | Mod: CPTII,S$GLB,, | Performed by: PODIATRIST

## 2024-02-01 PROCEDURE — 1160F RVW MEDS BY RX/DR IN RCRD: CPT | Mod: CPTII,S$GLB,, | Performed by: PODIATRIST

## 2024-02-01 PROCEDURE — 99999 PR PBB SHADOW E&M-EST. PATIENT-LVL IV: CPT | Mod: PBBFAC,,, | Performed by: PODIATRIST

## 2024-02-01 PROCEDURE — 3078F DIAST BP <80 MM HG: CPT | Mod: CPTII,S$GLB,, | Performed by: PODIATRIST

## 2024-02-01 PROCEDURE — 99213 OFFICE O/P EST LOW 20 MIN: CPT | Mod: S$GLB,,, | Performed by: PODIATRIST

## 2024-02-04 NOTE — PROGRESS NOTES
Subjective:       Patient ID: No Munoz is a 58 y.o. female.    Chief Complaint: Foot Ulcer  Patient presents for follow-up ulceration left foot.  Patient is a diabetic and has a severe bunion deformity which contribute to the patient's area of ulceration left foot.  Past Medical History:   Diagnosis Date    Diabetes mellitus     Diabetes mellitus, type 2     Hyperlipidemia     Hypertension     Myocardial infarction      Past Surgical History:   Procedure Laterality Date    CORONARY ANGIOGRAPHY N/A 2022    Procedure: Left heart cath;  Surgeon: Rohith Guajardo MD;  Location: [facility] CATH/EP LAB;  Service: Cardiology;  Laterality: N/A;     Family History   Problem Relation Age of Onset    Heart disease Mother     Stroke Mother     Diabetes Mother     Heart disease Father     Diabetes Father      Social History     Socioeconomic History    Marital status: Single   Tobacco Use    Smoking status: Former     Current packs/day: 1.00     Types: Cigarettes    Smokeless tobacco: Never    Tobacco comments:     Quit 2022   Substance and Sexual Activity    Alcohol use: Never    Drug use: Never       Current Outpatient Medications   Medication Sig Dispense Refill    aspirin (ECOTRIN) 81 MG EC tablet Take 81 mg by mouth once daily.      atorvastatin (LIPITOR) 40 MG tablet Take by mouth.      clopidogreL (PLAVIX) 75 mg tablet Take 75 mg by mouth.      JARDIANCE 10 mg tablet Take 10 mg by mouth every morning.      metFORMIN (GLUCOPHAGE-XR) 500 MG ER 24hr tablet Take 1,000 mg by mouth 2 (two) times daily.      metoprolol tartrate (LOPRESSOR) 25 MG tablet Take 12.5 mg by mouth 2 (two) times daily.      REPATHA SURECLICK 140 mg/mL PnIj SMARTSI Milligram(s) SUB-Q Every 2 Weeks      semaglutide (OZEMPIC) 0.25 mg or 0.5 mg (2 mg/3 mL) pen injector 0.25 mg.       No current facility-administered medications for this visit.     Review of patient's allergies indicates:   Allergen Reactions    Antihistamines -  "alkylamine     Sulfa (sulfonamide antibiotics)      Facial swelling       Review of Systems   Musculoskeletal:  Positive for joint swelling.   Skin:  Positive for color change and wound.   Neurological:  Positive for numbness.   All other systems reviewed and are negative.      Objective:      Vitals:    02/01/24 1607   BP: 123/78   BP Location: Right arm   Patient Position: Sitting   Pulse: 110   Weight: 56.2 kg (124 lb)   Height: 5' 1" (1.549 m)     Physical Exam  Vitals and nursing note reviewed.   Constitutional:       Appearance: Normal appearance.   Cardiovascular:      Pulses:           Dorsalis pedis pulses are 2+ on the right side and 2+ on the left side.        Posterior tibial pulses are 1+ on the right side and 1+ on the left side.   Pulmonary:      Effort: Pulmonary effort is normal.   Musculoskeletal:         General: Swelling and deformity present.      Left foot: Deformity and bunion present.        Feet:    Feet:      Right foot:      Protective Sensation: 4 sites tested.   1 site sensed.     Left foot:      Protective Sensation: 4 sites tested.   1 site sensed.     Skin integrity: Ulcer, skin breakdown and callus present.   Skin:     General: Skin is warm.      Findings: Lesion present.   Neurological:      Mental Status: She is alert.      Sensory: Sensory deficit present.   Psychiatric:         Mood and Affect: Mood normal.         Behavior: Behavior normal.                                          Assessment:       1. Ulcer of left foot with fat layer exposed    2. Hallux valgus of left foot    3. Type II diabetes mellitus with neurological manifestations    4. Comprehensive diabetic foot examination, type 2 DM, encounter for    5. Tobacco use          Plan:       Patient presents today follow-up on ulceration left foot secondary to severe bunion deformity with dislocation of the 1st MPJ left.    Patient previously had PuraPly applied to the smaller ulceration site on the patient's left foot " this looks dramatically better today on evaluation there was some callus tissue and scab overlying this area following debridement the area is healed up and sealed over with just a very tiny linear opening there has no signs of infection in this area patient also had a large fissure above this area that has considerably decreased in size.  The fissured areas a proximally 1.2 cm long by 7 mm wide by 3 mm deep it does not appear to be infected this is half the size it was previously all of these areas are looking better I did perform debridement on the ulceration site healthy new pink skin and tissue has filled in areas of previously noted nonviable tissue.  Patient relates her most recent A1c was 7.2.  Patient advised these areas look considerably improved this is very encouraging this is the best her foot has looked in a very long time were going to continue with our wound care plan cleaning these areas with Dakin solution applying silver alginate to the small area of ulceration the now is just a small linear opening and a strip of silver alginate overlying the fissured area which is considerably smaller placing Xeroform over this to prevent the area and surrounding tissue from drying out and covering with a well-padded dressing.  Patient will continue changing this every day she is going to keep the area dry and clean there has no signs of infection noted patient did not need an antibiotic at this time I do plan to follow up with her in 2 weeks and certainly the patient understands any problems questions or concerns prior to follow-up she is to contact us immediately.  Non excisional debridement performed today patient will continue to use her toe spacer to try to take pressure off of the medial aspect of the 1st MPJ left.  Patient advised clearly the use of the PuraPly was very helpful in getting the smaller of the ulcerations closed.  This note was created using [employer] voice recognition software that  occasionally misinterpreted phrases or words.

## 2024-02-05 ENCOUNTER — TELEPHONE (OUTPATIENT)
Dept: PODIATRY | Facility: CLINIC | Age: 59
End: 2024-02-05
Payer: COMMERCIAL

## 2024-02-15 ENCOUNTER — OFFICE VISIT (OUTPATIENT)
Dept: PODIATRY | Facility: CLINIC | Age: 59
End: 2024-02-15
Payer: COMMERCIAL

## 2024-02-15 VITALS
WEIGHT: 124 LBS | DIASTOLIC BLOOD PRESSURE: 84 MMHG | SYSTOLIC BLOOD PRESSURE: 120 MMHG | HEIGHT: 61 IN | HEART RATE: 105 BPM | BODY MASS INDEX: 23.41 KG/M2

## 2024-02-15 DIAGNOSIS — E11.9 COMPREHENSIVE DIABETIC FOOT EXAMINATION, TYPE 2 DM, ENCOUNTER FOR: ICD-10-CM

## 2024-02-15 DIAGNOSIS — M20.12 HALLUX VALGUS OF LEFT FOOT: ICD-10-CM

## 2024-02-15 DIAGNOSIS — E11.49 TYPE II DIABETES MELLITUS WITH NEUROLOGICAL MANIFESTATIONS: ICD-10-CM

## 2024-02-15 DIAGNOSIS — L97.522 ULCER OF LEFT FOOT WITH FAT LAYER EXPOSED: Primary | ICD-10-CM

## 2024-02-15 PROCEDURE — 3074F SYST BP LT 130 MM HG: CPT | Mod: CPTII,S$GLB,, | Performed by: PODIATRIST

## 2024-02-15 PROCEDURE — 99999 PR PBB SHADOW E&M-EST. PATIENT-LVL IV: CPT | Mod: PBBFAC,,, | Performed by: PODIATRIST

## 2024-02-15 PROCEDURE — 99213 OFFICE O/P EST LOW 20 MIN: CPT | Mod: S$GLB,,, | Performed by: PODIATRIST

## 2024-02-15 PROCEDURE — 1160F RVW MEDS BY RX/DR IN RCRD: CPT | Mod: CPTII,S$GLB,, | Performed by: PODIATRIST

## 2024-02-15 PROCEDURE — 3079F DIAST BP 80-89 MM HG: CPT | Mod: CPTII,S$GLB,, | Performed by: PODIATRIST

## 2024-02-15 PROCEDURE — 1159F MED LIST DOCD IN RCRD: CPT | Mod: CPTII,S$GLB,, | Performed by: PODIATRIST

## 2024-02-15 PROCEDURE — 3008F BODY MASS INDEX DOCD: CPT | Mod: CPTII,S$GLB,, | Performed by: PODIATRIST

## 2024-02-18 NOTE — PROGRESS NOTES
Subjective:       Patient ID: No Munoz is a 58 y.o. female.    Chief Complaint: Foot Ulcer (Left foot)  Patient presents for follow-up ulceration left foot.  Patient is a diabetic and has a severe bunion deformity which contribute to the patient's area of ulceration left foot.  Past Medical History:   Diagnosis Date    Diabetes mellitus     Diabetes mellitus, type 2     Hyperlipidemia     Hypertension     Myocardial infarction      Past Surgical History:   Procedure Laterality Date    CORONARY ANGIOGRAPHY N/A 2022    Procedure: Left heart cath;  Surgeon: Rohith Guajardo MD;  Location: [facility] CATH/EP LAB;  Service: Cardiology;  Laterality: N/A;     Family History   Problem Relation Age of Onset    Heart disease Mother     Stroke Mother     Diabetes Mother     Heart disease Father     Diabetes Father      Social History     Socioeconomic History    Marital status: Single   Tobacco Use    Smoking status: Former     Current packs/day: 1.00     Types: Cigarettes    Smokeless tobacco: Never    Tobacco comments:     Quit 2022   Substance and Sexual Activity    Alcohol use: Never    Drug use: Never       Current Outpatient Medications   Medication Sig Dispense Refill    aspirin (ECOTRIN) 81 MG EC tablet Take 81 mg by mouth once daily.      atorvastatin (LIPITOR) 40 MG tablet Take by mouth.      clopidogreL (PLAVIX) 75 mg tablet Take 75 mg by mouth.      JARDIANCE 10 mg tablet Take 10 mg by mouth every morning.      metFORMIN (GLUCOPHAGE-XR) 500 MG ER 24hr tablet Take 1,000 mg by mouth 2 (two) times daily.      metoprolol tartrate (LOPRESSOR) 25 MG tablet Take 12.5 mg by mouth 2 (two) times daily.      REPATHA SURECLICK 140 mg/mL PnIj SMARTSI Milligram(s) SUB-Q Every 2 Weeks      semaglutide (OZEMPIC) 0.25 mg or 0.5 mg (2 mg/3 mL) pen injector 0.25 mg.       No current facility-administered medications for this visit.     Review of patient's allergies indicates:   Allergen Reactions     "Antihistamines - alkylamine     Sulfa (sulfonamide antibiotics)      Facial swelling       Review of Systems   Musculoskeletal:  Positive for joint swelling.   Skin:  Positive for color change and wound.   Neurological:  Positive for numbness.   All other systems reviewed and are negative.      Objective:      Vitals:    02/15/24 1530   BP: 120/84   BP Location: Right arm   Patient Position: Sitting   Pulse: 105   Weight: 56.2 kg (124 lb)   Height: 5' 1" (1.549 m)     Physical Exam  Vitals and nursing note reviewed.   Constitutional:       Appearance: Normal appearance.   Cardiovascular:      Pulses:           Dorsalis pedis pulses are 2+ on the right side and 2+ on the left side.        Posterior tibial pulses are 1+ on the right side and 1+ on the left side.   Pulmonary:      Effort: Pulmonary effort is normal.   Musculoskeletal:         General: Swelling and deformity present.      Left foot: Deformity and bunion present.        Feet:    Feet:      Right foot:      Protective Sensation: 4 sites tested.   1 site sensed.     Left foot:      Protective Sensation: 4 sites tested.   1 site sensed.     Skin integrity: Ulcer, skin breakdown and callus present.   Skin:     General: Skin is warm.      Findings: Lesion present.   Neurological:      Mental Status: She is alert.      Sensory: Sensory deficit present.   Psychiatric:         Mood and Affect: Mood normal.         Behavior: Behavior normal.                                                        Assessment:       1. Ulcer of left foot with fat layer exposed    2. Hallux valgus of left foot    3. Type II diabetes mellitus with neurological manifestations    4. Comprehensive diabetic foot examination, type 2 DM, encounter for          Plan:       Patient presents today follow-up on ulceration left foot secondary to severe bunion deformity with dislocation of the 1st MPJ left.  Patient's left foot looks considerably better overall this has improved dramatically each " of the past few visits.  The smallest ulceration site is still somewhat open however it is superficial it is currently 4 mm long by 2 mm wide by a proximally 2 mm deep it does not appear infected the large fissured area has improved dramatically it appears closed with only a very superficial area that is proximal.  I did advised the patient all of these areas look better I am going to have her continue to clean with Dakin solution she is going to gently tuck a very small piece of Promogran in the small ulceration on the plantar aspect of the bunion area where she has breakdown the fissure area we are going to continue applying a strip of silver alginate over this covering the whole area with Xeroform and a padded dressing this will be repeated daily there was very little that required debridement today I advised the patient I am going to let her go 3 weeks until I follow up with her certainly if she has any problems questions or concerns prior to her scheduled follow-up she will contact us immediately.  This note was created using [employer] voice recognition software that occasionally misinterpreted phrases or words.

## 2024-03-19 ENCOUNTER — OFFICE VISIT (OUTPATIENT)
Dept: PODIATRY | Facility: CLINIC | Age: 59
End: 2024-03-19
Payer: COMMERCIAL

## 2024-03-19 ENCOUNTER — TELEPHONE (OUTPATIENT)
Dept: PODIATRY | Facility: CLINIC | Age: 59
End: 2024-03-19
Payer: COMMERCIAL

## 2024-03-19 VITALS
BODY MASS INDEX: 23.39 KG/M2 | DIASTOLIC BLOOD PRESSURE: 64 MMHG | HEART RATE: 89 BPM | SYSTOLIC BLOOD PRESSURE: 107 MMHG | WEIGHT: 123.88 LBS | HEIGHT: 61 IN

## 2024-03-19 DIAGNOSIS — L97.522 ULCER OF LEFT FOOT WITH FAT LAYER EXPOSED: Primary | ICD-10-CM

## 2024-03-19 DIAGNOSIS — M20.12 HALLUX VALGUS OF LEFT FOOT: ICD-10-CM

## 2024-03-19 DIAGNOSIS — E11.49 TYPE II DIABETES MELLITUS WITH NEUROLOGICAL MANIFESTATIONS: ICD-10-CM

## 2024-03-19 DIAGNOSIS — E11.9 COMPREHENSIVE DIABETIC FOOT EXAMINATION, TYPE 2 DM, ENCOUNTER FOR: ICD-10-CM

## 2024-03-19 PROCEDURE — 1160F RVW MEDS BY RX/DR IN RCRD: CPT | Mod: CPTII,S$GLB,, | Performed by: PODIATRIST

## 2024-03-19 PROCEDURE — 3008F BODY MASS INDEX DOCD: CPT | Mod: CPTII,S$GLB,, | Performed by: PODIATRIST

## 2024-03-19 PROCEDURE — 3078F DIAST BP <80 MM HG: CPT | Mod: CPTII,S$GLB,, | Performed by: PODIATRIST

## 2024-03-19 PROCEDURE — 3074F SYST BP LT 130 MM HG: CPT | Mod: CPTII,S$GLB,, | Performed by: PODIATRIST

## 2024-03-19 PROCEDURE — 99213 OFFICE O/P EST LOW 20 MIN: CPT | Mod: S$GLB,,, | Performed by: PODIATRIST

## 2024-03-19 PROCEDURE — 1159F MED LIST DOCD IN RCRD: CPT | Mod: CPTII,S$GLB,, | Performed by: PODIATRIST

## 2024-03-19 PROCEDURE — 99999 PR PBB SHADOW E&M-EST. PATIENT-LVL IV: CPT | Mod: PBBFAC,,, | Performed by: PODIATRIST

## 2024-03-20 NOTE — PROGRESS NOTES
Subjective:       Patient ID: No Munoz is a 58 y.o. female.    Chief Complaint: Foot Ulcer (Left foot )  Patient presents for follow-up ulceration left foot.  Patient is a diabetic and has a severe bunion deformity which contribute to the patient's area of ulceration left foot.  Past Medical History:   Diagnosis Date    Diabetes mellitus     Diabetes mellitus, type 2     Hyperlipidemia     Hypertension     Myocardial infarction      Past Surgical History:   Procedure Laterality Date    CORONARY ANGIOGRAPHY N/A 2022    Procedure: Left heart cath;  Surgeon: Rohith Guajardo MD;  Location: [facility] CATH/EP LAB;  Service: Cardiology;  Laterality: N/A;     Family History   Problem Relation Age of Onset    Heart disease Mother     Stroke Mother     Diabetes Mother     Heart disease Father     Diabetes Father      Social History     Socioeconomic History    Marital status: Single   Tobacco Use    Smoking status: Former     Current packs/day: 1.00     Types: Cigarettes    Smokeless tobacco: Never    Tobacco comments:     Quit 2022   Substance and Sexual Activity    Alcohol use: Never    Drug use: Never       Current Outpatient Medications   Medication Sig Dispense Refill    aspirin (ECOTRIN) 81 MG EC tablet Take 81 mg by mouth once daily.      atorvastatin (LIPITOR) 40 MG tablet Take by mouth.      clopidogreL (PLAVIX) 75 mg tablet Take 75 mg by mouth.      JARDIANCE 10 mg tablet Take 10 mg by mouth every morning.      metFORMIN (GLUCOPHAGE-XR) 500 MG ER 24hr tablet Take 1,000 mg by mouth 2 (two) times daily.      metoprolol tartrate (LOPRESSOR) 25 MG tablet Take 12.5 mg by mouth 2 (two) times daily.      REPATHA SURECLICK 140 mg/mL PnIj SMARTSI Milligram(s) SUB-Q Every 2 Weeks      semaglutide (OZEMPIC) 0.25 mg or 0.5 mg (2 mg/3 mL) pen injector 0.25 mg.       No current facility-administered medications for this visit.     Review of patient's allergies indicates:   Allergen Reactions     "Antihistamines - alkylamine     Sulfa (sulfonamide antibiotics)      Facial swelling       Review of Systems   Musculoskeletal:  Positive for joint swelling.   Skin:  Positive for color change and wound.   Neurological:  Positive for numbness.   All other systems reviewed and are negative.      Objective:      Vitals:    03/19/24 1026   BP: 107/64   BP Location: Right arm   Patient Position: Sitting   Pulse: 89   Weight: 56.2 kg (123 lb 14.4 oz)   Height: 5' 1" (1.549 m)     Physical Exam  Vitals and nursing note reviewed.   Constitutional:       Appearance: Normal appearance.   Cardiovascular:      Pulses:           Dorsalis pedis pulses are 2+ on the right side and 2+ on the left side.        Posterior tibial pulses are 1+ on the right side and 1+ on the left side.   Pulmonary:      Effort: Pulmonary effort is normal.   Musculoskeletal:         General: Swelling and deformity present.      Left foot: Deformity and bunion present.        Feet:    Feet:      Right foot:      Protective Sensation: 4 sites tested.   1 site sensed.     Left foot:      Protective Sensation: 4 sites tested.   1 site sensed.     Skin integrity: Ulcer, skin breakdown and callus present.   Skin:     General: Skin is warm.      Findings: Lesion present.   Neurological:      Mental Status: She is alert.      Sensory: Sensory deficit present.   Psychiatric:         Mood and Affect: Mood normal.         Behavior: Behavior normal.                                                                                          Assessment:       1. Ulcer of left foot with fat layer exposed    2. Type II diabetes mellitus with neurological manifestations    3. Comprehensive diabetic foot examination, type 2 DM, encounter for    4. Hallux valgus of left foot          Plan:       Patient presents today follow-up on ulceration left foot secondary to severe bunion deformity with dislocation of the 1st MPJ left.  Patient's left foot looks considerably better " overall this has improved dramatically each of the past few visits.  The smallest ulceration site is still somewhat open however it is superficial it is currently 3 mm long by 1 mm wide by a proximally 2 mm deep it does not appear infected the large fissured area has improved dramatically it appears closed with only a very superficial area that is proximal.  I did advised the patient all of these areas look better I am going to have her continue to clean with Dakin solution she is going to gently tuck a very small piece of Promogran in the small ulceration on the plantar aspect of the bunion area and cover the whole area with Xeroform and a padded dressing this will be repeated daily there was very little that required debridement today I advised the patient I am going to let her go 4 weeks until I follow up with her certainly if she has any problems questions or concerns prior to her scheduled follow-up she will contact us immediately.  Patient was advised I am very encouraged how this looks it has continued to improve she has continued to use the toe spacers and I dispensed new toe spacers today the ones she was using his started to become somewhat flattened.  Patient is gently packing Promogran into the small wound site there are no current signs of infection.  This note was created using [employer] voice recognition software that occasionally misinterpreted phrases or words.

## 2024-03-28 ENCOUNTER — OFFICE VISIT (OUTPATIENT)
Dept: URGENT CARE | Facility: CLINIC | Age: 59
End: 2024-03-28
Payer: COMMERCIAL

## 2024-03-28 VITALS
DIASTOLIC BLOOD PRESSURE: 69 MMHG | TEMPERATURE: 99 F | BODY MASS INDEX: 23.56 KG/M2 | RESPIRATION RATE: 18 BRPM | HEIGHT: 60 IN | WEIGHT: 120 LBS | SYSTOLIC BLOOD PRESSURE: 112 MMHG | OXYGEN SATURATION: 96 % | HEART RATE: 124 BPM

## 2024-03-28 DIAGNOSIS — R52 BODY ACHES: ICD-10-CM

## 2024-03-28 DIAGNOSIS — R68.83 CHILLS: ICD-10-CM

## 2024-03-28 DIAGNOSIS — Z20.822 CLOSE EXPOSURE TO COVID-19 VIRUS: Primary | ICD-10-CM

## 2024-03-28 LAB
CTP QC/QA: YES
CTP QC/QA: YES
POC MOLECULAR INFLUENZA A AGN: NEGATIVE
POC MOLECULAR INFLUENZA B AGN: NEGATIVE
SARS-COV-2 AG RESP QL IA.RAPID: NEGATIVE

## 2024-03-28 PROCEDURE — 87502 INFLUENZA DNA AMP PROBE: CPT | Mod: QW,,, | Performed by: NURSE PRACTITIONER

## 2024-03-28 PROCEDURE — 87811 SARS-COV-2 COVID19 W/OPTIC: CPT | Mod: QW,S$GLB,, | Performed by: NURSE PRACTITIONER

## 2024-03-28 PROCEDURE — 99203 OFFICE O/P NEW LOW 30 MIN: CPT | Mod: S$GLB,,, | Performed by: NURSE PRACTITIONER

## 2024-03-28 NOTE — PATIENT INSTRUCTIONS
Please return here or go to the Emergency Department for any concerns or worsening of condition.  Please drink plenty of fluids.  Please get plenty of rest.  If not allergic, please take over the counter Tylenol (Acetaminophen) and/or Motrin (Ibuprofen) as directed for control of pain and/or fever.  Please follow up with your primary care doctor or specialist as needed.    If you  smoke, please stop smoking.

## 2024-03-28 NOTE — PROGRESS NOTES
Subjective:       Patient ID: No Munoz is a 58 y.o. female.    Vitals:  height is 5' (1.524 m) and weight is 54.4 kg (120 lb). Her oral temperature is 99.2 °F (37.3 °C). Her blood pressure is 112/69 and her pulse is 124 (abnormal). Her respiration is 18 and oxygen saturation is 96%.     Chief Complaint: Generalized Body Aches    This is a 58 y.o. female who presents today with a chief complaint of chills, body aches, and fatigue over the past day. Pt denies fever, headache or any other URI symptoms. .  Pt reports Exposure to Covid last week by family member.     Patient presents with:  Generalized Body Aches            Constitution: Positive for chills.   Musculoskeletal:  Positive for muscle ache.           Objective:      Physical Exam   Constitutional: She is oriented to person, place, and time. She appears well-developed. She is cooperative.  Non-toxic appearance. She does not appear ill. No distress.   HENT:   Head: Normocephalic and atraumatic.   Ears:   Right Ear: Hearing, tympanic membrane, external ear and ear canal normal.   Left Ear: Hearing, tympanic membrane, external ear and ear canal normal.   Nose: Nose normal. No mucosal edema, rhinorrhea or nasal deformity. No epistaxis. Right sinus exhibits no maxillary sinus tenderness and no frontal sinus tenderness. Left sinus exhibits no maxillary sinus tenderness and no frontal sinus tenderness.   Mouth/Throat: Uvula is midline, oropharynx is clear and moist and mucous membranes are normal. No trismus in the jaw. Normal dentition. No uvula swelling. No oropharyngeal exudate, posterior oropharyngeal edema or posterior oropharyngeal erythema.   Eyes: Conjunctivae and lids are normal. No scleral icterus.   Neck: Trachea normal and phonation normal. Neck supple. No edema present. No erythema present. No neck rigidity present.   Cardiovascular: Normal rate, regular rhythm, normal heart sounds and normal pulses.   Pulmonary/Chest: Effort normal and  breath sounds normal. No respiratory distress. She has no decreased breath sounds. She has no rhonchi.   Abdominal: Normal appearance.   Musculoskeletal: Normal range of motion.         General: No deformity. Normal range of motion.   Neurological: She is alert and oriented to person, place, and time. She exhibits normal muscle tone. Coordination normal.   Skin: Skin is warm, dry, intact, not diaphoretic and not pale.   Psychiatric: Her speech is normal and behavior is normal. Judgment and thought content normal.   Nursing note and vitals reviewed.        Past medical history and current medications reviewed.     Results for orders placed or performed in visit on 03/28/24   SARS Coronavirus 2 Antigen, POCT Manual Read   Result Value Ref Range    SARS Coronavirus 2 Antigen Negative Negative     Acceptable Yes    POCT Influenza A/B MOLECULAR   Result Value Ref Range    POC Molecular Influenza A Ag Negative Negative, Not Reported    POC Molecular Influenza B Ag Negative Negative, Not Reported     Acceptable Yes       Assessment:           1. Close exposure to COVID-19 virus    2. Chills    3. Body aches              Plan:         Close exposure to COVID-19 virus    Chills  -     SARS Coronavirus 2 Antigen, POCT Manual Read    Body aches  -     POCT Influenza A/B MOLECULAR             Patient Instructions   Please return here or go to the Emergency Department for any concerns or worsening of condition.  Please drink plenty of fluids.  Please get plenty of rest.  If not allergic, please take over the counter Tylenol (Acetaminophen) and/or Motrin (Ibuprofen) as directed for control of pain and/or fever.  Please follow up with your primary care doctor or specialist as needed.    If you  smoke, please stop smoking.           SUZIE Montes

## 2024-04-02 ENCOUNTER — OFFICE VISIT (OUTPATIENT)
Dept: PODIATRY | Facility: CLINIC | Age: 59
End: 2024-04-02
Payer: COMMERCIAL

## 2024-04-02 VITALS
DIASTOLIC BLOOD PRESSURE: 81 MMHG | HEART RATE: 92 BPM | WEIGHT: 119.94 LBS | SYSTOLIC BLOOD PRESSURE: 124 MMHG | BODY MASS INDEX: 23.55 KG/M2 | HEIGHT: 60 IN

## 2024-04-02 DIAGNOSIS — E11.49 TYPE II DIABETES MELLITUS WITH NEUROLOGICAL MANIFESTATIONS: ICD-10-CM

## 2024-04-02 DIAGNOSIS — L97.522 ULCER OF LEFT FOOT WITH FAT LAYER EXPOSED: Primary | ICD-10-CM

## 2024-04-02 DIAGNOSIS — E11.9 COMPREHENSIVE DIABETIC FOOT EXAMINATION, TYPE 2 DM, ENCOUNTER FOR: ICD-10-CM

## 2024-04-02 DIAGNOSIS — Z72.0 TOBACCO USE: ICD-10-CM

## 2024-04-02 PROCEDURE — 3074F SYST BP LT 130 MM HG: CPT | Mod: CPTII,S$GLB,, | Performed by: PODIATRIST

## 2024-04-02 PROCEDURE — 3079F DIAST BP 80-89 MM HG: CPT | Mod: CPTII,S$GLB,, | Performed by: PODIATRIST

## 2024-04-02 PROCEDURE — 1160F RVW MEDS BY RX/DR IN RCRD: CPT | Mod: CPTII,S$GLB,, | Performed by: PODIATRIST

## 2024-04-02 PROCEDURE — 3008F BODY MASS INDEX DOCD: CPT | Mod: CPTII,S$GLB,, | Performed by: PODIATRIST

## 2024-04-02 PROCEDURE — 1159F MED LIST DOCD IN RCRD: CPT | Mod: CPTII,S$GLB,, | Performed by: PODIATRIST

## 2024-04-02 PROCEDURE — 87070 CULTURE OTHR SPECIMN AEROBIC: CPT | Performed by: PODIATRIST

## 2024-04-02 PROCEDURE — 99999 PR PBB SHADOW E&M-EST. PATIENT-LVL IV: CPT | Mod: PBBFAC,,, | Performed by: PODIATRIST

## 2024-04-02 PROCEDURE — 99214 OFFICE O/P EST MOD 30 MIN: CPT | Mod: S$GLB,,, | Performed by: PODIATRIST

## 2024-04-05 ENCOUNTER — TELEPHONE (OUTPATIENT)
Dept: PODIATRY | Facility: CLINIC | Age: 59
End: 2024-04-05
Payer: COMMERCIAL

## 2024-04-05 LAB — BACTERIA SPEC AEROBE CULT: NORMAL

## 2024-04-06 NOTE — PROGRESS NOTES
Subjective:       Patient ID: oN Munoz is a 58 y.o. female.    Chief Complaint: Foot Ulcer and Wound Check  Patient presents for follow-up ulceration left foot.  Patient is a diabetic and has a severe bunion deformity which contribute to the patient's area of ulceration left foot.  Past Medical History:   Diagnosis Date    Diabetes mellitus     Diabetes mellitus, type 2     Hyperlipidemia     Hypertension     Myocardial infarction      Past Surgical History:   Procedure Laterality Date    CORONARY ANGIOGRAPHY N/A 2022    Procedure: Left heart cath;  Surgeon: Rohith Guajardo MD;  Location: [facility] CATH/EP LAB;  Service: Cardiology;  Laterality: N/A;     Family History   Problem Relation Age of Onset    Heart disease Mother     Stroke Mother     Diabetes Mother     Heart disease Father     Diabetes Father      Social History     Socioeconomic History    Marital status: Single   Tobacco Use    Smoking status: Former     Current packs/day: 1.00     Types: Cigarettes     Passive exposure: Current    Smokeless tobacco: Never    Tobacco comments:     Quit 2022   Substance and Sexual Activity    Alcohol use: Never    Drug use: Never       Current Outpatient Medications   Medication Sig Dispense Refill    aspirin (ECOTRIN) 81 MG EC tablet Take 81 mg by mouth once daily.      atorvastatin (LIPITOR) 40 MG tablet Take by mouth.      clopidogreL (PLAVIX) 75 mg tablet Take 75 mg by mouth.      JARDIANCE 10 mg tablet Take 10 mg by mouth every morning.      metFORMIN (GLUCOPHAGE-XR) 500 MG ER 24hr tablet Take 1,000 mg by mouth 2 (two) times daily.      metoprolol tartrate (LOPRESSOR) 25 MG tablet Take 12.5 mg by mouth 2 (two) times daily.      REPATHA SURECLICK 140 mg/mL PnIj SMARTSI Milligram(s) SUB-Q Every 2 Weeks      semaglutide (OZEMPIC) 0.25 mg or 0.5 mg (2 mg/3 mL) pen injector 0.25 mg.       No current facility-administered medications for this visit.     Review of patient's allergies  indicates:   Allergen Reactions    Antihistamines - alkylamine     Sulfa (sulfonamide antibiotics)      Facial swelling       Review of Systems   Musculoskeletal:  Positive for joint swelling.   Skin:  Positive for color change and wound.   Neurological:  Positive for numbness.   All other systems reviewed and are negative.      Objective:      Vitals:    04/02/24 1621   BP: 124/81   BP Location: Right arm   Patient Position: Sitting   Pulse: 92   Weight: 54.4 kg (119 lb 14.9 oz)   Height: 5' (1.524 m)     Physical Exam  Vitals and nursing note reviewed.   Constitutional:       Appearance: Normal appearance.   Cardiovascular:      Pulses:           Dorsalis pedis pulses are 2+ on the right side and 2+ on the left side.        Posterior tibial pulses are 1+ on the right side and 1+ on the left side.   Pulmonary:      Effort: Pulmonary effort is normal.   Musculoskeletal:         General: Swelling and deformity present.      Left foot: Deformity and bunion present.        Feet:    Feet:      Right foot:      Protective Sensation: 4 sites tested.   1 site sensed.     Left foot:      Protective Sensation: 4 sites tested.   1 site sensed.     Skin integrity: Ulcer, skin breakdown and callus present.   Skin:     General: Skin is warm.      Findings: Lesion present.   Neurological:      Mental Status: She is alert.      Sensory: Sensory deficit present.   Psychiatric:         Mood and Affect: Mood normal.         Behavior: Behavior normal.                                                                                                                Assessment:       1. Ulcer of left foot with fat layer exposed    2. Type II diabetes mellitus with neurological manifestations    3. Comprehensive diabetic foot examination, type 2 DM, encounter for    4. Tobacco use          Plan:       Patient presents today follow-up on ulceration left foot secondary to severe bunion deformity with dislocation of the 1st MPJ left.  Patient  is being seen for an unscheduled visit she states the area was looking absolutely fine and then subsequently split open and started to drain significantly she states she noticed drainage at 1st on last Thursday by Saturday the area had broken open.  Patient is very discouraged because this had been completely closed and healed clearly the bunion deformities putting a lot of stress and pressure on the skin surrounding the 1st MPJ especially the plantar aspect of the joint left.  The area has split open however it is not obviously infected I did advised the patient I am concerned about bacteria presentation because of the heavy drainage is currently 2 cm long by 2 mm wide by 5 mm deep I did perform a culture and sensitivity on the area which subsequently displayed only normal skin bacteria patient was not placed on antibiotics she is going to clean the area with Dakin solution gently pack the area with iodoform packing strips cover with Xeroform and a protective dressing she knows that she needs changes daily keeping it dry I do plan to see her at her normally scheduled appointment in the next 10-14 days unless she has any problems questions or concerns sooner.  Non excisional debridement was performed today.  This note was created using [employer] voice recognition software that occasionally misinterpreted phrases or words.

## 2024-04-16 ENCOUNTER — OFFICE VISIT (OUTPATIENT)
Dept: PODIATRY | Facility: CLINIC | Age: 59
End: 2024-04-16
Payer: COMMERCIAL

## 2024-04-16 VITALS
BODY MASS INDEX: 23.55 KG/M2 | HEIGHT: 60 IN | WEIGHT: 119.94 LBS | HEART RATE: 95 BPM | SYSTOLIC BLOOD PRESSURE: 108 MMHG | DIASTOLIC BLOOD PRESSURE: 71 MMHG

## 2024-04-16 DIAGNOSIS — Z72.0 TOBACCO USE: ICD-10-CM

## 2024-04-16 DIAGNOSIS — E11.49 TYPE II DIABETES MELLITUS WITH NEUROLOGICAL MANIFESTATIONS: ICD-10-CM

## 2024-04-16 DIAGNOSIS — M20.12 HALLUX VALGUS OF LEFT FOOT: ICD-10-CM

## 2024-04-16 DIAGNOSIS — L97.522 ULCER OF LEFT FOOT WITH FAT LAYER EXPOSED: Primary | ICD-10-CM

## 2024-04-16 DIAGNOSIS — E11.9 COMPREHENSIVE DIABETIC FOOT EXAMINATION, TYPE 2 DM, ENCOUNTER FOR: ICD-10-CM

## 2024-04-16 PROCEDURE — 1160F RVW MEDS BY RX/DR IN RCRD: CPT | Mod: CPTII,S$GLB,, | Performed by: PODIATRIST

## 2024-04-16 PROCEDURE — 99213 OFFICE O/P EST LOW 20 MIN: CPT | Mod: S$GLB,,, | Performed by: PODIATRIST

## 2024-04-16 PROCEDURE — 3078F DIAST BP <80 MM HG: CPT | Mod: CPTII,S$GLB,, | Performed by: PODIATRIST

## 2024-04-16 PROCEDURE — 99999 PR PBB SHADOW E&M-EST. PATIENT-LVL IV: CPT | Mod: PBBFAC,,, | Performed by: PODIATRIST

## 2024-04-16 PROCEDURE — 1159F MED LIST DOCD IN RCRD: CPT | Mod: CPTII,S$GLB,, | Performed by: PODIATRIST

## 2024-04-16 PROCEDURE — 3074F SYST BP LT 130 MM HG: CPT | Mod: CPTII,S$GLB,, | Performed by: PODIATRIST

## 2024-04-16 PROCEDURE — 3008F BODY MASS INDEX DOCD: CPT | Mod: CPTII,S$GLB,, | Performed by: PODIATRIST

## 2024-04-18 NOTE — PROGRESS NOTES
Subjective:       Patient ID: No Munoz is a 58 y.o. female.    Chief Complaint: Foot Ulcer  Patient presents for follow-up ulceration left foot.  Patient is a diabetic and has a severe bunion deformity which contribute to the patient's area of ulceration left foot.  Past Medical History:   Diagnosis Date    Diabetes mellitus     Diabetes mellitus, type 2     Hyperlipidemia     Hypertension     Myocardial infarction      Past Surgical History:   Procedure Laterality Date    CORONARY ANGIOGRAPHY N/A 2022    Procedure: Left heart cath;  Surgeon: Rohith Guajardo MD;  Location: [facility] CATH/EP LAB;  Service: Cardiology;  Laterality: N/A;     Family History   Problem Relation Name Age of Onset    Heart disease Mother      Stroke Mother      Diabetes Mother      Heart disease Father      Diabetes Father       Social History     Socioeconomic History    Marital status: Single   Tobacco Use    Smoking status: Former     Current packs/day: 1.00     Types: Cigarettes     Passive exposure: Current    Smokeless tobacco: Never    Tobacco comments:     Quit 2022   Substance and Sexual Activity    Alcohol use: Never    Drug use: Never       Current Outpatient Medications   Medication Sig Dispense Refill    aspirin (ECOTRIN) 81 MG EC tablet Take 81 mg by mouth once daily.      atorvastatin (LIPITOR) 40 MG tablet Take by mouth.      clopidogreL (PLAVIX) 75 mg tablet Take 75 mg by mouth.      JARDIANCE 10 mg tablet Take 10 mg by mouth every morning.      metFORMIN (GLUCOPHAGE-XR) 500 MG ER 24hr tablet Take 1,000 mg by mouth 2 (two) times daily.      metoprolol tartrate (LOPRESSOR) 25 MG tablet Take 12.5 mg by mouth 2 (two) times daily.      REPATHA SURECLICK 140 mg/mL PnIj SMARTSI Milligram(s) SUB-Q Every 2 Weeks      semaglutide (OZEMPIC) 0.25 mg or 0.5 mg (2 mg/3 mL) pen injector 0.25 mg.       No current facility-administered medications for this visit.     Review of patient's allergies indicates:    Allergen Reactions    Antihistamines - alkylamine     Sulfa (sulfonamide antibiotics)      Facial swelling       Review of Systems   Musculoskeletal:  Positive for joint swelling.   Skin:  Positive for color change and wound.   Neurological:  Positive for numbness.   All other systems reviewed and are negative.      Objective:      Vitals:    04/16/24 1051   BP: 108/71   BP Location: Right arm   Patient Position: Sitting   Pulse: 95   Weight: 54.4 kg (119 lb 14.9 oz)   Height: 5' (1.524 m)     Physical Exam  Vitals and nursing note reviewed.   Constitutional:       Appearance: Normal appearance.   Cardiovascular:      Pulses:           Dorsalis pedis pulses are 2+ on the right side and 2+ on the left side.        Posterior tibial pulses are 1+ on the right side and 1+ on the left side.   Pulmonary:      Effort: Pulmonary effort is normal.   Musculoskeletal:         General: Swelling and deformity present.      Left foot: Deformity and bunion present.        Feet:    Feet:      Right foot:      Protective Sensation: 4 sites tested.   1 site sensed.     Left foot:      Protective Sensation: 4 sites tested.   1 site sensed.     Skin integrity: Ulcer, skin breakdown and callus present.   Skin:     General: Skin is warm.      Findings: Lesion present.   Neurological:      Mental Status: She is alert.      Sensory: Sensory deficit present.   Psychiatric:         Mood and Affect: Mood normal.         Behavior: Behavior normal.                                Assessment:       1. Ulcer of left foot with fat layer exposed    2. Hallux valgus of left foot    3. Type II diabetes mellitus with neurological manifestations    4. Comprehensive diabetic foot examination, type 2 DM, encounter for    5. Tobacco use          Plan:       Patient presents today follow-up on ulceration left foot secondary to severe bunion deformity with dislocation of the 1st MPJ left.  Patient's ulceration had been completely closed over however the  plantar most ulceration did split open it is currently 1.2 cm long by 2 mm wide by 5 mm deep.  Patient advised the overall length of the ulceration has decreased by 8 mm the width in the depth are a proximally the same patient had very limited callus buildup which indicates she is keeping the area well hydrated and well padded.  Patient has had very little drainage her previous culture was negative for active bacterial growth that required antibiotic therapy.  Patient  is going to clean the area with Dakin solution gently pack the area with iodoform packing strips cover with Xeroform and a protective dressing she knows that she needs changes daily keeping it dry I do plan to see her at her normally scheduled appointment in the next 21 days unless she has any problems questions or concerns sooner.  Non excisional debridement was performed today.  I will look at other options for getting this area completely resolved and healed possibly primary closure however this would require a period of time of nonweightbearing patient can not afford to take the time off that is required to do the corrective bunion surgery and addressing this deformity would most certainly allow this area to heal completely.  This note was created using [employer] voice recognition software that occasionally misinterpreted phrases or words.

## 2024-05-07 ENCOUNTER — OFFICE VISIT (OUTPATIENT)
Dept: PODIATRY | Facility: CLINIC | Age: 59
End: 2024-05-07
Payer: COMMERCIAL

## 2024-05-07 VITALS
DIASTOLIC BLOOD PRESSURE: 73 MMHG | HEART RATE: 92 BPM | BODY MASS INDEX: 23.55 KG/M2 | HEIGHT: 60 IN | WEIGHT: 119.94 LBS | SYSTOLIC BLOOD PRESSURE: 110 MMHG

## 2024-05-07 DIAGNOSIS — E11.49 TYPE II DIABETES MELLITUS WITH NEUROLOGICAL MANIFESTATIONS: ICD-10-CM

## 2024-05-07 DIAGNOSIS — E11.9 COMPREHENSIVE DIABETIC FOOT EXAMINATION, TYPE 2 DM, ENCOUNTER FOR: ICD-10-CM

## 2024-05-07 DIAGNOSIS — L97.522 ULCER OF LEFT FOOT WITH FAT LAYER EXPOSED: Primary | ICD-10-CM

## 2024-05-07 DIAGNOSIS — M20.12 HALLUX VALGUS OF LEFT FOOT: ICD-10-CM

## 2024-05-07 PROCEDURE — 3078F DIAST BP <80 MM HG: CPT | Mod: CPTII,S$GLB,, | Performed by: PODIATRIST

## 2024-05-07 PROCEDURE — 3074F SYST BP LT 130 MM HG: CPT | Mod: CPTII,S$GLB,, | Performed by: PODIATRIST

## 2024-05-07 PROCEDURE — 1160F RVW MEDS BY RX/DR IN RCRD: CPT | Mod: CPTII,S$GLB,, | Performed by: PODIATRIST

## 2024-05-07 PROCEDURE — 99214 OFFICE O/P EST MOD 30 MIN: CPT | Mod: S$GLB,,, | Performed by: PODIATRIST

## 2024-05-07 PROCEDURE — 1159F MED LIST DOCD IN RCRD: CPT | Mod: CPTII,S$GLB,, | Performed by: PODIATRIST

## 2024-05-07 PROCEDURE — 3008F BODY MASS INDEX DOCD: CPT | Mod: CPTII,S$GLB,, | Performed by: PODIATRIST

## 2024-05-07 PROCEDURE — 99999 PR PBB SHADOW E&M-EST. PATIENT-LVL IV: CPT | Mod: PBBFAC,,, | Performed by: PODIATRIST

## 2024-05-09 NOTE — PROGRESS NOTES
Subjective:       Patient ID: No Munoz is a 58 y.o. female.    Chief Complaint: Foot Ulcer  Patient presents for follow-up ulceration left foot.  Patient is a diabetic and has a severe bunion deformity which contribute to the patient's area of ulceration left foot.  Past Medical History:   Diagnosis Date    Diabetes mellitus     Diabetes mellitus, type 2     Hyperlipidemia     Hypertension     Myocardial infarction      Past Surgical History:   Procedure Laterality Date    CORONARY ANGIOGRAPHY N/A 2022    Procedure: Left heart cath;  Surgeon: Rohith Guajardo MD;  Location: [facility] CATH/EP LAB;  Service: Cardiology;  Laterality: N/A;     Family History   Problem Relation Name Age of Onset    Heart disease Mother      Stroke Mother      Diabetes Mother      Heart disease Father      Diabetes Father       Social History     Socioeconomic History    Marital status: Single   Tobacco Use    Smoking status: Former     Current packs/day: 1.00     Types: Cigarettes     Passive exposure: Current    Smokeless tobacco: Never    Tobacco comments:     Quit 2022   Substance and Sexual Activity    Alcohol use: Never    Drug use: Never       Current Outpatient Medications   Medication Sig Dispense Refill    aspirin (ECOTRIN) 81 MG EC tablet Take 81 mg by mouth once daily.      atorvastatin (LIPITOR) 40 MG tablet Take by mouth.      clopidogreL (PLAVIX) 75 mg tablet Take 75 mg by mouth.      JARDIANCE 10 mg tablet Take 10 mg by mouth every morning.      metFORMIN (GLUCOPHAGE-XR) 500 MG ER 24hr tablet Take 1,000 mg by mouth 2 (two) times daily.      metoprolol tartrate (LOPRESSOR) 25 MG tablet Take 12.5 mg by mouth 2 (two) times daily.      REPATHA SURECLICK 140 mg/mL PnIj SMARTSI Milligram(s) SUB-Q Every 2 Weeks      semaglutide (OZEMPIC) 0.25 mg or 0.5 mg (2 mg/3 mL) pen injector 0.25 mg.       No current facility-administered medications for this visit.     Review of patient's allergies indicates:    Allergen Reactions    Antihistamines - alkylamine     Sulfa (sulfonamide antibiotics)      Facial swelling       Review of Systems   Musculoskeletal:  Positive for joint swelling.   Skin:  Positive for color change and wound.   Neurological:  Positive for numbness.   All other systems reviewed and are negative.      Objective:      Vitals:    05/07/24 1032   BP: 110/73   BP Location: Left arm   Patient Position: Sitting   Pulse: 92   Weight: 54.4 kg (119 lb 14.9 oz)   Height: 5' (1.524 m)     Physical Exam  Vitals and nursing note reviewed.   Constitutional:       Appearance: Normal appearance.   Cardiovascular:      Pulses:           Dorsalis pedis pulses are 2+ on the right side and 2+ on the left side.        Posterior tibial pulses are 1+ on the right side and 1+ on the left side.   Pulmonary:      Effort: Pulmonary effort is normal.   Musculoskeletal:         General: Swelling and deformity present.      Left foot: Deformity and bunion present.        Feet:    Feet:      Right foot:      Protective Sensation: 4 sites tested.   1 site sensed.     Left foot:      Protective Sensation: 4 sites tested.   1 site sensed.     Skin integrity: Ulcer, skin breakdown and callus present.   Skin:     General: Skin is warm.      Findings: Lesion present.   Neurological:      Mental Status: She is alert.      Sensory: Sensory deficit present.   Psychiatric:         Mood and Affect: Mood normal.         Behavior: Behavior normal.                                                  Assessment:       1. Ulcer of left foot with fat layer exposed    2. Type II diabetes mellitus with neurological manifestations    3. Comprehensive diabetic foot examination, type 2 DM, encounter for    4. Hallux valgus of left foot          Plan:       Patient presents today follow-up on ulceration left foot secondary to severe bunion deformity with dislocation of the 1st MPJ left.  Patient's ulceration had been completely closed over however the  plantar most ulceration did split open it is currently 1.0 cm long by 2 mm wide by 5 mm deep.  Patient advised the overall length of the ulceration has decreased by 2 mm the length in the depth are a proximally the same patient had very limited callus buildup which indicates she is keeping the area well hydrated and well padded.   Patient  is going to clean the area with Dakin solution gently pack the area with promogran with Ag cover with Xeroform and a protective dressing she knows that she needs changes daily keeping it dry I do plan to see her at her normally scheduled appointment in the next 21 days unless she has any problems questions or concerns sooner.  Non excisional debridement was performed today.  I will look at other options for getting this area completely resolved and healed possibly primary closure however this would require a period of time of nonweightbearing patient can not afford to take the time off that is required to do the corrective bunion surgery and addressing this deformity would most certainly allow this area to heal completely.  I did explain to the patient today that ultimately we have to try to fill this void and the space where the patient has a current ulceration I am hopeful that the Promogran with the collagen will help to fill this area in I am going to look into other products that may be helpful it filling this void while keeping the area infection-free.  This note was created using [employer] voice recognition software that occasionally misinterpreted phrases or words.

## 2024-05-30 ENCOUNTER — OFFICE VISIT (OUTPATIENT)
Dept: PODIATRY | Facility: CLINIC | Age: 59
End: 2024-05-30
Payer: COMMERCIAL

## 2024-05-30 VITALS
BODY MASS INDEX: 23.55 KG/M2 | DIASTOLIC BLOOD PRESSURE: 79 MMHG | SYSTOLIC BLOOD PRESSURE: 119 MMHG | HEART RATE: 94 BPM | HEIGHT: 60 IN | WEIGHT: 119.94 LBS

## 2024-05-30 DIAGNOSIS — E11.49 TYPE II DIABETES MELLITUS WITH NEUROLOGICAL MANIFESTATIONS: ICD-10-CM

## 2024-05-30 DIAGNOSIS — L97.522 ULCER OF LEFT FOOT WITH FAT LAYER EXPOSED: Primary | ICD-10-CM

## 2024-05-30 DIAGNOSIS — E11.9 COMPREHENSIVE DIABETIC FOOT EXAMINATION, TYPE 2 DM, ENCOUNTER FOR: ICD-10-CM

## 2024-05-30 PROCEDURE — 3008F BODY MASS INDEX DOCD: CPT | Mod: CPTII,S$GLB,, | Performed by: PODIATRIST

## 2024-05-30 PROCEDURE — 99999 PR PBB SHADOW E&M-EST. PATIENT-LVL IV: CPT | Mod: PBBFAC,,, | Performed by: PODIATRIST

## 2024-05-30 PROCEDURE — 3078F DIAST BP <80 MM HG: CPT | Mod: CPTII,S$GLB,, | Performed by: PODIATRIST

## 2024-05-30 PROCEDURE — 3074F SYST BP LT 130 MM HG: CPT | Mod: CPTII,S$GLB,, | Performed by: PODIATRIST

## 2024-05-30 PROCEDURE — 1160F RVW MEDS BY RX/DR IN RCRD: CPT | Mod: CPTII,S$GLB,, | Performed by: PODIATRIST

## 2024-05-30 PROCEDURE — 1159F MED LIST DOCD IN RCRD: CPT | Mod: CPTII,S$GLB,, | Performed by: PODIATRIST

## 2024-05-30 PROCEDURE — 99214 OFFICE O/P EST MOD 30 MIN: CPT | Mod: S$GLB,,, | Performed by: PODIATRIST

## 2024-06-03 NOTE — PROGRESS NOTES
Subjective:       Patient ID: No Munoz is a 58 y.o. female.    Chief Complaint: Foot Ulcer (Left foot) and Follow-up  Patient presents for follow-up ulceration left foot.  Patient is a diabetic and has a severe bunion deformity which contribute to the patient's area of ulceration left foot.  Past Medical History:   Diagnosis Date    Diabetes mellitus     Diabetes mellitus, type 2     Hyperlipidemia     Hypertension     Myocardial infarction      Past Surgical History:   Procedure Laterality Date    CORONARY ANGIOGRAPHY N/A 2022    Procedure: Left heart cath;  Surgeon: Rohith Guajardo MD;  Location: [facility] CATH/EP LAB;  Service: Cardiology;  Laterality: N/A;     Family History   Problem Relation Name Age of Onset    Heart disease Mother      Stroke Mother      Diabetes Mother      Heart disease Father      Diabetes Father       Social History     Socioeconomic History    Marital status: Single   Tobacco Use    Smoking status: Former     Current packs/day: 1.00     Types: Cigarettes     Passive exposure: Current    Smokeless tobacco: Never    Tobacco comments:     Quit 2022   Substance and Sexual Activity    Alcohol use: Never    Drug use: Never       Current Outpatient Medications   Medication Sig Dispense Refill    aspirin (ECOTRIN) 81 MG EC tablet Take 81 mg by mouth once daily.      atorvastatin (LIPITOR) 40 MG tablet Take by mouth.      clopidogreL (PLAVIX) 75 mg tablet Take 75 mg by mouth.      JARDIANCE 10 mg tablet Take 10 mg by mouth every morning.      metFORMIN (GLUCOPHAGE-XR) 500 MG ER 24hr tablet Take 1,000 mg by mouth 2 (two) times daily.      metoprolol tartrate (LOPRESSOR) 25 MG tablet Take 12.5 mg by mouth 2 (two) times daily.      REPATHA SURECLICK 140 mg/mL PnIj SMARTSI Milligram(s) SUB-Q Every 2 Weeks      semaglutide (OZEMPIC) 0.25 mg or 0.5 mg (2 mg/3 mL) pen injector 0.25 mg.       No current facility-administered medications for this visit.     Review of  patient's allergies indicates:   Allergen Reactions    Antihistamines - alkylamine     Sulfa (sulfonamide antibiotics)      Facial swelling       Review of Systems   Musculoskeletal:  Positive for joint swelling.   Skin:  Positive for color change and wound.   Neurological:  Positive for numbness.   All other systems reviewed and are negative.      Objective:      Vitals:    05/30/24 1626   BP: 119/79   BP Location: Right arm   Patient Position: Sitting   Pulse: 94   Weight: 54.4 kg (119 lb 14.9 oz)   Height: 5' (1.524 m)     Physical Exam  Vitals and nursing note reviewed.   Constitutional:       Appearance: Normal appearance.   Cardiovascular:      Pulses:           Dorsalis pedis pulses are 2+ on the right side and 2+ on the left side.        Posterior tibial pulses are 1+ on the right side and 1+ on the left side.   Pulmonary:      Effort: Pulmonary effort is normal.   Musculoskeletal:         General: Swelling and deformity present.      Left foot: Deformity and bunion present.        Feet:    Feet:      Right foot:      Protective Sensation: 4 sites tested.   1 site sensed.     Left foot:      Protective Sensation: 4 sites tested.   1 site sensed.     Skin integrity: Ulcer, skin breakdown and callus present.   Skin:     General: Skin is warm.      Findings: Lesion present.   Neurological:      Mental Status: She is alert.      Sensory: Sensory deficit present.   Psychiatric:         Mood and Affect: Mood normal.         Behavior: Behavior normal.                                                                    Assessment:       1. Ulcer of left foot with fat layer exposed    2. Type II diabetes mellitus with neurological manifestations    3. Comprehensive diabetic foot examination, type 2 DM, encounter for          Plan:       Patient presents today follow-up on ulceration left foot secondary to severe bunion deformity with dislocation of the 1st MPJ left.  Patient's ulceration had been completely closed  over however the plantar most ulceration did split open it is currently 0.8 cm long by 1 mm wide by 5 mm deep.   I did advised the patient the wound itself looks considerably better all of the surrounding skin that had been historically an ulceration looks much better it is much more normal durable skin as opposed to callus skin that frequently would break down this has gone through a phase where it is healed the skin has remodeled and looks much more normal however it needs to be kept well moisturized still.  I did advised the patient I looked into some other products that we may be able to use to tuck into the deficit I could not find anything that was any different than the Promogran with silver that we have been using and since we have signs of improvement I would recommend we continue to do this I advised the patient it is up to her to use her best judgment if a portion of the Promogran is dissolved then to replace that if there is some Promogran that is not dissolving she can certainly carefully remove that from the wound site and put a new piece in she will continue cleaning with Dakin's before she applies the Promogran with silver covering the whole area with Xeroform a proximally every other day or so depending upon whether not it looks too moist and then she keeps a light padded dressing over this area.  I did discuss with the patient as long as we know that the wound is infection-free closing it primarily however she does have a wedding coming up and she states she does not want to have to wear a fracture boot to a wedding has I discussed with her wearing a fracture boot even with primary closure to facilitate healing but it is definitely something to sit think about down the road but as of now since we are seeing progress I would recommend we continue doing what were doing and I will follow up with her in 3 weeks unless she has any problems questions or concerns sooner.  Minimal non excisional  debridement was performed today.  This note was created using [employer] voice recognition software that occasionally misinterpreted phrases or words.

## 2024-06-20 ENCOUNTER — OFFICE VISIT (OUTPATIENT)
Dept: PODIATRY | Facility: CLINIC | Age: 59
End: 2024-06-20
Payer: COMMERCIAL

## 2024-06-20 VITALS
BODY MASS INDEX: 23.55 KG/M2 | HEIGHT: 60 IN | WEIGHT: 119.94 LBS | SYSTOLIC BLOOD PRESSURE: 100 MMHG | HEART RATE: 91 BPM | DIASTOLIC BLOOD PRESSURE: 61 MMHG

## 2024-06-20 DIAGNOSIS — E11.9 COMPREHENSIVE DIABETIC FOOT EXAMINATION, TYPE 2 DM, ENCOUNTER FOR: ICD-10-CM

## 2024-06-20 DIAGNOSIS — E11.49 TYPE II DIABETES MELLITUS WITH NEUROLOGICAL MANIFESTATIONS: ICD-10-CM

## 2024-06-20 DIAGNOSIS — Z72.0 TOBACCO USE: ICD-10-CM

## 2024-06-20 DIAGNOSIS — L97.522 ULCER OF LEFT FOOT WITH FAT LAYER EXPOSED: Primary | ICD-10-CM

## 2024-06-20 PROCEDURE — 1159F MED LIST DOCD IN RCRD: CPT | Mod: CPTII,S$GLB,, | Performed by: PODIATRIST

## 2024-06-20 PROCEDURE — 3008F BODY MASS INDEX DOCD: CPT | Mod: CPTII,S$GLB,, | Performed by: PODIATRIST

## 2024-06-20 PROCEDURE — 99213 OFFICE O/P EST LOW 20 MIN: CPT | Mod: S$GLB,,, | Performed by: PODIATRIST

## 2024-06-20 PROCEDURE — 3078F DIAST BP <80 MM HG: CPT | Mod: CPTII,S$GLB,, | Performed by: PODIATRIST

## 2024-06-20 PROCEDURE — 99999 PR PBB SHADOW E&M-EST. PATIENT-LVL IV: CPT | Mod: PBBFAC,,, | Performed by: PODIATRIST

## 2024-06-20 PROCEDURE — 1160F RVW MEDS BY RX/DR IN RCRD: CPT | Mod: CPTII,S$GLB,, | Performed by: PODIATRIST

## 2024-06-20 PROCEDURE — 3074F SYST BP LT 130 MM HG: CPT | Mod: CPTII,S$GLB,, | Performed by: PODIATRIST

## 2024-06-23 NOTE — PROGRESS NOTES
Subjective:       Patient ID: No Munoz is a 59 y.o. female.    Chief Complaint: Foot Ulcer  Patient presents for follow-up ulceration left foot.  Patient is a diabetic and has a severe bunion deformity which contribute to the patient's area of ulceration left foot.  Past Medical History:   Diagnosis Date    Diabetes mellitus     Diabetes mellitus, type 2     Hyperlipidemia     Hypertension     Myocardial infarction      Past Surgical History:   Procedure Laterality Date    CORONARY ANGIOGRAPHY N/A 2022    Procedure: Left heart cath;  Surgeon: Rohith Guajardo MD;  Location: [facility] CATH/EP LAB;  Service: Cardiology;  Laterality: N/A;     Family History   Problem Relation Name Age of Onset    Heart disease Mother      Stroke Mother      Diabetes Mother      Heart disease Father      Diabetes Father       Social History     Socioeconomic History    Marital status: Single   Tobacco Use    Smoking status: Former     Current packs/day: 1.00     Types: Cigarettes     Passive exposure: Current    Smokeless tobacco: Never    Tobacco comments:     Quit 2022   Substance and Sexual Activity    Alcohol use: Never    Drug use: Never       Current Outpatient Medications   Medication Sig Dispense Refill    aspirin (ECOTRIN) 81 MG EC tablet Take 81 mg by mouth once daily.      atorvastatin (LIPITOR) 40 MG tablet Take by mouth.      clopidogreL (PLAVIX) 75 mg tablet Take 75 mg by mouth.      JARDIANCE 10 mg tablet Take 10 mg by mouth every morning.      metFORMIN (GLUCOPHAGE-XR) 500 MG ER 24hr tablet Take 1,000 mg by mouth 2 (two) times daily.      metoprolol tartrate (LOPRESSOR) 25 MG tablet Take 12.5 mg by mouth 2 (two) times daily.      REPATHA SURECLICK 140 mg/mL PnIj SMARTSI Milligram(s) SUB-Q Every 2 Weeks      semaglutide (OZEMPIC) 0.25 mg or 0.5 mg (2 mg/3 mL) pen injector 0.25 mg.       No current facility-administered medications for this visit.     Review of patient's allergies indicates:    Allergen Reactions    Antihistamines - alkylamine     Sulfa (sulfonamide antibiotics)      Facial swelling       Review of Systems   Musculoskeletal:  Positive for joint swelling.   Skin:  Positive for color change and wound.   Neurological:  Positive for numbness.   All other systems reviewed and are negative.      Objective:      Vitals:    06/20/24 1543   BP: 100/61   BP Location: Right arm   Patient Position: Sitting   Pulse: 91   Weight: 54.4 kg (119 lb 14.9 oz)   Height: 5' (1.524 m)     Physical Exam  Vitals and nursing note reviewed.   Constitutional:       Appearance: Normal appearance.   Cardiovascular:      Pulses:           Dorsalis pedis pulses are 2+ on the right side and 2+ on the left side.        Posterior tibial pulses are 1+ on the right side and 1+ on the left side.   Pulmonary:      Effort: Pulmonary effort is normal.   Musculoskeletal:         General: Swelling and deformity present.      Left foot: Deformity and bunion present.        Feet:    Feet:      Right foot:      Protective Sensation: 4 sites tested.   1 site sensed.     Left foot:      Protective Sensation: 4 sites tested.   1 site sensed.     Skin integrity: Ulcer, skin breakdown and callus present.   Skin:     General: Skin is warm.      Findings: Lesion present.   Neurological:      Mental Status: She is alert.      Sensory: Sensory deficit present.   Psychiatric:         Mood and Affect: Mood normal.         Behavior: Behavior normal.                                  Assessment:       1. Ulcer of left foot with fat layer exposed    2. Type II diabetes mellitus with neurological manifestations    3. Comprehensive diabetic foot examination, type 2 DM, encounter for    4. Tobacco use          Plan:       Patient presents today follow-up on ulceration left foot secondary to severe bunion deformity with dislocation of the 1st MPJ left.  Patient's ulceration had been completely closed over however the plantar most ulceration did split  open it is currently 0.8 cm long by 1 mm wide by 5 mm deep.   I did advised the patient the wound itself looks considerably better all of the surrounding skin that had been historically an ulceration looks much better it is much more normal durable skin as opposed to callus skin that frequently would break down this has gone through a phase where it is healed the skin has remodeled and looks much more normal however it needs to be kept well moisturized still.  I advised the patient it is up to her to use her best judgment if a portion of the Promogran is dissolved then to replace that if there is some Promogran that is not dissolving she can certainly carefully remove that from the wound site and put a new piece in she will continue cleaning with Dakin's before she applies the Promogran with silver covering the whole area with Xeroform a proximally every other day or so depending upon whether not it looks too moist and then she keeps a light padded dressing over this area.  I did discuss with the patient as long as we know that the wound is infection-free closing it primarily however she does have a wedding coming up and she states she does not want to have to wear a fracture boot to a wedding has I discussed with her wearing a fracture boot even with primary closure to facilitate healing but it is definitely something to sit think about down the road but as of now since we are seeing progress I would recommend we continue doing what were doing and I will follow up with her in 3 weeks unless she has any problems questions or concerns sooner.  Minimal non excisional debridement was performed today.  I am going to have the patient use an 18 gauge Jelco tip to gently flushed the depth of the ulcer site with Dakin solution the depth itself has not changed the opening at the base of the wound is slightly smaller.  This note was created using [employer] voice recognition software that occasionally misinterpreted phrases or  words.

## 2024-06-26 ENCOUNTER — OFFICE VISIT (OUTPATIENT)
Dept: URGENT CARE | Facility: CLINIC | Age: 59
End: 2024-06-26
Payer: COMMERCIAL

## 2024-06-26 VITALS
HEART RATE: 117 BPM | TEMPERATURE: 101 F | RESPIRATION RATE: 20 BRPM | OXYGEN SATURATION: 96 % | WEIGHT: 120 LBS | SYSTOLIC BLOOD PRESSURE: 118 MMHG | HEIGHT: 60 IN | DIASTOLIC BLOOD PRESSURE: 74 MMHG | BODY MASS INDEX: 23.56 KG/M2

## 2024-06-26 DIAGNOSIS — R50.9 FEVER, UNSPECIFIED FEVER CAUSE: Primary | ICD-10-CM

## 2024-06-26 DIAGNOSIS — B97.89 VIRAL RESPIRATORY ILLNESS: ICD-10-CM

## 2024-06-26 DIAGNOSIS — J98.8 VIRAL RESPIRATORY ILLNESS: ICD-10-CM

## 2024-06-26 LAB
CTP QC/QA: YES
SARS-COV-2 AG RESP QL IA.RAPID: NEGATIVE

## 2024-06-26 PROCEDURE — 99213 OFFICE O/P EST LOW 20 MIN: CPT | Mod: S$GLB,,,

## 2024-06-26 PROCEDURE — 87811 SARS-COV-2 COVID19 W/OPTIC: CPT | Mod: QW,S$GLB,,

## 2024-06-26 NOTE — LETTER
June 26, 2024      [facility] - [address] [place] [email or web address], [address]  [place] [address]  Phone: [phone]  Fax: [phone]       Patient: No Munoz   YOB: 1965  Date of Visit: 06/26/2024    To Whom It May Concern:    Mehdi Munoz  was at [facility] on 06/26/2024. The patient may return to work/school on 06/24/2024 with no restrictions. If you have any questions or concerns, or if I can be of further assistance, please do not hesitate to contact me.    Sincerely,    Sandra Mensah, NP

## 2024-06-26 NOTE — PROGRESS NOTES
Subjective:      Patient ID: No Munoz is a 59 y.o. female.    Vitals:  height is 5' (1.524 m) and weight is 54.4 kg (120 lb). Her oral temperature is 100.8 °F (38.2 °C) (abnormal). Her blood pressure is 118/74 and her pulse is 117 (abnormal). Her respiration is 20 and oxygen saturation is 96%.     Chief Complaint: Fever    This is a 59 y.o. female who presents today with a chief complaint of fever x last night. Highest temperature was 99.6. Patient took a COVID test at home, but the test was . Patient denies any other symptoms. 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     Fever   This is a new problem. The current episode started 1 day ago. The problem occurs constantly. The problem has been unchanged. She has not experienced a heat injury.Maximum temperature: 99.6. The temperature was taken using an oral thermometer. Pertinent negatives include no congestion, coughing or sore throat. Associated symptoms comments: fatigue. She has tried NSAIDs for the symptoms. The treatment provided no relief.       Constitution: Positive for fever.   HENT:  Negative for congestion and sore throat.    Neck: neck negative.   Cardiovascular: Negative.    Eyes: Negative.    Respiratory:  Negative for cough.    Gastrointestinal: Negative.    Endocrine: negative.   Genitourinary: Negative.    Musculoskeletal: Negative.    Skin: Negative.    Allergic/Immunologic: Negative.    Neurological: Negative.    Hematologic/Lymphatic: Negative.    Psychiatric/Behavioral: Negative.        Objective:     Physical Exam   Constitutional: She is oriented to person, place, and time.   HENT:   Head: Normocephalic and atraumatic.   Ears:   Right Ear: Tympanic membrane, external ear and ear canal normal.   Left Ear: Tympanic membrane, external ear and ear canal normal.   Nose: Nose normal.   Mouth/Throat: Mucous membranes are moist. Posterior oropharyngeal erythema present. Oropharynx is  clear.   Eyes: Conjunctivae are normal. Pupils are equal, round, and reactive to light.   Neck: Neck supple.   Cardiovascular: Normal rate, regular rhythm, normal heart sounds and normal pulses.   Pulmonary/Chest: Effort normal and breath sounds normal.   Abdominal: Normal appearance.   Musculoskeletal: Normal range of motion.         General: Normal range of motion.   Neurological: no focal deficit. She is alert, oriented to person, place, and time and at baseline.   Skin: Skin is warm.   Psychiatric: Her behavior is normal. Mood, judgment and thought content normal.   Nursing note and vitals reviewed.      Assessment:     1. Fever, unspecified fever cause    2. Viral respiratory illness      Results for orders placed or performed in visit on 06/26/24   SARS Coronavirus 2 Antigen, POCT Manual Read   Result Value Ref Range    SARS Coronavirus 2 Antigen Negative Negative     Acceptable Yes         Plan:       Fever, unspecified fever cause  -     SARS Coronavirus 2 Antigen, POCT Manual Read    Viral respiratory illness

## 2024-07-23 ENCOUNTER — OFFICE VISIT (OUTPATIENT)
Dept: PODIATRY | Facility: CLINIC | Age: 59
End: 2024-07-23
Payer: COMMERCIAL

## 2024-07-23 ENCOUNTER — HOSPITAL ENCOUNTER (OUTPATIENT)
Dept: RADIOLOGY | Facility: HOSPITAL | Age: 59
Discharge: HOME | End: 2024-07-23
Attending: PODIATRIST
Payer: COMMERCIAL

## 2024-07-23 VITALS
HEIGHT: 61 IN | WEIGHT: 119.94 LBS | DIASTOLIC BLOOD PRESSURE: 79 MMHG | BODY MASS INDEX: 22.64 KG/M2 | SYSTOLIC BLOOD PRESSURE: 130 MMHG | HEART RATE: 97 BPM

## 2024-07-23 DIAGNOSIS — Z72.0 TOBACCO USE: ICD-10-CM

## 2024-07-23 DIAGNOSIS — L97.522 ULCER OF LEFT FOOT WITH FAT LAYER EXPOSED: ICD-10-CM

## 2024-07-23 DIAGNOSIS — E11.9 COMPREHENSIVE DIABETIC FOOT EXAMINATION, TYPE 2 DM, ENCOUNTER FOR: ICD-10-CM

## 2024-07-23 DIAGNOSIS — E11.49 TYPE II DIABETES MELLITUS WITH NEUROLOGICAL MANIFESTATIONS: ICD-10-CM

## 2024-07-23 DIAGNOSIS — L97.522 ULCER OF LEFT FOOT WITH FAT LAYER EXPOSED: Primary | ICD-10-CM

## 2024-07-23 PROCEDURE — 1159F MED LIST DOCD IN RCRD: CPT | Mod: CPTII,S$GLB,, | Performed by: PODIATRIST

## 2024-07-23 PROCEDURE — 1160F RVW MEDS BY RX/DR IN RCRD: CPT | Mod: CPTII,S$GLB,, | Performed by: PODIATRIST

## 2024-07-23 PROCEDURE — 73630 X-RAY EXAM OF FOOT: CPT | Mod: TC,PN,LT

## 2024-07-23 PROCEDURE — 99999 PR PBB SHADOW E&M-EST. PATIENT-LVL IV: CPT | Mod: PBBFAC,,, | Performed by: PODIATRIST

## 2024-07-23 PROCEDURE — 3008F BODY MASS INDEX DOCD: CPT | Mod: CPTII,S$GLB,, | Performed by: PODIATRIST

## 2024-07-23 PROCEDURE — 99214 OFFICE O/P EST MOD 30 MIN: CPT | Mod: S$GLB,,, | Performed by: PODIATRIST

## 2024-07-23 PROCEDURE — 3075F SYST BP GE 130 - 139MM HG: CPT | Mod: CPTII,S$GLB,, | Performed by: PODIATRIST

## 2024-07-23 PROCEDURE — 3078F DIAST BP <80 MM HG: CPT | Mod: CPTII,S$GLB,, | Performed by: PODIATRIST

## 2024-07-23 PROCEDURE — 73630 X-RAY EXAM OF FOOT: CPT | Mod: 26,LT,, | Performed by: RADIOLOGY

## 2024-07-24 ENCOUNTER — TELEPHONE (OUTPATIENT)
Dept: PODIATRY | Facility: CLINIC | Age: 59
End: 2024-07-24
Payer: COMMERCIAL

## 2024-07-24 NOTE — TELEPHONE ENCOUNTER
----- Message from Everardo Sweeney DPM sent at 7/24/2024  7:10 AM CDT -----  Please advised the patient I did review her x-ray the bunion is not significantly changed the only differences there is significant soft tissue swelling now in comparison to her previous x-ray over a year ago but of course we discussed the soft tissue swelling in the reason for the swelling yesterday.  ----- Message -----  From: Interface, Rad Results In  Sent: 7/23/2024   2:25 PM CDT  To: Everardo Sweeney DPM

## 2024-07-24 NOTE — TELEPHONE ENCOUNTER
Notified patient of results. Patient verbalized understanding and did not have any questions or concerns at this time. TELLO Iglesias 07/24/2024

## 2024-07-26 NOTE — PROGRESS NOTES
Subjective:       Patient ID: No Munoz is a 59 y.o. female.    Chief Complaint: Foot Ulcer  Patient presents for follow-up ulceration left foot.  Patient is a diabetic and has a severe bunion deformity which contribute to the patient's area of ulceration left foot.  Past Medical History:   Diagnosis Date    Diabetes mellitus     Diabetes mellitus, type 2     Hyperlipidemia     Hypertension     Myocardial infarction      Past Surgical History:   Procedure Laterality Date    CORONARY ANGIOGRAPHY N/A 2022    Procedure: Left heart cath;  Surgeon: Rohith Guajardo MD;  Location: [facility] CATH/EP LAB;  Service: Cardiology;  Laterality: N/A;     Family History   Problem Relation Name Age of Onset    Heart disease Mother      Stroke Mother      Diabetes Mother      Heart disease Father      Diabetes Father       Social History     Socioeconomic History    Marital status: Single   Tobacco Use    Smoking status: Former     Current packs/day: 1.00     Types: Cigarettes     Passive exposure: Current    Smokeless tobacco: Never    Tobacco comments:     Quit 2022   Substance and Sexual Activity    Alcohol use: Never    Drug use: Never       Current Outpatient Medications   Medication Sig Dispense Refill    aspirin (ECOTRIN) 81 MG EC tablet Take 81 mg by mouth once daily.      atorvastatin (LIPITOR) 40 MG tablet Take by mouth.      clopidogreL (PLAVIX) 75 mg tablet Take 75 mg by mouth.      JARDIANCE 10 mg tablet Take 10 mg by mouth every morning.      metFORMIN (GLUCOPHAGE-XR) 500 MG ER 24hr tablet Take 1,000 mg by mouth 2 (two) times daily.      metoprolol tartrate (LOPRESSOR) 25 MG tablet Take 12.5 mg by mouth 2 (two) times daily.      REPATHA SURECLICK 140 mg/mL PnIj SMARTSI Milligram(s) SUB-Q Every 2 Weeks      semaglutide (OZEMPIC) 0.25 mg or 0.5 mg (2 mg/3 mL) pen injector 0.25 mg.       No current facility-administered medications for this visit.     Review of patient's allergies indicates:  "  Allergen Reactions    Antihistamines - alkylamine     Sulfa (sulfonamide antibiotics)      Facial swelling       Review of Systems   Musculoskeletal:  Positive for joint swelling.   Skin:  Positive for color change and wound.   Neurological:  Positive for numbness.   All other systems reviewed and are negative.      Objective:      Vitals:    07/23/24 1310   BP: 130/79   BP Location: Right arm   Patient Position: Sitting   Pulse: 97   Weight: 54.4 kg (119 lb 14.9 oz)   Height: 5' 1" (1.549 m)     Physical Exam  Vitals and nursing note reviewed.   Constitutional:       Appearance: Normal appearance.   Cardiovascular:      Pulses:           Dorsalis pedis pulses are 2+ on the right side and 2+ on the left side.        Posterior tibial pulses are 1+ on the right side and 1+ on the left side.   Pulmonary:      Effort: Pulmonary effort is normal.   Musculoskeletal:         General: Swelling and deformity present.      Left foot: Deformity and bunion present.        Feet:    Feet:      Right foot:      Protective Sensation: 4 sites tested.   1 site sensed.     Left foot:      Protective Sensation: 4 sites tested.   1 site sensed.     Skin integrity: Ulcer, skin breakdown and callus present.   Skin:     General: Skin is warm.      Findings: Lesion present.   Neurological:      Mental Status: She is alert.      Sensory: Sensory deficit present.   Psychiatric:         Mood and Affect: Mood normal.         Behavior: Behavior normal.                                                    Assessment:       1. Ulcer of left foot with fat layer exposed    2. Type II diabetes mellitus with neurological manifestations    3. Comprehensive diabetic foot examination, type 2 DM, encounter for    4. Tobacco use          Plan:       Patient presents today follow-up on ulceration left foot secondary to severe bunion deformity with dislocation of the 1st MPJ left.  Patient recently flew to [place] to attend a wedding she states she did not " realize how the flying and the traveling would cause severe swelling in her left foot she states by the time she got to [place] her left foot was very swollen her left great toe is very swollen and she states it took several days for this to even start going down she relates on the trip back she also experienced severe swelling.  Patient states she subsequently experienced a very large blister that popped and drained her toe was very red and swollen and she has a newer area of tissue breakdown the previous ulcer she states is looking better.  Patient states she had to stop flushing and irrigating the area with Dakin solution it seemed to cause the fluid to disperse under the ulcer site.  On evaluation patient has significant erythema edema of the left hallux there is a new area of tissue breakdown that is superior to the previous area of ulceration there is a lot of blister tissue that is subsequently peeled encompassing the 1st webspace of the left foot.  I am concerned because of the extent of the swelling and redness I did order plain film x-rays which did not display any erosive changes nor findings consistent with osteomyelitis although the patient does have significant swelling.  Patient has a lot of raw skin in and around the 1st webspace which puts her at risk for tissue breakdown she is returning to work on Thursday which has me concerned about additional stress and pressure on the left foot.  Patient will clean the area with Dakin solution apply silver alginate to the new area of tissue breakdown that I did non excisionally debride I do want to follow up with her in 2 weeks were going to monitor this closely there was no obvious signs of infection this appeared more inflammatory with associated tissue breakdown secondary to the swelling.  Patient understands any changes to the area should it look worse her symptoms worsen she is to contact us immediately but she is going to need to monitor this closely  especially a shoe returns to work putting more stress on the left foot.  This note was created using [employer] voice recognition software that occasionally misinterpreted phrases or words.

## 2024-08-22 ENCOUNTER — OFFICE VISIT (OUTPATIENT)
Dept: PODIATRY | Facility: CLINIC | Age: 59
End: 2024-08-22
Payer: COMMERCIAL

## 2024-08-22 VITALS
DIASTOLIC BLOOD PRESSURE: 70 MMHG | HEART RATE: 111 BPM | WEIGHT: 119.94 LBS | HEIGHT: 61 IN | SYSTOLIC BLOOD PRESSURE: 103 MMHG | BODY MASS INDEX: 22.64 KG/M2

## 2024-08-22 DIAGNOSIS — E11.9 COMPREHENSIVE DIABETIC FOOT EXAMINATION, TYPE 2 DM, ENCOUNTER FOR: ICD-10-CM

## 2024-08-22 DIAGNOSIS — L97.522 ULCER OF LEFT FOOT WITH FAT LAYER EXPOSED: Primary | ICD-10-CM

## 2024-08-22 DIAGNOSIS — E11.49 TYPE II DIABETES MELLITUS WITH NEUROLOGICAL MANIFESTATIONS: ICD-10-CM

## 2024-08-22 PROCEDURE — 3008F BODY MASS INDEX DOCD: CPT | Mod: CPTII,S$GLB,, | Performed by: PODIATRIST

## 2024-08-22 PROCEDURE — 99999 PR PBB SHADOW E&M-EST. PATIENT-LVL IV: CPT | Mod: PBBFAC,,, | Performed by: PODIATRIST

## 2024-08-22 PROCEDURE — 99213 OFFICE O/P EST LOW 20 MIN: CPT | Mod: S$GLB,,, | Performed by: PODIATRIST

## 2024-08-22 PROCEDURE — 3078F DIAST BP <80 MM HG: CPT | Mod: CPTII,S$GLB,, | Performed by: PODIATRIST

## 2024-08-22 PROCEDURE — 1159F MED LIST DOCD IN RCRD: CPT | Mod: CPTII,S$GLB,, | Performed by: PODIATRIST

## 2024-08-22 PROCEDURE — 3074F SYST BP LT 130 MM HG: CPT | Mod: CPTII,S$GLB,, | Performed by: PODIATRIST

## 2024-08-22 PROCEDURE — 1160F RVW MEDS BY RX/DR IN RCRD: CPT | Mod: CPTII,S$GLB,, | Performed by: PODIATRIST

## 2024-08-25 NOTE — PROGRESS NOTES
Subjective:       Patient ID: No Munoz is a 59 y.o. female.    Chief Complaint: Foot Ulcer  Patient presents for follow-up ulceration left foot.  Patient is a diabetic and has a severe bunion deformity which contribute to the patient's area of ulceration left foot.  Past Medical History:   Diagnosis Date    Diabetes mellitus     Diabetes mellitus, type 2     Hyperlipidemia     Hypertension     Myocardial infarction      Past Surgical History:   Procedure Laterality Date    CORONARY ANGIOGRAPHY N/A 2022    Procedure: Left heart cath;  Surgeon: Rohith Guajardo MD;  Location: [facility] CATH/EP LAB;  Service: Cardiology;  Laterality: N/A;     Family History   Problem Relation Name Age of Onset    Heart disease Mother      Stroke Mother      Diabetes Mother      Heart disease Father      Diabetes Father       Social History     Socioeconomic History    Marital status: Single   Tobacco Use    Smoking status: Former     Current packs/day: 1.00     Types: Cigarettes     Passive exposure: Current    Smokeless tobacco: Never    Tobacco comments:     Quit 2022   Substance and Sexual Activity    Alcohol use: Never    Drug use: Never       Current Outpatient Medications   Medication Sig Dispense Refill    aspirin (ECOTRIN) 81 MG EC tablet Take 81 mg by mouth once daily.      atorvastatin (LIPITOR) 40 MG tablet Take by mouth.      clopidogreL (PLAVIX) 75 mg tablet Take 75 mg by mouth.      JARDIANCE 10 mg tablet Take 10 mg by mouth every morning.      metFORMIN (GLUCOPHAGE-XR) 500 MG ER 24hr tablet Take 1,000 mg by mouth 2 (two) times daily.      metoprolol tartrate (LOPRESSOR) 25 MG tablet Take 12.5 mg by mouth 2 (two) times daily.      REPATHA SURECLICK 140 mg/mL PnIj SMARTSI Milligram(s) SUB-Q Every 2 Weeks      semaglutide (OZEMPIC) 0.25 mg or 0.5 mg (2 mg/3 mL) pen injector 0.25 mg.       No current facility-administered medications for this visit.     Review of patient's allergies indicates:  "  Allergen Reactions    Antihistamines - alkylamine     Sulfa (sulfonamide antibiotics)      Facial swelling       Review of Systems   Musculoskeletal:  Positive for joint swelling.   Skin:  Positive for color change and wound.   Neurological:  Positive for numbness.   All other systems reviewed and are negative.      Objective:      Vitals:    08/22/24 1607   BP: 103/70   BP Location: Right arm   Patient Position: Sitting   Pulse: (!) 111   Weight: 54.4 kg (119 lb 14.9 oz)   Height: 5' 1" (1.549 m)     Physical Exam  Vitals and nursing note reviewed.   Constitutional:       Appearance: Normal appearance.   Cardiovascular:      Pulses:           Dorsalis pedis pulses are 2+ on the right side and 2+ on the left side.        Posterior tibial pulses are 1+ on the right side and 1+ on the left side.   Pulmonary:      Effort: Pulmonary effort is normal.   Musculoskeletal:         General: Swelling and deformity present.      Left foot: Deformity and bunion present.        Feet:    Feet:      Right foot:      Protective Sensation: 4 sites tested.   1 site sensed.     Left foot:      Protective Sensation: 4 sites tested.   1 site sensed.     Skin integrity: Ulcer, skin breakdown and callus present.   Skin:     General: Skin is warm.      Findings: Lesion present.   Neurological:      Mental Status: She is alert.      Sensory: Sensory deficit present.   Psychiatric:         Mood and Affect: Mood normal.         Behavior: Behavior normal.                                                                  Assessment:       1. Ulcer of left foot with fat layer exposed    2. Comprehensive diabetic foot examination, type 2 DM, encounter for    3. Type II diabetes mellitus with neurological manifestations          Plan:       Patient presents today follow-up on ulceration left foot secondary to severe bunion deformity with dislocation of the 1st MPJ left.  Patient had a recent flare up of the ulcer sites on the left foot with a new " area of breakdown in the 1st webspace left she thinks the areas looking a lot better she was to have been seen in about 2 weeks however due to a work scheduling conflict it has been approximally 1 month since I saw her last.  The open wound itself does look better it is 8 mm long by 1 mm wide it appears stable the patient still has a lot of erythema edema of the left hallux but the patient feels that the areas looking a lot better the area of breakdown in the 1st webspace definitely looks a lot better.  Patient will clean the area with Dakin solution apply silver alginate to the new area of tissue breakdown that I did non excisionally debride I do want to follow up with her in 4 weeks were going to monitor this closely there was no obvious signs of infection this appeared more inflammatory with associated tissue breakdown secondary to the swelling.  Patient understands any changes to the area should it look worse her symptoms worsen she is to contact us immediately but she is going to need to monitor this closely especially a shoe returns to work putting more stress on the left foot.  This note was created using [employer] voice recognition software that occasionally misinterpreted phrases or words.

## 2024-09-26 ENCOUNTER — OFFICE VISIT (OUTPATIENT)
Dept: PODIATRY | Facility: CLINIC | Age: 59
End: 2024-09-26
Payer: COMMERCIAL

## 2024-09-26 VITALS
DIASTOLIC BLOOD PRESSURE: 75 MMHG | HEIGHT: 61 IN | SYSTOLIC BLOOD PRESSURE: 116 MMHG | WEIGHT: 119.94 LBS | HEART RATE: 104 BPM | BODY MASS INDEX: 22.64 KG/M2

## 2024-09-26 DIAGNOSIS — L97.522 ULCER OF LEFT FOOT WITH FAT LAYER EXPOSED: Primary | ICD-10-CM

## 2024-09-26 DIAGNOSIS — E11.9 COMPREHENSIVE DIABETIC FOOT EXAMINATION, TYPE 2 DM, ENCOUNTER FOR: ICD-10-CM

## 2024-09-26 DIAGNOSIS — E11.49 TYPE II DIABETES MELLITUS WITH NEUROLOGICAL MANIFESTATIONS: ICD-10-CM

## 2024-09-26 DIAGNOSIS — M71.072 ABSCESS OF BURSA OF LEFT FOOT: ICD-10-CM

## 2024-09-26 PROCEDURE — 1160F RVW MEDS BY RX/DR IN RCRD: CPT | Mod: CPTII,S$GLB,, | Performed by: PODIATRIST

## 2024-09-26 PROCEDURE — 87070 CULTURE OTHR SPECIMN AEROBIC: CPT | Performed by: PODIATRIST

## 2024-09-26 PROCEDURE — 3074F SYST BP LT 130 MM HG: CPT | Mod: CPTII,S$GLB,, | Performed by: PODIATRIST

## 2024-09-26 PROCEDURE — 99214 OFFICE O/P EST MOD 30 MIN: CPT | Mod: S$GLB,,, | Performed by: PODIATRIST

## 2024-09-26 PROCEDURE — 3078F DIAST BP <80 MM HG: CPT | Mod: CPTII,S$GLB,, | Performed by: PODIATRIST

## 2024-09-26 PROCEDURE — 1159F MED LIST DOCD IN RCRD: CPT | Mod: CPTII,S$GLB,, | Performed by: PODIATRIST

## 2024-09-26 PROCEDURE — 99999 PR PBB SHADOW E&M-EST. PATIENT-LVL IV: CPT | Mod: PBBFAC,,, | Performed by: PODIATRIST

## 2024-09-26 PROCEDURE — 3008F BODY MASS INDEX DOCD: CPT | Mod: CPTII,S$GLB,, | Performed by: PODIATRIST

## 2024-09-26 RX ORDER — SEMAGLUTIDE 1.34 MG/ML
1 INJECTION, SOLUTION SUBCUTANEOUS
COMMUNITY

## 2024-09-29 PROBLEM — M71.072 ABSCESS OF BURSA OF LEFT FOOT: Status: ACTIVE | Noted: 2024-09-29

## 2024-09-29 NOTE — PROGRESS NOTES
Subjective:       Patient ID: No Munoz is a 59 y.o. female.    Chief Complaint: Foot Ulcer  Patient presents for follow-up ulceration left foot.  Patient is a diabetic and has a severe bunion deformity which contribute to the patient's area of ulceration left foot.  Past Medical History:   Diagnosis Date    Diabetes mellitus     Diabetes mellitus, type 2     Hyperlipidemia     Hypertension     Myocardial infarction      Past Surgical History:   Procedure Laterality Date    CORONARY ANGIOGRAPHY N/A 2022    Procedure: Left heart cath;  Surgeon: Rohith Guajardo MD;  Location: [facility] CATH/EP LAB;  Service: Cardiology;  Laterality: N/A;     Family History   Problem Relation Name Age of Onset    Heart disease Mother      Stroke Mother      Diabetes Mother      Heart disease Father      Diabetes Father       Social History     Socioeconomic History    Marital status: Single   Tobacco Use    Smoking status: Former     Current packs/day: 1.00     Types: Cigarettes     Passive exposure: Current    Smokeless tobacco: Never    Tobacco comments:     Quit 2022   Substance and Sexual Activity    Alcohol use: Never    Drug use: Never       Current Outpatient Medications   Medication Sig Dispense Refill    aspirin (ECOTRIN) 81 MG EC tablet Take 81 mg by mouth once daily.      atorvastatin (LIPITOR) 40 MG tablet Take by mouth.      clopidogreL (PLAVIX) 75 mg tablet Take 75 mg by mouth.      JARDIANCE 10 mg tablet Take 10 mg by mouth every morning.      metFORMIN (GLUCOPHAGE-XR) 500 MG ER 24hr tablet Take 1,000 mg by mouth 2 (two) times daily.      metoprolol tartrate (LOPRESSOR) 25 MG tablet Take 12.5 mg by mouth 2 (two) times daily.      OZEMPIC 1 mg/dose (4 mg/3 mL) Inject 1 mg into the skin every 7 days.      REPATHA SURECLICK 140 mg/mL PnIj SMARTSI Milligram(s) SUB-Q Every 2 Weeks       No current facility-administered medications for this visit.     Review of patient's allergies indicates:  "  Allergen Reactions    Antihistamines - alkylamine     Sulfa (sulfonamide antibiotics)      Facial swelling       Review of Systems   Musculoskeletal:  Positive for joint swelling.   Skin:  Positive for color change and wound.   Neurological:  Positive for numbness.   All other systems reviewed and are negative.      Objective:      Vitals:    09/26/24 1618   BP: 116/75   BP Location: Right arm   Patient Position: Sitting   Pulse: 104   Weight: 54.4 kg (119 lb 14.9 oz)   Height: 5' 1" (1.549 m)     Physical Exam  Vitals and nursing note reviewed.   Constitutional:       Appearance: Normal appearance.   Cardiovascular:      Pulses:           Dorsalis pedis pulses are 2+ on the right side and 2+ on the left side.        Posterior tibial pulses are 1+ on the right side and 1+ on the left side.   Pulmonary:      Effort: Pulmonary effort is normal.   Musculoskeletal:         General: Swelling and deformity present.      Left foot: Deformity and bunion present.        Feet:    Feet:      Right foot:      Protective Sensation: 4 sites tested.   1 site sensed.     Left foot:      Protective Sensation: 4 sites tested.   1 site sensed.     Skin integrity: Ulcer, skin breakdown and callus present.   Skin:     General: Skin is warm.      Findings: Lesion present.   Neurological:      Mental Status: She is alert.      Sensory: Sensory deficit present.   Psychiatric:         Mood and Affect: Mood normal.         Behavior: Behavior normal.                                            Assessment:       1. Ulcer of left foot with fat layer exposed    2. Type II diabetes mellitus with neurological manifestations    3. Comprehensive diabetic foot examination, type 2 DM, encounter for    4. Abscess of bursa of left foot          Plan:       Patient presents today follow-up on ulceration left foot secondary to severe bunion deformity with dislocation of the 1st MPJ left.  Patient had a flare up of the ulcer sites on the left foot with a " newer area of breakdown in the 1st webspace left.  Patient states the area of the base of the 1st webspace left has remained open she states it does drain from time to time she pushes on the area but she has been cleaning the area.  Patient has a firm nodule of underlying abscess upon compression to the area significant amount of purulent drainage emitted from the area I did perform a culture and sensitivity on this the patient has similar drainage present at the plantar medial aspect 1st MPJ left where she has a chronic ulceration.  I did explain to the patient this may be tracking underlying the 1st MPJ and were going to discontinue packing the medial ulcer I advised the patient packing this area may be keeping the area from draining when it needs to so it has caused this area to remain open in the 1st webspace.  Patient will be placed on appropriate antibiotics pending finalized culture and sensitivity.  I was able to get all of the drainage out of the abscess of the base of the 1st webspace left I then gently flushed and irrigated both ulcer sites with Dakin solution I advised the patient we do not need to push a lot of the Dakin solution into the wound but we do need to do a superficial flush to prevent any continued bacterial accumulation and keep these areas open so they can drain the ulceration on the medial plantar 1st MPJ is a proximally 7 mm long by 2 I am concerned about the possibility of underlying osteomyelitis we will have to monitor this area and perform additional imaging studies as necessary.  Follow-up 2 weeks unless the patient has any problems questions or concerns sooner.  mm wide.  The entire medial 1st MPJ looks very good this has been previously ulcerated opened and previously infected it remains closed and healed there is just the small opening medial and the small opening lateral in the 1st webspace.  I do plan to follow up with the patient in 2 weeks she is going to gently flush each of  these areas with Dakin solution apply silver alginate and a padded dressing she understands that she can use Xeroform as needed to keep the medial aspect of the 1st MPJ moist so it does not become too callused and breakdown further certainly any changes questions concerns patient is to contact us immediately.  This note was created using [employer] voice recognition software that occasionally misinterpreted phrases or words.

## 2024-09-30 ENCOUNTER — TELEPHONE (OUTPATIENT)
Dept: PODIATRY | Facility: CLINIC | Age: 59
End: 2024-09-30
Payer: COMMERCIAL

## 2024-09-30 LAB — BACTERIA SPEC AEROBE CULT: NORMAL

## 2024-09-30 NOTE — TELEPHONE ENCOUNTER
----- Message from Everardo Sweeney DPM sent at 9/30/2024 10:34 AM CDT -----  Please advised the patient the culture displayed only normal skin bacteria which is surprising considering the amount of drainage will re-evaluate at her follow-up but at this point I am not going to put her on an oral antibiotic.  ----- Message -----  From: [facility] Interface  Sent: 9/28/2024   8:21 AM CDT  To: Everardo Sweeney DPM

## 2024-10-10 ENCOUNTER — OFFICE VISIT (OUTPATIENT)
Dept: PODIATRY | Facility: CLINIC | Age: 59
End: 2024-10-10
Payer: COMMERCIAL

## 2024-10-10 VITALS
SYSTOLIC BLOOD PRESSURE: 107 MMHG | HEIGHT: 61 IN | DIASTOLIC BLOOD PRESSURE: 73 MMHG | BODY MASS INDEX: 22.64 KG/M2 | WEIGHT: 119.94 LBS | HEART RATE: 99 BPM

## 2024-10-10 DIAGNOSIS — L97.522 ULCER OF LEFT FOOT WITH FAT LAYER EXPOSED: Primary | ICD-10-CM

## 2024-10-10 DIAGNOSIS — M71.072 ABSCESS OF BURSA OF LEFT FOOT: ICD-10-CM

## 2024-10-10 DIAGNOSIS — M20.12 HALLUX VALGUS OF LEFT FOOT: ICD-10-CM

## 2024-10-10 DIAGNOSIS — E11.9 COMPREHENSIVE DIABETIC FOOT EXAMINATION, TYPE 2 DM, ENCOUNTER FOR: ICD-10-CM

## 2024-10-10 DIAGNOSIS — E11.49 TYPE II DIABETES MELLITUS WITH NEUROLOGICAL MANIFESTATIONS: ICD-10-CM

## 2024-10-10 PROCEDURE — 3008F BODY MASS INDEX DOCD: CPT | Mod: CPTII,S$GLB,, | Performed by: PODIATRIST

## 2024-10-10 PROCEDURE — 99999 PR PBB SHADOW E&M-EST. PATIENT-LVL IV: CPT | Mod: PBBFAC,,, | Performed by: PODIATRIST

## 2024-10-10 PROCEDURE — 3078F DIAST BP <80 MM HG: CPT | Mod: CPTII,S$GLB,, | Performed by: PODIATRIST

## 2024-10-10 PROCEDURE — 3074F SYST BP LT 130 MM HG: CPT | Mod: CPTII,S$GLB,, | Performed by: PODIATRIST

## 2024-10-10 PROCEDURE — 99214 OFFICE O/P EST MOD 30 MIN: CPT | Mod: S$GLB,,, | Performed by: PODIATRIST

## 2024-10-10 PROCEDURE — 1159F MED LIST DOCD IN RCRD: CPT | Mod: CPTII,S$GLB,, | Performed by: PODIATRIST

## 2024-10-10 PROCEDURE — 1160F RVW MEDS BY RX/DR IN RCRD: CPT | Mod: CPTII,S$GLB,, | Performed by: PODIATRIST

## 2024-10-13 NOTE — PROGRESS NOTES
Subjective:       Patient ID: No Munoz is a 59 y.o. female.    Chief Complaint: Foot Ulcer  Patient presents for follow-up ulceration left foot.  Patient is a diabetic and has a severe bunion deformity which contribute to the patient's area of ulceration left foot.  Past Medical History:   Diagnosis Date    Diabetes mellitus     Diabetes mellitus, type 2     Hyperlipidemia     Hypertension     Myocardial infarction      Past Surgical History:   Procedure Laterality Date    CORONARY ANGIOGRAPHY N/A 2022    Procedure: Left heart cath;  Surgeon: Rohith Guajardo MD;  Location: [facility] CATH/EP LAB;  Service: Cardiology;  Laterality: N/A;     Family History   Problem Relation Name Age of Onset    Heart disease Mother      Stroke Mother      Diabetes Mother      Heart disease Father      Diabetes Father       Social History     Socioeconomic History    Marital status: Single   Tobacco Use    Smoking status: Former     Current packs/day: 1.00     Types: Cigarettes     Passive exposure: Current    Smokeless tobacco: Never    Tobacco comments:     Quit 2022   Substance and Sexual Activity    Alcohol use: Never    Drug use: Never       Current Outpatient Medications   Medication Sig Dispense Refill    aspirin (ECOTRIN) 81 MG EC tablet Take 81 mg by mouth once daily.      atorvastatin (LIPITOR) 40 MG tablet Take by mouth.      clopidogreL (PLAVIX) 75 mg tablet Take 75 mg by mouth.      JARDIANCE 10 mg tablet Take 10 mg by mouth every morning.      metFORMIN (GLUCOPHAGE-XR) 500 MG ER 24hr tablet Take 1,000 mg by mouth 2 (two) times daily.      metoprolol tartrate (LOPRESSOR) 25 MG tablet Take 12.5 mg by mouth 2 (two) times daily.      OZEMPIC 1 mg/dose (4 mg/3 mL) Inject 1 mg into the skin every 7 days.      REPATHA SURECLICK 140 mg/mL PnIj SMARTSI Milligram(s) SUB-Q Every 2 Weeks       No current facility-administered medications for this visit.     Review of patient's allergies indicates:  "  Allergen Reactions    Antihistamines - alkylamine     Sulfa (sulfonamide antibiotics)      Facial swelling       Review of Systems   Musculoskeletal:  Positive for joint swelling.   Skin:  Positive for color change and wound.   Neurological:  Positive for numbness.   All other systems reviewed and are negative.      Objective:      Vitals:    10/10/24 1535   BP: 107/73   Pulse: 99   Weight: 54.4 kg (119 lb 14.9 oz)   Height: 5' 1" (1.549 m)     Physical Exam  Vitals and nursing note reviewed.   Constitutional:       Appearance: Normal appearance.   Cardiovascular:      Pulses:           Dorsalis pedis pulses are 2+ on the right side and 2+ on the left side.        Posterior tibial pulses are 1+ on the right side and 1+ on the left side.   Pulmonary:      Effort: Pulmonary effort is normal.   Musculoskeletal:         General: Swelling and deformity present.      Left foot: Deformity and bunion present.        Feet:    Feet:      Right foot:      Protective Sensation: 4 sites tested.   1 site sensed.     Left foot:      Protective Sensation: 4 sites tested.   1 site sensed.     Skin integrity: Ulcer, skin breakdown and callus present.   Skin:     General: Skin is warm.      Findings: Lesion present.   Neurological:      Mental Status: She is alert.      Sensory: Sensory deficit present.   Psychiatric:         Mood and Affect: Mood normal.         Behavior: Behavior normal.                                                                Assessment:       1. Ulcer of left foot with fat layer exposed    2. Abscess of bursa of left foot    3. Hallux valgus of left foot    4. Type II diabetes mellitus with neurological manifestations    5. Comprehensive diabetic foot examination, type 2 DM, encounter for          Plan:       Patient presents today follow-up on ulceration left foot secondary to severe bunion deformity with dislocation of the 1st MPJ left.  Previous culture and sensitivity was negative for active bacterial " growth.  Patient states she really has not noticed a lot of drainage from the area in the 1st webspace left on evaluation today there was no active drainage noted the wound overlying the plantar medial aspect of the left 1st MPJ is also stable it actually looks better since it is not being packed and there was no active drainage it is 7 mm long by 1 mm wide it appears that some of the depth has decreased since the patient has gently been flushing both of these areas with Dakin solution.  Patient will continue flushing with Dakin solution at both sites every day she is going to apply silver alginate to the medial aspect of the 1st MPJ left if the area appears too moist however otherwise she will be using Xeroform to ensure the area does not become too dry and subsequently callused and splitting open.  At this point the patient's foot is stable she is going to continue to do her daily dressing changes I will plan to follow up with her in 4 weeks unless she has any problems questions or concerns certainly if she has a flare up any signs of infection redness swelling increased drainage she is to contact us immediately.  This note was created using [employer] voice recognition software that occasionally misinterpreted phrases or words.

## 2024-11-07 ENCOUNTER — OFFICE VISIT (OUTPATIENT)
Dept: PODIATRY | Facility: CLINIC | Age: 59
End: 2024-11-07
Payer: COMMERCIAL

## 2024-11-07 VITALS
HEIGHT: 61 IN | DIASTOLIC BLOOD PRESSURE: 77 MMHG | SYSTOLIC BLOOD PRESSURE: 134 MMHG | OXYGEN SATURATION: 97 % | WEIGHT: 119.94 LBS | HEART RATE: 100 BPM | BODY MASS INDEX: 22.64 KG/M2

## 2024-11-07 DIAGNOSIS — M20.12 HALLUX VALGUS OF LEFT FOOT: ICD-10-CM

## 2024-11-07 DIAGNOSIS — E11.49 TYPE II DIABETES MELLITUS WITH NEUROLOGICAL MANIFESTATIONS: ICD-10-CM

## 2024-11-07 DIAGNOSIS — L97.522 ULCER OF LEFT FOOT WITH FAT LAYER EXPOSED: Primary | ICD-10-CM

## 2024-11-07 DIAGNOSIS — E11.9 COMPREHENSIVE DIABETIC FOOT EXAMINATION, TYPE 2 DM, ENCOUNTER FOR: ICD-10-CM

## 2024-11-07 PROCEDURE — 99999 PR PBB SHADOW E&M-EST. PATIENT-LVL IV: CPT | Mod: PBBFAC,,, | Performed by: PODIATRIST

## 2024-11-07 PROCEDURE — 3078F DIAST BP <80 MM HG: CPT | Mod: CPTII,S$GLB,, | Performed by: PODIATRIST

## 2024-11-07 PROCEDURE — 99213 OFFICE O/P EST LOW 20 MIN: CPT | Mod: S$GLB,,, | Performed by: PODIATRIST

## 2024-11-07 PROCEDURE — 3075F SYST BP GE 130 - 139MM HG: CPT | Mod: CPTII,S$GLB,, | Performed by: PODIATRIST

## 2024-11-07 PROCEDURE — 1160F RVW MEDS BY RX/DR IN RCRD: CPT | Mod: CPTII,S$GLB,, | Performed by: PODIATRIST

## 2024-11-07 PROCEDURE — 3008F BODY MASS INDEX DOCD: CPT | Mod: CPTII,S$GLB,, | Performed by: PODIATRIST

## 2024-11-07 PROCEDURE — 1159F MED LIST DOCD IN RCRD: CPT | Mod: CPTII,S$GLB,, | Performed by: PODIATRIST

## 2024-11-10 NOTE — PROGRESS NOTES
Subjective:       Patient ID: No Munoz is a 59 y.o. female.    Chief Complaint: Foot Ulcer  Patient presents for follow-up ulceration left foot.  Patient is a diabetic and has a severe bunion deformity which contribute to the patient's area of ulceration left foot.  Past Medical History:   Diagnosis Date    Diabetes mellitus     Diabetes mellitus, type 2     Hyperlipidemia     Hypertension     Myocardial infarction      Past Surgical History:   Procedure Laterality Date    CORONARY ANGIOGRAPHY N/A 2022    Procedure: Left heart cath;  Surgeon: Rohith Guajardo MD;  Location: [facility] CATH/EP LAB;  Service: Cardiology;  Laterality: N/A;     Family History   Problem Relation Name Age of Onset    Heart disease Mother      Stroke Mother      Diabetes Mother      Heart disease Father      Diabetes Father       Social History     Socioeconomic History    Marital status: Single   Tobacco Use    Smoking status: Former     Current packs/day: 1.00     Types: Cigarettes     Passive exposure: Current    Smokeless tobacco: Never    Tobacco comments:     Quit 2022   Substance and Sexual Activity    Alcohol use: Never    Drug use: Never       Current Outpatient Medications   Medication Sig Dispense Refill    aspirin (ECOTRIN) 81 MG EC tablet Take 81 mg by mouth once daily.      atorvastatin (LIPITOR) 40 MG tablet Take by mouth.      clopidogreL (PLAVIX) 75 mg tablet Take 75 mg by mouth.      JARDIANCE 10 mg tablet Take 10 mg by mouth every morning.      metFORMIN (GLUCOPHAGE-XR) 500 MG ER 24hr tablet Take 1,000 mg by mouth 2 (two) times daily.      metoprolol tartrate (LOPRESSOR) 25 MG tablet Take 12.5 mg by mouth 2 (two) times daily.      OZEMPIC 1 mg/dose (4 mg/3 mL) Inject 1 mg into the skin every 7 days.      REPATHA SURECLICK 140 mg/mL PnIj SMARTSI Milligram(s) SUB-Q Every 2 Weeks       No current facility-administered medications for this visit.     Review of patient's allergies indicates:  "  Allergen Reactions    Antihistamines - alkylamine     Sulfa (sulfonamide antibiotics)      Facial swelling       Review of Systems   Musculoskeletal:  Positive for joint swelling.   Skin:  Positive for color change and wound.   Neurological:  Positive for numbness.   All other systems reviewed and are negative.      Objective:      Vitals:    11/07/24 1600   BP: 134/77   Pulse: 100   SpO2: 97%   Weight: 54.4 kg (119 lb 14.9 oz)   Height: 5' 1" (1.549 m)     Physical Exam  Vitals and nursing note reviewed.   Constitutional:       Appearance: Normal appearance.   Cardiovascular:      Pulses:           Dorsalis pedis pulses are 2+ on the right side and 2+ on the left side.        Posterior tibial pulses are 1+ on the right side and 1+ on the left side.   Pulmonary:      Effort: Pulmonary effort is normal.   Musculoskeletal:         General: Swelling and deformity present.      Left foot: Deformity and bunion present.        Feet:    Feet:      Right foot:      Protective Sensation: 4 sites tested.   1 site sensed.     Left foot:      Protective Sensation: 4 sites tested.   1 site sensed.     Skin integrity: Ulcer, skin breakdown and callus present.   Skin:     General: Skin is warm.      Findings: Lesion present.   Neurological:      Mental Status: She is alert.      Sensory: Sensory deficit present.   Psychiatric:         Mood and Affect: Mood normal.         Behavior: Behavior normal.                                                                      Assessment:       1. Ulcer of left foot with fat layer exposed    2. Hallux valgus of left foot    3. Type II diabetes mellitus with neurological manifestations    4. Comprehensive diabetic foot examination, type 2 DM, encounter for          Plan:       Patient presents today follow-up on ulceration left foot secondary to severe bunion deformity with dislocation of the 1st MPJ left.  Patient states she still getting drainage from the area closest to the 1st webspace " of the left foot she has flushing and irrigating with Dakin solution.  Patient has continued daily wound care as directed.  Patient states on several occasion she has had a poke with a pin the area in the 1st webspace to help open this up so that she can flush and irrigate the site.  On evaluation the ulceration overlying the medial aspect of the left 1st MPJ looks considerably better the open area is a lot smaller than it had been and at this point is about 4 mm long by less than 1 mm wide it does not appear as deep the patient required no non excisional debridement today of either area the area is staying well hydrated but it is not staying too moist patient is found a fine balance between using the silver alginate as needed verses the Xeroform or a dry dressing keeping the area protected.  Patient still needs to try to splint the big toe more medial since it is severely laterally deviated it is putting a lot of stress on the medial ulceration site.  Overall the patient's foot is looking a lot better I do have concerns about the continued drainage in the 1st webspace and this open area patient will continue to monitor this closely I am going to see her for follow-up in 6 weeks unless anything flares up or gets worse at which time she is to contact us immediately certainly any increased redness swelling or drainage she should contact us.  Comprehensive diabetic evaluation performed.  This note was created using [email or web address] voice recognition software that occasionally misinterpreted phrases or words.

## 2024-12-19 ENCOUNTER — OFFICE VISIT (OUTPATIENT)
Dept: PODIATRY | Facility: CLINIC | Age: 59
End: 2024-12-19
Payer: COMMERCIAL

## 2024-12-19 VITALS
HEIGHT: 61 IN | WEIGHT: 119.94 LBS | DIASTOLIC BLOOD PRESSURE: 81 MMHG | BODY MASS INDEX: 22.64 KG/M2 | HEART RATE: 100 BPM | SYSTOLIC BLOOD PRESSURE: 124 MMHG

## 2024-12-19 DIAGNOSIS — E11.9 COMPREHENSIVE DIABETIC FOOT EXAMINATION, TYPE 2 DM, ENCOUNTER FOR: ICD-10-CM

## 2024-12-19 DIAGNOSIS — L97.522 ULCER OF LEFT FOOT WITH FAT LAYER EXPOSED: Primary | ICD-10-CM

## 2024-12-19 DIAGNOSIS — E11.49 TYPE II DIABETES MELLITUS WITH NEUROLOGICAL MANIFESTATIONS: ICD-10-CM

## 2024-12-19 DIAGNOSIS — Z72.0 TOBACCO USE: ICD-10-CM

## 2024-12-19 PROCEDURE — 3079F DIAST BP 80-89 MM HG: CPT | Mod: CPTII,S$GLB,, | Performed by: PODIATRIST

## 2024-12-19 PROCEDURE — 1159F MED LIST DOCD IN RCRD: CPT | Mod: CPTII,S$GLB,, | Performed by: PODIATRIST

## 2024-12-19 PROCEDURE — 99213 OFFICE O/P EST LOW 20 MIN: CPT | Mod: S$GLB,,, | Performed by: PODIATRIST

## 2024-12-19 PROCEDURE — 99999 PR PBB SHADOW E&M-EST. PATIENT-LVL IV: CPT | Mod: PBBFAC,,, | Performed by: PODIATRIST

## 2024-12-19 PROCEDURE — 1160F RVW MEDS BY RX/DR IN RCRD: CPT | Mod: CPTII,S$GLB,, | Performed by: PODIATRIST

## 2024-12-19 PROCEDURE — 3008F BODY MASS INDEX DOCD: CPT | Mod: CPTII,S$GLB,, | Performed by: PODIATRIST

## 2024-12-19 PROCEDURE — 3074F SYST BP LT 130 MM HG: CPT | Mod: CPTII,S$GLB,, | Performed by: PODIATRIST

## 2024-12-22 NOTE — PROGRESS NOTES
Subjective:       Patient ID: No Munoz is a 59 y.o. female.    Chief Complaint: Foot Ulcer  Patient presents for follow-up ulceration left foot.  Patient is a diabetic and has a severe bunion deformity which contribute to the patient's area of ulceration left foot.  Past Medical History:   Diagnosis Date    Diabetes mellitus     Diabetes mellitus, type 2     Hyperlipidemia     Hypertension     Myocardial infarction      Past Surgical History:   Procedure Laterality Date    CORONARY ANGIOGRAPHY N/A 2022    Procedure: Left heart cath;  Surgeon: Rohith Guajardo MD;  Location: [facility] CATH/EP LAB;  Service: Cardiology;  Laterality: N/A;     Family History   Problem Relation Name Age of Onset    Heart disease Mother      Stroke Mother      Diabetes Mother      Heart disease Father      Diabetes Father       Social History     Socioeconomic History    Marital status: Single   Tobacco Use    Smoking status: Former     Current packs/day: 1.00     Types: Cigarettes     Passive exposure: Current    Smokeless tobacco: Never    Tobacco comments:     Quit 2022   Substance and Sexual Activity    Alcohol use: Never    Drug use: Never       Current Outpatient Medications   Medication Sig Dispense Refill    aspirin (ECOTRIN) 81 MG EC tablet Take 81 mg by mouth once daily.      atorvastatin (LIPITOR) 40 MG tablet Take by mouth.      clopidogreL (PLAVIX) 75 mg tablet Take 75 mg by mouth.      JARDIANCE 10 mg tablet Take 10 mg by mouth every morning.      metFORMIN (GLUCOPHAGE-XR) 500 MG ER 24hr tablet Take 1,000 mg by mouth 2 (two) times daily.      metoprolol tartrate (LOPRESSOR) 25 MG tablet Take 12.5 mg by mouth 2 (two) times daily.      OZEMPIC 1 mg/dose (4 mg/3 mL) Inject 1 mg into the skin every 7 days.      REPATHA SURECLICK 140 mg/mL PnIj SMARTSI Milligram(s) SUB-Q Every 2 Weeks       No current facility-administered medications for this visit.     Review of patient's allergies indicates:  "  Allergen Reactions    Antihistamines - alkylamine     Sulfa (sulfonamide antibiotics)      Facial swelling       Review of Systems   Musculoskeletal:  Positive for joint swelling.   Skin:  Positive for color change and wound.   Neurological:  Positive for numbness.   All other systems reviewed and are negative.      Objective:      Vitals:    12/19/24 1600   BP: 124/81   BP Location: Right arm   Patient Position: Sitting   Pulse: 100   Weight: 54.4 kg (119 lb 14.9 oz)   Height: 5' 1" (1.549 m)     Physical Exam  Vitals and nursing note reviewed.   Constitutional:       Appearance: Normal appearance.   Cardiovascular:      Pulses:           Dorsalis pedis pulses are 2+ on the right side and 2+ on the left side.        Posterior tibial pulses are 1+ on the right side and 1+ on the left side.   Pulmonary:      Effort: Pulmonary effort is normal.   Musculoskeletal:         General: Swelling and deformity present.      Left foot: Deformity and bunion present.        Feet:    Feet:      Right foot:      Protective Sensation: 4 sites tested.   1 site sensed.     Left foot:      Protective Sensation: 4 sites tested.   1 site sensed.     Skin integrity: Ulcer, skin breakdown and callus present.   Skin:     General: Skin is warm.      Findings: Lesion present.   Neurological:      Mental Status: She is alert.      Sensory: Sensory deficit present.   Psychiatric:         Mood and Affect: Mood normal.         Behavior: Behavior normal.                                                                                    Assessment:       1. Ulcer of left foot with fat layer exposed    2. Type II diabetes mellitus with neurological manifestations    3. Comprehensive diabetic foot examination, type 2 DM, encounter for    4. Tobacco use          Plan:       Patient presents today follow-up on ulceration left foot secondary to severe bunion deformity with dislocation of the 1st MPJ left.  Patient states she still getting drainage " from the area closest to the 1st webspace of the left foot she has flushing and irrigating with Dakin solution.  Patient has continued daily wound care as directed.  Patient states on several occasion she has had a poke with a pin the area in the 1st webspace to help open this up so that she can flush and irrigate the site.  On evaluation the ulceration overlying the medial aspect of the left 1st MPJ is currently stable and relatively unchanged.  Medial 1st MPJ left is staying well hydrated but it is not staying too moist patient is found a fine balance between using the silver alginate as needed verses the Xeroform or a dry dressing keeping the area protected.  Patient still needs to try to splint the big toe more medial since it is severely laterally deviated it is putting a lot of stress on the medial ulceration site.  Patient continues to use the toe spacer in the 1st webspace as directed.  Overall the areas not considerably changed the patient continues to use Promogran with silver she continues to use the Xeroform about every other day over the medial aspect of the joint she has flushing with Dakin solution utilizing the Jelco tips we did give her additional supplies a new toe spacers to use my concern is that over a long period of time when she continues to have this bloody purulent drainage from the 1st webspace that she could have a flare up of infection leading to osteomyelitis this is something that needs to be monitored closely and I have advised the patient any flare ups any redness swelling or increased drainage she is to contact us immediately she is going to continue to do the daily wound care I will plan to re-evaluate her in 2 months unless something comes up sooner that requires treatment.  Comprehensive diabetic evaluation performed.  This note was created using [employer] voice recognition software that occasionally misinterpreted phrases or words.

## 2025-02-20 ENCOUNTER — OFFICE VISIT (OUTPATIENT)
Dept: PODIATRY | Facility: CLINIC | Age: 60
End: 2025-02-20
Payer: COMMERCIAL

## 2025-02-20 VITALS
WEIGHT: 119.94 LBS | DIASTOLIC BLOOD PRESSURE: 70 MMHG | SYSTOLIC BLOOD PRESSURE: 114 MMHG | BODY MASS INDEX: 22.64 KG/M2 | HEIGHT: 61 IN | HEART RATE: 102 BPM

## 2025-02-20 DIAGNOSIS — E11.49 TYPE II DIABETES MELLITUS WITH NEUROLOGICAL MANIFESTATIONS: ICD-10-CM

## 2025-02-20 DIAGNOSIS — Z72.0 TOBACCO USE: ICD-10-CM

## 2025-02-20 DIAGNOSIS — E11.9 COMPREHENSIVE DIABETIC FOOT EXAMINATION, TYPE 2 DM, ENCOUNTER FOR: ICD-10-CM

## 2025-02-20 DIAGNOSIS — L97.522 ULCER OF LEFT FOOT WITH FAT LAYER EXPOSED: Primary | ICD-10-CM

## 2025-02-20 PROCEDURE — 3078F DIAST BP <80 MM HG: CPT | Mod: CPTII,S$GLB,, | Performed by: PODIATRIST

## 2025-02-20 PROCEDURE — 3008F BODY MASS INDEX DOCD: CPT | Mod: CPTII,S$GLB,, | Performed by: PODIATRIST

## 2025-02-20 PROCEDURE — 1159F MED LIST DOCD IN RCRD: CPT | Mod: CPTII,S$GLB,, | Performed by: PODIATRIST

## 2025-02-20 PROCEDURE — 99999 PR PBB SHADOW E&M-EST. PATIENT-LVL IV: CPT | Mod: PBBFAC,,, | Performed by: PODIATRIST

## 2025-02-20 PROCEDURE — 1160F RVW MEDS BY RX/DR IN RCRD: CPT | Mod: CPTII,S$GLB,, | Performed by: PODIATRIST

## 2025-02-20 PROCEDURE — 3074F SYST BP LT 130 MM HG: CPT | Mod: CPTII,S$GLB,, | Performed by: PODIATRIST

## 2025-02-20 PROCEDURE — 99214 OFFICE O/P EST MOD 30 MIN: CPT | Mod: S$GLB,,, | Performed by: PODIATRIST

## 2025-02-20 RX ORDER — LEVOFLOXACIN 750 MG/1
750 TABLET ORAL DAILY
Qty: 14 TABLET | Refills: 0 | Status: SHIPPED | OUTPATIENT
Start: 2025-02-20 | End: 2025-03-06

## 2025-02-23 NOTE — PROGRESS NOTES
Subjective:       Patient ID: No Munoz is a 59 y.o. female.    Chief Complaint: Foot Ulcer  Patient presents for follow-up ulceration left foot.  Patient is a diabetic and has a severe bunion deformity which contribute to the patient's area of ulceration left foot.  Past Medical History:   Diagnosis Date    Diabetes mellitus     Diabetes mellitus, type 2     Hyperlipidemia     Hypertension     Myocardial infarction      Past Surgical History:   Procedure Laterality Date    CORONARY ANGIOGRAPHY N/A 2022    Procedure: Left heart cath;  Surgeon: Rohith Guajardo MD;  Location: [facility] CATH/EP LAB;  Service: Cardiology;  Laterality: N/A;     Family History   Problem Relation Name Age of Onset    Heart disease Mother      Stroke Mother      Diabetes Mother      Heart disease Father      Diabetes Father       Social History     Socioeconomic History    Marital status: Single   Tobacco Use    Smoking status: Former     Current packs/day: 1.00     Types: Cigarettes     Passive exposure: Current    Smokeless tobacco: Never    Tobacco comments:     Quit 2022   Substance and Sexual Activity    Alcohol use: Never    Drug use: Never       Current Outpatient Medications   Medication Sig Dispense Refill    aspirin (ECOTRIN) 81 MG EC tablet Take 81 mg by mouth once daily.      atorvastatin (LIPITOR) 40 MG tablet Take by mouth.      clopidogreL (PLAVIX) 75 mg tablet Take 75 mg by mouth.      JARDIANCE 10 mg tablet Take 10 mg by mouth every morning.      metFORMIN (GLUCOPHAGE-XR) 500 MG ER 24hr tablet Take 1,000 mg by mouth 2 (two) times daily.      metoprolol tartrate (LOPRESSOR) 25 MG tablet Take 12.5 mg by mouth 2 (two) times daily.      OZEMPIC 1 mg/dose (4 mg/3 mL) Inject 1 mg into the skin every 7 days.      REPATHA SURECLICK 140 mg/mL PnIj SMARTSI Milligram(s) SUB-Q Every 2 Weeks      levoFLOXacin (LEVAQUIN) 750 MG tablet Take 1 tablet (750 mg total) by mouth once daily. for 14 days 14 tablet 0  "    No current facility-administered medications for this visit.     Review of patient's allergies indicates:   Allergen Reactions    Antihistamines - alkylamine     Sulfa (sulfonamide antibiotics)      Facial swelling       Review of Systems   Musculoskeletal:  Positive for joint swelling.   Skin:  Positive for color change and wound.   Neurological:  Positive for numbness.   All other systems reviewed and are negative.      Objective:      Vitals:    02/20/25 1550   BP: 114/70   Pulse: 102   Weight: 54.4 kg (119 lb 14.9 oz)   Height: 5' 1" (1.549 m)     Physical Exam  Vitals and nursing note reviewed.   Constitutional:       Appearance: Normal appearance.   Cardiovascular:      Pulses:           Dorsalis pedis pulses are 2+ on the right side and 2+ on the left side.        Posterior tibial pulses are 1+ on the right side and 1+ on the left side.   Pulmonary:      Effort: Pulmonary effort is normal.   Musculoskeletal:         General: Swelling and deformity present.      Left foot: Deformity and bunion present.        Feet:    Feet:      Right foot:      Protective Sensation: 4 sites tested.   1 site sensed.     Left foot:      Protective Sensation: 4 sites tested.   1 site sensed.     Skin integrity: Ulcer, skin breakdown and callus present.   Skin:     General: Skin is warm.      Findings: Lesion present.   Neurological:      Mental Status: She is alert.      Sensory: Sensory deficit present.   Psychiatric:         Mood and Affect: Mood normal.         Behavior: Behavior normal.                                                                                              Assessment:       1. Ulcer of left foot with fat layer exposed    2. Type II diabetes mellitus with neurological manifestations    3. Comprehensive diabetic foot examination, type 2 DM, encounter for    4. Tobacco use          Plan:       Patient presents today follow-up on ulceration left foot secondary to severe bunion deformity with dislocation " of the 1st MPJ left.  Patient states she still getting drainage from the area closest to the 1st webspace of the left foot she has flushing and irrigating with Dakin solution.  Patient has continued daily wound care as directed.  Patient states on several occasion she has had a poke with a pin the area in the 1st webspace to help open this up so that she can flush and irrigate the site.  On evaluation the ulceration overlying the medial aspect of the left 1st MPJ is currently stable and relatively unchanged.  Medial 1st MPJ left is staying well hydrated but it is not staying too moist patient is found a fine balance between using the silver alginate as needed verses the Xeroform or a dry dressing keeping the area protected.  Patient still needs to try to splint the big toe more medial since it is severely laterally deviated it is putting a lot of stress on the medial ulceration site.  Patient continues to use the toe spacer in the 1st webspace as directed.  The ulceration on the plantar aspect of the medial 1st MPJ has gotten deeper it does now track down to the level of bone previously this was 2 mm deep it is now about 6 mm deep I advised the patient to stop packing the Promogran in the wound site the packing of it maybe putting excessive pressure at the base of the wound causing it to go deeper and I am very concerned that she still having drainage from the 1st webspace area and now she has drainage from the area underlying the big toe joint.  I have started the patient on Levaquin times 14 days previous culture and sensitivities that I have performed on the patient have only displayed normal skin bacteria I have ordered an x-ray of the left foot I am concerned about the potential for osteomyelitis the patient's bunion deformity has gotten significantly worse.  Patient indicates her most recent A1c was 7.4.  Patient's x-ray has not been performed yet it was ordered at the time of the visit but has been scheduled  for later date I will review this once available patient will continue to apply silver alginate to both of these areas she was dispensed Jelco tips to flush these areas silver alginate to place over the areas I do want see her for a 2 week follow-up I am very concerned about the presentation of the wounds today in the fact that they have not shown significant signs of improvement.  Certainly the patient's job which requires her to be on her feet a lot is a contributing factor as his her diabetes and bunion deformity.   Comprehensive diabetic evaluation performed.  This note was created using [employer]. voice recognition software that occasionally misinterpreted phrases or words.

## 2025-02-24 ENCOUNTER — RESULTS FOLLOW-UP (OUTPATIENT)
Dept: PODIATRY | Facility: CLINIC | Age: 60
End: 2025-02-24
Payer: COMMERCIAL

## 2025-02-24 ENCOUNTER — HOSPITAL ENCOUNTER (OUTPATIENT)
Dept: RADIOLOGY | Facility: HOSPITAL | Age: 60
Discharge: HOME | End: 2025-02-24
Attending: PODIATRIST
Payer: COMMERCIAL

## 2025-02-24 DIAGNOSIS — L97.522 ULCER OF LEFT FOOT WITH FAT LAYER EXPOSED: ICD-10-CM

## 2025-02-24 PROCEDURE — 73630 X-RAY EXAM OF FOOT: CPT | Mod: TC,PN,LT

## 2025-02-24 PROCEDURE — 73630 X-RAY EXAM OF FOOT: CPT | Mod: 26,LT,, | Performed by: RADIOLOGY

## 2025-02-24 NOTE — TELEPHONE ENCOUNTER
----- Message from Everardo Sweeney DPM sent at 2/24/2025  3:53 PM CST -----  Please call and advised the patient I did review her x-rays there are no erosive changes nor findings consistent with bone infection.  The patient's bunion has definitely gotten worse as the big toe   is slowly dislocating from the big toe joint.  I will review the x-rays with the patient at her follow-up.  ----- Message -----  From: Interface, Rad Results In  Sent: 2/24/2025   2:23 PM CST  To: Everardo Sweeney DPM

## 2025-03-13 ENCOUNTER — OFFICE VISIT (OUTPATIENT)
Dept: PODIATRY | Facility: CLINIC | Age: 60
End: 2025-03-13
Payer: COMMERCIAL

## 2025-03-13 VITALS
BODY MASS INDEX: 22.64 KG/M2 | WEIGHT: 119.94 LBS | DIASTOLIC BLOOD PRESSURE: 64 MMHG | HEART RATE: 94 BPM | HEIGHT: 61 IN | SYSTOLIC BLOOD PRESSURE: 93 MMHG

## 2025-03-13 DIAGNOSIS — L97.522 ULCER OF LEFT FOOT WITH FAT LAYER EXPOSED: Primary | ICD-10-CM

## 2025-03-13 DIAGNOSIS — M20.12 HALLUX VALGUS OF LEFT FOOT: ICD-10-CM

## 2025-03-13 DIAGNOSIS — Z72.0 TOBACCO USE: ICD-10-CM

## 2025-03-13 DIAGNOSIS — E11.9 COMPREHENSIVE DIABETIC FOOT EXAMINATION, TYPE 2 DM, ENCOUNTER FOR: ICD-10-CM

## 2025-03-13 DIAGNOSIS — M71.072 ABSCESS OF BURSA OF LEFT FOOT: ICD-10-CM

## 2025-03-13 DIAGNOSIS — E11.49 TYPE II DIABETES MELLITUS WITH NEUROLOGICAL MANIFESTATIONS: ICD-10-CM

## 2025-03-13 PROCEDURE — 3074F SYST BP LT 130 MM HG: CPT | Mod: CPTII,S$GLB,, | Performed by: PODIATRIST

## 2025-03-13 PROCEDURE — 1159F MED LIST DOCD IN RCRD: CPT | Mod: CPTII,S$GLB,, | Performed by: PODIATRIST

## 2025-03-13 PROCEDURE — 3078F DIAST BP <80 MM HG: CPT | Mod: CPTII,S$GLB,, | Performed by: PODIATRIST

## 2025-03-13 PROCEDURE — 87070 CULTURE OTHR SPECIMN AEROBIC: CPT | Performed by: PODIATRIST

## 2025-03-13 PROCEDURE — 3008F BODY MASS INDEX DOCD: CPT | Mod: CPTII,S$GLB,, | Performed by: PODIATRIST

## 2025-03-13 PROCEDURE — 1160F RVW MEDS BY RX/DR IN RCRD: CPT | Mod: CPTII,S$GLB,, | Performed by: PODIATRIST

## 2025-03-13 PROCEDURE — 99999 PR PBB SHADOW E&M-EST. PATIENT-LVL IV: CPT | Mod: PBBFAC,,, | Performed by: PODIATRIST

## 2025-03-13 PROCEDURE — 99213 OFFICE O/P EST LOW 20 MIN: CPT | Mod: S$GLB,,, | Performed by: PODIATRIST

## 2025-03-13 RX ORDER — ERGOCALCIFEROL 1.25 MG/1
50000 CAPSULE ORAL
COMMUNITY
Start: 2025-02-24

## 2025-03-16 LAB — BACTERIA SPEC AEROBE CULT: NORMAL

## 2025-03-16 NOTE — PROGRESS NOTES
Subjective:       Patient ID: No Munoz is a 59 y.o. female.    Chief Complaint: Foot Ulcer  Patient presents for follow-up ulceration left foot.  Patient is a diabetic and has a severe bunion deformity which contribute to the patient's area of ulceration left foot.  Past Medical History:   Diagnosis Date    Diabetes mellitus     Diabetes mellitus, type 2     Hyperlipidemia     Hypertension     Myocardial infarction      Past Surgical History:   Procedure Laterality Date    CORONARY ANGIOGRAPHY N/A 2022    Procedure: Left heart cath;  Surgeon: Rohith Guajardo MD;  Location: [facility] CATH/EP LAB;  Service: Cardiology;  Laterality: N/A;     Family History   Problem Relation Name Age of Onset    Heart disease Mother      Stroke Mother      Diabetes Mother      Heart disease Father      Diabetes Father       Social History     Socioeconomic History    Marital status: Single   Tobacco Use    Smoking status: Former     Current packs/day: 1.00     Types: Cigarettes     Passive exposure: Current    Smokeless tobacco: Never    Tobacco comments:     Quit 2022   Substance and Sexual Activity    Alcohol use: Never    Drug use: Never       Current Outpatient Medications   Medication Sig Dispense Refill    aspirin (ECOTRIN) 81 MG EC tablet Take 81 mg by mouth once daily.      atorvastatin (LIPITOR) 40 MG tablet Take by mouth.      clopidogreL (PLAVIX) 75 mg tablet Take 75 mg by mouth.      ergocalciferol (ERGOCALCIFEROL) 50,000 unit Cap Take 50,000 Units by mouth every 7 days.      JARDIANCE 10 mg tablet Take 10 mg by mouth every morning.      metFORMIN (GLUCOPHAGE-XR) 500 MG ER 24hr tablet Take 1,000 mg by mouth 2 (two) times daily.      metoprolol tartrate (LOPRESSOR) 25 MG tablet Take 12.5 mg by mouth 2 (two) times daily.      OZEMPIC 1 mg/dose (4 mg/3 mL) Inject 1 mg into the skin every 7 days.      REPATHA SURECLICK 140 mg/mL PnIj SMARTSI Milligram(s) SUB-Q Every 2 Weeks       No current  "facility-administered medications for this visit.     Review of patient's allergies indicates:   Allergen Reactions    Antihistamines - alkylamine     Sulfa (sulfonamide antibiotics)      Facial swelling       Review of Systems   Musculoskeletal:  Positive for joint swelling.   Skin:  Positive for color change and wound.   Neurological:  Positive for numbness.   All other systems reviewed and are negative.      Objective:      Vitals:    03/13/25 0929   BP: 93/64   Pulse: 94   Weight: 54.4 kg (119 lb 14.9 oz)   Height: 5' 1" (1.549 m)     Physical Exam  Vitals and nursing note reviewed.   Constitutional:       Appearance: Normal appearance.   Cardiovascular:      Pulses:           Dorsalis pedis pulses are 2+ on the right side and 2+ on the left side.        Posterior tibial pulses are 1+ on the right side and 1+ on the left side.   Pulmonary:      Effort: Pulmonary effort is normal.   Musculoskeletal:         General: Swelling and deformity present.      Left foot: Deformity and bunion present.        Feet:    Feet:      Right foot:      Protective Sensation: 4 sites tested.   1 site sensed.     Left foot:      Protective Sensation: 4 sites tested.   1 site sensed.     Skin integrity: Ulcer, skin breakdown and callus present.   Skin:     General: Skin is warm.      Findings: Lesion present.   Neurological:      Mental Status: She is alert.      Sensory: Sensory deficit present.   Psychiatric:         Mood and Affect: Mood normal.         Behavior: Behavior normal.                                                                Assessment:       1. Ulcer of left foot with fat layer exposed    2. Abscess of bursa of left foot    3. Hallux valgus of left foot    4. Type II diabetes mellitus with neurological manifestations    5. Comprehensive diabetic foot examination, type 2 DM, encounter for    6. Tobacco use          Plan:     Patient presents today follow-up on ulceration left foot secondary to severe bunion " deformity with dislocation of the 1st MPJ left.  Patient states she still getting drainage from the area closest to the 1st webspace of the left foot she has flushing and irrigating with Dakin solution.  Patient has continued daily wound care as directed.  Patient states on several occasion she has had a poke with a pin the area in the 1st webspace to help open this up so that she can flush and irrigate the site.  On evaluation the ulceration overlying the medial aspect of the left 1st MPJ is currently stable and relatively unchanged.  Medial 1st MPJ left is staying well hydrated but it is not staying too moist patient is found a fine balance between using the silver alginate as needed verses the Xeroform or a dry dressing keeping the area protected.  Patient still needs to try to splint the big toe more medial since it is severely laterally deviated it is putting a lot of stress on the medial ulceration site.  Patient continues to use the toe spacer in the 1st webspace as directed.  The ulceration on the plantar aspect of the medial 1st MPJ has gotten deeper it does now track down to the level of bone previously this was 6 mm deep it is now about 5 mm deep. We had previously stopped packing the wound site itself it does appears to be slightly less deep than it had been previously.  When I was evaluating and debriding the 2 small areas that are almost pinholes overlying the 1st webspace of the left foot there was a significant amount of he went to under the areas which has hard to believe since the patient is pushing these areas with Dakin solution.  Previous culture and sensitivity displayed only normal skin bacteria I did not feel was necessary to culture this area again this is the same area we recently cultured which was negative for active bacterial growth other than normal skin bacteria.  Patient has finished taking her Levaquin as directed.  I did advised the patient I am going to have her continue  alternating silver and Xeroform overlying the medial eminence of the 1st ray however when she does the flushes of the wound sites I am going to have her use a tobramycin solution that I mixed up for her she knows this needs to be kept in the refrigerator when not in use so it does not lose any of its strength but were going to discontinue the Dakin solution right now clearly this is not significantly improving the patient's wound site.  Plan follow-up will be 3 weeks.  Patient advised to contact us with any problems questions or concerns prior to scheduled follow-up.   Comprehensive diabetic evaluation performed.  This note was created using [employer] voice recognition software that occasionally misinterpreted phrases or words.

## 2025-03-17 ENCOUNTER — TELEPHONE (OUTPATIENT)
Dept: PODIATRY | Facility: CLINIC | Age: 60
End: 2025-03-17
Payer: COMMERCIAL

## 2025-04-10 ENCOUNTER — OFFICE VISIT (OUTPATIENT)
Dept: PODIATRY | Facility: CLINIC | Age: 60
End: 2025-04-10
Payer: COMMERCIAL

## 2025-04-10 VITALS
BODY MASS INDEX: 22.64 KG/M2 | WEIGHT: 119.94 LBS | HEART RATE: 93 BPM | DIASTOLIC BLOOD PRESSURE: 59 MMHG | HEIGHT: 61 IN | SYSTOLIC BLOOD PRESSURE: 92 MMHG

## 2025-04-10 DIAGNOSIS — E11.9 COMPREHENSIVE DIABETIC FOOT EXAMINATION, TYPE 2 DM, ENCOUNTER FOR: ICD-10-CM

## 2025-04-10 DIAGNOSIS — E11.49 TYPE II DIABETES MELLITUS WITH NEUROLOGICAL MANIFESTATIONS: ICD-10-CM

## 2025-04-10 DIAGNOSIS — M20.12 HALLUX VALGUS OF LEFT FOOT: ICD-10-CM

## 2025-04-10 DIAGNOSIS — Z72.0 TOBACCO USE: ICD-10-CM

## 2025-04-10 DIAGNOSIS — L97.522 ULCER OF LEFT FOOT WITH FAT LAYER EXPOSED: Primary | ICD-10-CM

## 2025-04-10 PROCEDURE — 99999 PR PBB SHADOW E&M-EST. PATIENT-LVL IV: CPT | Mod: PBBFAC,,, | Performed by: PODIATRIST

## 2025-04-10 PROCEDURE — 1160F RVW MEDS BY RX/DR IN RCRD: CPT | Mod: CPTII,S$GLB,, | Performed by: PODIATRIST

## 2025-04-10 PROCEDURE — 99213 OFFICE O/P EST LOW 20 MIN: CPT | Mod: S$GLB,,, | Performed by: PODIATRIST

## 2025-04-10 PROCEDURE — 1159F MED LIST DOCD IN RCRD: CPT | Mod: CPTII,S$GLB,, | Performed by: PODIATRIST

## 2025-04-10 PROCEDURE — 3074F SYST BP LT 130 MM HG: CPT | Mod: CPTII,S$GLB,, | Performed by: PODIATRIST

## 2025-04-10 PROCEDURE — 3008F BODY MASS INDEX DOCD: CPT | Mod: CPTII,S$GLB,, | Performed by: PODIATRIST

## 2025-04-10 PROCEDURE — 3078F DIAST BP <80 MM HG: CPT | Mod: CPTII,S$GLB,, | Performed by: PODIATRIST

## 2025-04-10 RX ORDER — METFORMIN HYDROCHLORIDE EXTENDED-RELEASE TABLETS 500 MG/1
1000 TABLET, FILM COATED, EXTENDED RELEASE ORAL
COMMUNITY
Start: 2025-03-13 | End: 2025-04-13 | Stop reason: SDUPTHER

## 2025-04-13 NOTE — PROGRESS NOTES
Subjective:       Patient ID: No Munoz is a 59 y.o. female.    Chief Complaint: Foot Ulcer  Patient presents for follow-up ulceration left foot.  Patient is a diabetic and has a severe bunion deformity which contribute to the patient's area of ulceration left foot.  Past Medical History:   Diagnosis Date    Diabetes mellitus     Diabetes mellitus, type 2     Hyperlipidemia     Hypertension     Myocardial infarction      Past Surgical History:   Procedure Laterality Date    CORONARY ANGIOGRAPHY N/A 2022    Procedure: Left heart cath;  Surgeon: Rohith Guajardo MD;  Location: [facility] CATH/EP LAB;  Service: Cardiology;  Laterality: N/A;     Family History   Problem Relation Name Age of Onset    Heart disease Mother      Stroke Mother      Diabetes Mother      Heart disease Father      Diabetes Father       Social History     Socioeconomic History    Marital status: Single   Tobacco Use    Smoking status: Former     Current packs/day: 1.00     Types: Cigarettes     Passive exposure: Current    Smokeless tobacco: Never    Tobacco comments:     Quit 2022   Substance and Sexual Activity    Alcohol use: Never    Drug use: Never       Current Outpatient Medications   Medication Sig Dispense Refill    aspirin (ECOTRIN) 81 MG EC tablet Take 81 mg by mouth once daily.      atorvastatin (LIPITOR) 40 MG tablet Take by mouth.      clopidogreL (PLAVIX) 75 mg tablet Take 75 mg by mouth.      ergocalciferol (ERGOCALCIFEROL) 50,000 unit Cap Take 50,000 Units by mouth every 7 days.      JARDIANCE 10 mg tablet Take 10 mg by mouth every morning.      metFORMIN (GLUCOPHAGE-XR) 500 MG ER 24hr tablet Take 1,000 mg by mouth 2 (two) times daily.      metoprolol tartrate (LOPRESSOR) 25 MG tablet Take 12.5 mg by mouth 2 (two) times daily.      OZEMPIC 1 mg/dose (4 mg/3 mL) Inject 1 mg into the skin every 7 days.      REPATHA SURECLICK 140 mg/mL PnIj SMARTSI Milligram(s) SUB-Q Every 2 Weeks       No current  "facility-administered medications for this visit.     Review of patient's allergies indicates:   Allergen Reactions    Antihistamines - alkylamine     Sulfa (sulfonamide antibiotics) Rash     Facial swelling       Review of Systems   Musculoskeletal:  Positive for joint swelling.   Skin:  Positive for color change and wound.   Neurological:  Positive for numbness.   All other systems reviewed and are negative.      Objective:      Vitals:    04/10/25 1548   BP: (!) 92/59   Pulse: 93   Weight: 54.4 kg (119 lb 14.9 oz)   Height: 5' 1" (1.549 m)     Physical Exam  Vitals and nursing note reviewed.   Constitutional:       Appearance: Normal appearance.   Cardiovascular:      Pulses:           Dorsalis pedis pulses are 2+ on the right side and 2+ on the left side.        Posterior tibial pulses are 1+ on the right side and 1+ on the left side.   Pulmonary:      Effort: Pulmonary effort is normal.   Musculoskeletal:         General: Swelling and deformity present.      Left foot: Deformity and bunion present.        Feet:    Feet:      Right foot:      Protective Sensation: 4 sites tested.   1 site sensed.     Left foot:      Protective Sensation: 4 sites tested.   1 site sensed.     Skin integrity: Ulcer, skin breakdown and callus present.   Skin:     General: Skin is warm.      Findings: Lesion present.   Neurological:      Mental Status: She is alert.      Sensory: Sensory deficit present.   Psychiatric:         Mood and Affect: Mood normal.         Behavior: Behavior normal.                                                                        Assessment:       1. Ulcer of left foot with fat layer exposed    2. Hallux valgus of left foot    3. Type II diabetes mellitus with neurological manifestations    4. Comprehensive diabetic foot examination, type 2 DM, encounter for    5. Tobacco use          Plan:     Patient presents today follow-up on ulceration left foot secondary to severe bunion deformity with dislocation " of the 1st MPJ left.  States she is still flushing and irrigating both the plantar ulcer and the ulcer on the dorsal aspect of the 1st webspace left she thinks it is doing a lot better she states it has not been as inflamed there has been little to no drainage and she believes that the tobramycin solution that we started using on her last visit has been working very well.  Patient relates she has really not had to use any of the silver alginate she is primarily just using the Xeroform as needed overlying the area of plantar ulceration an area of previous breakdown.  On evaluation today patient definitely has a reduction in the amount of inflammation and erythema overlying the 1st MPJ and left hallux it is not near as inflamed as it was previously although the patient still has a severe progressive bunion deformity with dislocation of the 1st MPJ left.  The ulceration plantar medial 1st MPJ has gotten smaller it is about 5 mm long by 3 mm wide by 3 mm deep the most notable size decrease is in the width and the depth of this wound it is definitely filling from the bottom up and shows considerable signs of improvement.  Patient was advised clearly she is responding very well to the tobramycin solution which I dispensed to her on the last visit.  Patient did have some limited debridement around the ulcer site the areas where she has irrigating on the dorsal aspect of the 1st webspace were not open these did not require debridement today.  Patient will continue flushing and irrigating with a tobramycin solution she indicates that she has plenty.  I do plan to follow up with the patient over the next 6-8 weeks unless she has any problems questions or concerns prior to that time she does show the most improvement in this between now and her last office visit as she has had in the past 6 months.  Diabetic evaluation performed.  Patient was dispensed Xeroform.  Patient states she has enough of all of the supplies.  This note  was created using [employer] voice recognition software that occasionally misinterpreted phrases or words.

## 2025-05-22 ENCOUNTER — OFFICE VISIT (OUTPATIENT)
Dept: PODIATRY | Facility: CLINIC | Age: 60
End: 2025-05-22
Payer: COMMERCIAL

## 2025-05-22 VITALS
WEIGHT: 119.94 LBS | HEART RATE: 96 BPM | HEIGHT: 61 IN | DIASTOLIC BLOOD PRESSURE: 76 MMHG | SYSTOLIC BLOOD PRESSURE: 123 MMHG | BODY MASS INDEX: 22.64 KG/M2

## 2025-05-22 DIAGNOSIS — L97.522 ULCER OF LEFT FOOT WITH FAT LAYER EXPOSED: Primary | ICD-10-CM

## 2025-05-22 DIAGNOSIS — E11.49 TYPE II DIABETES MELLITUS WITH NEUROLOGICAL MANIFESTATIONS: ICD-10-CM

## 2025-05-22 DIAGNOSIS — E11.9 COMPREHENSIVE DIABETIC FOOT EXAMINATION, TYPE 2 DM, ENCOUNTER FOR: ICD-10-CM

## 2025-05-22 DIAGNOSIS — Z72.0 TOBACCO USE: ICD-10-CM

## 2025-05-22 PROCEDURE — 3074F SYST BP LT 130 MM HG: CPT | Mod: CPTII,S$GLB,, | Performed by: PODIATRIST

## 2025-05-22 PROCEDURE — 3078F DIAST BP <80 MM HG: CPT | Mod: CPTII,S$GLB,, | Performed by: PODIATRIST

## 2025-05-22 PROCEDURE — 1160F RVW MEDS BY RX/DR IN RCRD: CPT | Mod: CPTII,S$GLB,, | Performed by: PODIATRIST

## 2025-05-22 PROCEDURE — 3008F BODY MASS INDEX DOCD: CPT | Mod: CPTII,S$GLB,, | Performed by: PODIATRIST

## 2025-05-22 PROCEDURE — 99999 PR PBB SHADOW E&M-EST. PATIENT-LVL IV: CPT | Mod: PBBFAC,,, | Performed by: PODIATRIST

## 2025-05-22 PROCEDURE — 1159F MED LIST DOCD IN RCRD: CPT | Mod: CPTII,S$GLB,, | Performed by: PODIATRIST

## 2025-05-22 PROCEDURE — 99213 OFFICE O/P EST LOW 20 MIN: CPT | Mod: S$GLB,,, | Performed by: PODIATRIST

## 2025-05-23 NOTE — PROGRESS NOTES
Subjective:       Patient ID: No Munoz is a 59 y.o. female.    Chief Complaint: Foot Ulcer  Patient presents for follow-up ulceration left foot.  Patient is a diabetic and has a severe bunion deformity which contribute to the patient's area of ulceration left foot.  Past Medical History:   Diagnosis Date    Diabetes mellitus     Diabetes mellitus, type 2     Hyperlipidemia     Hypertension     Myocardial infarction      Past Surgical History:   Procedure Laterality Date    CORONARY ANGIOGRAPHY N/A 2022    Procedure: Left heart cath;  Surgeon: Rohith Guajardo MD;  Location: [facility] CATH/EP LAB;  Service: Cardiology;  Laterality: N/A;     Family History   Problem Relation Name Age of Onset    Heart disease Mother      Stroke Mother      Diabetes Mother      Heart disease Father      Diabetes Father       Social History     Socioeconomic History    Marital status: Single   Tobacco Use    Smoking status: Former     Current packs/day: 1.00     Types: Cigarettes     Passive exposure: Current    Smokeless tobacco: Never    Tobacco comments:     Quit 2022   Substance and Sexual Activity    Alcohol use: Never    Drug use: Never       Current Outpatient Medications   Medication Sig Dispense Refill    aspirin (ECOTRIN) 81 MG EC tablet Take 81 mg by mouth once daily.      atorvastatin (LIPITOR) 40 MG tablet Take by mouth.      clopidogreL (PLAVIX) 75 mg tablet Take 75 mg by mouth.      JARDIANCE 10 mg tablet Take 10 mg by mouth every morning.      metFORMIN (GLUCOPHAGE-XR) 500 MG ER 24hr tablet Take 1,000 mg by mouth 2 (two) times daily.      metoprolol tartrate (LOPRESSOR) 25 MG tablet Take 12.5 mg by mouth 2 (two) times daily.      OZEMPIC 1 mg/dose (4 mg/3 mL) Inject 1 mg into the skin every 7 days.      REPATHA SURECLICK 140 mg/mL PnIj SMARTSI Milligram(s) SUB-Q Every 2 Weeks       No current facility-administered medications for this visit.     Review of patient's allergies indicates:  "  Allergen Reactions    Antihistamines - alkylamine     Sulfa (sulfonamide antibiotics) Rash     Facial swelling       Review of Systems   Musculoskeletal:  Positive for joint swelling.   Skin:  Positive for color change and wound.   Neurological:  Positive for numbness.   All other systems reviewed and are negative.      Objective:      Vitals:    05/22/25 1637   BP: 123/76   Pulse: 96   Weight: 54.4 kg (119 lb 14.9 oz)   Height: 5' 1" (1.549 m)     Physical Exam  Vitals and nursing note reviewed.   Constitutional:       Appearance: Normal appearance.   Cardiovascular:      Pulses:           Dorsalis pedis pulses are 2+ on the right side and 2+ on the left side.        Posterior tibial pulses are 1+ on the right side and 1+ on the left side.   Pulmonary:      Effort: Pulmonary effort is normal.   Musculoskeletal:         General: Swelling and deformity present.      Left foot: Deformity and bunion present.        Feet:    Feet:      Right foot:      Protective Sensation: 4 sites tested.   1 site sensed.     Left foot:      Protective Sensation: 4 sites tested.   1 site sensed.     Skin integrity: Ulcer, skin breakdown and callus present.   Skin:     General: Skin is warm.      Findings: Lesion present.   Neurological:      Mental Status: She is alert.      Sensory: Sensory deficit present.   Psychiatric:         Mood and Affect: Mood normal.         Behavior: Behavior normal.                                                                                Assessment:       1. Ulcer of left foot with fat layer exposed    2. Type II diabetes mellitus with neurological manifestations    3. Comprehensive diabetic foot examination, type 2 DM, encounter for    4. Tobacco use            Plan:     Patient presents today follow-up on ulceration left foot secondary to severe bunion deformity with dislocation of the 1st MPJ left.  Patient states that her left foot is doing considerably better it really has not been draining she " states the area where she had to flush too small abscess sites in the 1st webspace completely sealed and resolve there has been no drainage she has not had to flushed irrigate or specifically dress these areas.  Patient does continue to alternate depending upon the moisture of the skin silver alginate and Xeroform overlying the medial eminence of the 1st MPJ left.  Area of previous ulceration overlying the medial aspect of the 1st MPJ is stable it shows considerable signs of improvement the patient has less overall inflammation of the 1st MPJ while she still has a severe bunion deformity with the associated inflammation it looks considerably better there still a very small linear opening on the plantar aspect of the medial eminence however this is minimal there is no significant drainage I did advised the patient certainly she needs to continue to clean and irrigate this area as necessary I did recommend the use of Steri-Strips to help pull this area together to facilitate closure of the area.  The open area itself is not as deep as it was it is about 3 mm long by 1 mm wide with 1 mm in depth in his not infected.  Patient was advised clearly she is responding very well to the tobramycin solution which I dispensed to her on the last visit.  Patient did have some limited debridement around the ulcer site the areas where she has irrigating on the dorsal aspect of the 1st webspace were not open these did not require debridement today.  Patient will continue flushing and irrigating with a tobramycin solution she indicates that she has plenty.  Mild non excisional debridement of nonviable tissue around the open small slit was performed I do plan to follow up with the patient as needed the areas stable it is improving she is monitoring it closely certainly any problems questions concerns or should her condition worsens she is to contact us immediately.  Diabetic evaluation performed.  This note was created using [employer]  voice recognition software that occasionally misinterpreted phrases or words.

## 2025-08-14 ENCOUNTER — OFFICE VISIT (OUTPATIENT)
Dept: PODIATRY | Facility: CLINIC | Age: 60
End: 2025-08-14
Payer: COMMERCIAL

## 2025-08-14 VITALS
DIASTOLIC BLOOD PRESSURE: 66 MMHG | HEIGHT: 61 IN | HEART RATE: 107 BPM | WEIGHT: 119.94 LBS | SYSTOLIC BLOOD PRESSURE: 104 MMHG | BODY MASS INDEX: 22.64 KG/M2

## 2025-08-14 DIAGNOSIS — Z72.0 TOBACCO USE: ICD-10-CM

## 2025-08-14 DIAGNOSIS — E11.49 TYPE II DIABETES MELLITUS WITH NEUROLOGICAL MANIFESTATIONS: ICD-10-CM

## 2025-08-14 DIAGNOSIS — L97.522 ULCER OF LEFT FOOT WITH FAT LAYER EXPOSED: Primary | ICD-10-CM

## 2025-08-14 DIAGNOSIS — M20.12 HALLUX VALGUS OF LEFT FOOT: ICD-10-CM

## 2025-08-14 DIAGNOSIS — E11.9 COMPREHENSIVE DIABETIC FOOT EXAMINATION, TYPE 2 DM, ENCOUNTER FOR: ICD-10-CM

## 2025-08-14 PROCEDURE — 3008F BODY MASS INDEX DOCD: CPT | Mod: CPTII,S$GLB,, | Performed by: PODIATRIST

## 2025-08-14 PROCEDURE — 3078F DIAST BP <80 MM HG: CPT | Mod: CPTII,S$GLB,, | Performed by: PODIATRIST

## 2025-08-14 PROCEDURE — 99999 PR PBB SHADOW E&M-EST. PATIENT-LVL IV: CPT | Mod: PBBFAC,,, | Performed by: PODIATRIST

## 2025-08-14 PROCEDURE — 1159F MED LIST DOCD IN RCRD: CPT | Mod: CPTII,S$GLB,, | Performed by: PODIATRIST

## 2025-08-14 PROCEDURE — 3074F SYST BP LT 130 MM HG: CPT | Mod: CPTII,S$GLB,, | Performed by: PODIATRIST

## 2025-08-14 PROCEDURE — 99213 OFFICE O/P EST LOW 20 MIN: CPT | Mod: S$GLB,,, | Performed by: PODIATRIST

## 2025-08-14 PROCEDURE — 1160F RVW MEDS BY RX/DR IN RCRD: CPT | Mod: CPTII,S$GLB,, | Performed by: PODIATRIST

## (undated) DEVICE — SHEATH INTRODUCER SLENDER 6FX10CM

## (undated) DEVICE — CATHETER BALLOON EUPHORA 2.00X12MM

## (undated) DEVICE — GUIDEWIRE J TIP EXCHANGE FIXED CORE 260

## (undated) DEVICE — CATHETER GUIDE LAUNCHER EBU30 6X110

## (undated) DEVICE — CATHETER RADIAL TIG 4.0 OPTITORQUE 5X110

## (undated) DEVICE — CATHETER DIAGNOSTIC DXTERITY 6FR JL 4

## (undated) DEVICE — SHEATH PINNACLE 6FRX10CM W/GUIDEWIRE

## (undated) DEVICE — GUIDEWIRE DOUBLE ENDED .035 DIA. 150CML

## (undated) DEVICE — CATHETER DIAGNOSTIC DXTERITY 6FR JR 4.0

## (undated) DEVICE — GUIDEWIRE RUNTHROUGH 180 HYPERCOAT